# Patient Record
Sex: FEMALE | Race: WHITE | Employment: OTHER | URBAN - METROPOLITAN AREA
[De-identification: names, ages, dates, MRNs, and addresses within clinical notes are randomized per-mention and may not be internally consistent; named-entity substitution may affect disease eponyms.]

---

## 2021-10-03 ENCOUNTER — APPOINTMENT (OUTPATIENT)
Dept: CT IMAGING | Age: 81
DRG: 064 | End: 2021-10-03
Attending: FAMILY MEDICINE
Payer: MEDICARE

## 2021-10-03 ENCOUNTER — APPOINTMENT (OUTPATIENT)
Dept: MRI IMAGING | Age: 81
DRG: 064 | End: 2021-10-03
Attending: NEUROLOGICAL SURGERY
Payer: MEDICARE

## 2021-10-03 ENCOUNTER — HOSPITAL ENCOUNTER (INPATIENT)
Age: 81
LOS: 19 days | Discharge: NURSING, REHAB OR LONG-TERM CARE | DRG: 064 | End: 2021-10-22
Attending: EMERGENCY MEDICINE | Admitting: HOSPITALIST
Payer: MEDICARE

## 2021-10-03 ENCOUNTER — APPOINTMENT (OUTPATIENT)
Dept: CT IMAGING | Age: 81
DRG: 064 | End: 2021-10-03
Attending: EMERGENCY MEDICINE
Payer: MEDICARE

## 2021-10-03 DIAGNOSIS — R41.0 CONFUSION: ICD-10-CM

## 2021-10-03 DIAGNOSIS — S06.310A INTRAPARENCHYMAL HEMATOMA OF BRAIN, RIGHT, WITHOUT LOSS OF CONSCIOUSNESS, INITIAL ENCOUNTER (HCC): Primary | ICD-10-CM

## 2021-10-03 DIAGNOSIS — I62.9 INTRACRANIAL BLEED (HCC): ICD-10-CM

## 2021-10-03 DIAGNOSIS — R51.9 ACUTE NONINTRACTABLE HEADACHE, UNSPECIFIED HEADACHE TYPE: ICD-10-CM

## 2021-10-03 PROBLEM — I10 POORLY-CONTROLLED HYPERTENSION: Status: ACTIVE | Noted: 2021-10-03

## 2021-10-03 PROBLEM — G93.40 ENCEPHALOPATHY, UNSPECIFIED: Status: ACTIVE | Noted: 2021-10-03

## 2021-10-03 PROBLEM — F03.90 DEMENTIA (HCC): Status: ACTIVE | Noted: 2021-10-03

## 2021-10-03 LAB
ALBUMIN SERPL-MCNC: 3.6 G/DL (ref 3.2–4.6)
ALBUMIN/GLOB SERPL: 1 {RATIO} (ref 1.2–3.5)
ALP SERPL-CCNC: 79 U/L (ref 50–136)
ALT SERPL-CCNC: 16 U/L (ref 12–65)
ANION GAP SERPL CALC-SCNC: 4 MMOL/L (ref 7–16)
APTT PPP: 28.9 SEC (ref 24.1–35.1)
AST SERPL-CCNC: 11 U/L (ref 15–37)
BASOPHILS # BLD: 0 K/UL (ref 0–0.2)
BASOPHILS NFR BLD: 1 % (ref 0–2)
BILIRUB SERPL-MCNC: 0.4 MG/DL (ref 0.2–1.1)
BUN SERPL-MCNC: 27 MG/DL (ref 8–23)
CALCIUM SERPL-MCNC: 9 MG/DL (ref 8.3–10.4)
CHLORIDE SERPL-SCNC: 109 MMOL/L (ref 98–107)
CO2 SERPL-SCNC: 28 MMOL/L (ref 21–32)
CREAT SERPL-MCNC: 1.81 MG/DL (ref 0.6–1)
CRP SERPL-MCNC: <0.3 MG/DL (ref 0–0.9)
DIFFERENTIAL METHOD BLD: ABNORMAL
EOSINOPHIL # BLD: 0.1 K/UL (ref 0–0.8)
EOSINOPHIL NFR BLD: 2 % (ref 0.5–7.8)
ERYTHROCYTE [DISTWIDTH] IN BLOOD BY AUTOMATED COUNT: 12 % (ref 11.9–14.6)
ERYTHROCYTE [SEDIMENTATION RATE] IN BLOOD: 15 MM/HR (ref 0–30)
GLOBULIN SER CALC-MCNC: 3.6 G/DL (ref 2.3–3.5)
GLUCOSE BLD STRIP.AUTO-MCNC: 110 MG/DL (ref 65–100)
GLUCOSE SERPL-MCNC: 101 MG/DL (ref 65–100)
HCT VFR BLD AUTO: 36.8 % (ref 35.8–46.3)
HGB BLD-MCNC: 12.2 G/DL (ref 11.7–15.4)
IMM GRANULOCYTES # BLD AUTO: 0 K/UL (ref 0–0.5)
IMM GRANULOCYTES NFR BLD AUTO: 0 % (ref 0–5)
INR PPP: 1
LYMPHOCYTES # BLD: 1.1 K/UL (ref 0.5–4.6)
LYMPHOCYTES NFR BLD: 19 % (ref 13–44)
MCH RBC QN AUTO: 31.9 PG (ref 26.1–32.9)
MCHC RBC AUTO-ENTMCNC: 33.2 G/DL (ref 31.4–35)
MCV RBC AUTO: 96.1 FL (ref 79.6–97.8)
MONOCYTES # BLD: 0.7 K/UL (ref 0.1–1.3)
MONOCYTES NFR BLD: 12 % (ref 4–12)
NEUTS SEG # BLD: 4 K/UL (ref 1.7–8.2)
NEUTS SEG NFR BLD: 67 % (ref 43–78)
NRBC # BLD: 0 K/UL (ref 0–0.2)
PLATELET # BLD AUTO: 231 K/UL (ref 150–450)
PMV BLD AUTO: 11.1 FL (ref 9.4–12.3)
POTASSIUM SERPL-SCNC: 4.1 MMOL/L (ref 3.5–5.1)
PROT SERPL-MCNC: 7.2 G/DL (ref 6.3–8.2)
PROTHROMBIN TIME: 13.3 SEC (ref 12.6–14.5)
RBC # BLD AUTO: 3.83 M/UL (ref 4.05–5.2)
SERVICE CMNT-IMP: ABNORMAL
SODIUM SERPL-SCNC: 141 MMOL/L (ref 136–145)
WBC # BLD AUTO: 6.1 K/UL (ref 4.3–11.1)

## 2021-10-03 PROCEDURE — 70498 CT ANGIOGRAPHY NECK: CPT

## 2021-10-03 PROCEDURE — 96372 THER/PROPH/DIAG INJ SC/IM: CPT

## 2021-10-03 PROCEDURE — 51702 INSERT TEMP BLADDER CATH: CPT

## 2021-10-03 PROCEDURE — 4A03X5D MEASUREMENT OF ARTERIAL FLOW, INTRACRANIAL, EXTERNAL APPROACH: ICD-10-PCS | Performed by: RADIOLOGY

## 2021-10-03 PROCEDURE — 96375 TX/PRO/DX INJ NEW DRUG ADDON: CPT

## 2021-10-03 PROCEDURE — 85610 PROTHROMBIN TIME: CPT

## 2021-10-03 PROCEDURE — 65610000006 HC RM INTENSIVE CARE

## 2021-10-03 PROCEDURE — 85025 COMPLETE CBC W/AUTO DIFF WBC: CPT

## 2021-10-03 PROCEDURE — 86140 C-REACTIVE PROTEIN: CPT

## 2021-10-03 PROCEDURE — 74011000636 HC RX REV CODE- 636: Performed by: EMERGENCY MEDICINE

## 2021-10-03 PROCEDURE — 74011000250 HC RX REV CODE- 250: Performed by: FAMILY MEDICINE

## 2021-10-03 PROCEDURE — 80053 COMPREHEN METABOLIC PANEL: CPT

## 2021-10-03 PROCEDURE — 74011250636 HC RX REV CODE- 250/636: Performed by: FAMILY MEDICINE

## 2021-10-03 PROCEDURE — 74011250636 HC RX REV CODE- 250/636: Performed by: HOSPITALIST

## 2021-10-03 PROCEDURE — 82962 GLUCOSE BLOOD TEST: CPT

## 2021-10-03 PROCEDURE — A9576 INJ PROHANCE MULTIPACK: HCPCS | Performed by: HOSPITALIST

## 2021-10-03 PROCEDURE — 99284 EMERGENCY DEPT VISIT MOD MDM: CPT

## 2021-10-03 PROCEDURE — 70450 CT HEAD/BRAIN W/O DYE: CPT

## 2021-10-03 PROCEDURE — 74011250636 HC RX REV CODE- 250/636: Performed by: INTERNAL MEDICINE

## 2021-10-03 PROCEDURE — 74011000258 HC RX REV CODE- 258: Performed by: EMERGENCY MEDICINE

## 2021-10-03 PROCEDURE — 85652 RBC SED RATE AUTOMATED: CPT

## 2021-10-03 PROCEDURE — 0042T CT PERF W CBF: CPT

## 2021-10-03 PROCEDURE — 74011000250 HC RX REV CODE- 250: Performed by: EMERGENCY MEDICINE

## 2021-10-03 PROCEDURE — 74011000258 HC RX REV CODE- 258: Performed by: HOSPITALIST

## 2021-10-03 PROCEDURE — 70553 MRI BRAIN STEM W/O & W/DYE: CPT

## 2021-10-03 PROCEDURE — 96374 THER/PROPH/DIAG INJ IV PUSH: CPT

## 2021-10-03 PROCEDURE — 85730 THROMBOPLASTIN TIME PARTIAL: CPT

## 2021-10-03 PROCEDURE — 74011250636 HC RX REV CODE- 250/636: Performed by: EMERGENCY MEDICINE

## 2021-10-03 RX ORDER — MANNITOL 20 G/100ML
25 INJECTION, SOLUTION INTRAVENOUS ONCE
Status: COMPLETED | OUTPATIENT
Start: 2021-10-03 | End: 2021-10-03

## 2021-10-03 RX ORDER — PROMETHAZINE HYDROCHLORIDE 25 MG/ML
25 INJECTION, SOLUTION INTRAMUSCULAR; INTRAVENOUS
Status: COMPLETED | OUTPATIENT
Start: 2021-10-03 | End: 2021-10-03

## 2021-10-03 RX ORDER — METOCLOPRAMIDE HYDROCHLORIDE 5 MG/ML
5 INJECTION INTRAMUSCULAR; INTRAVENOUS
Status: DISCONTINUED | OUTPATIENT
Start: 2021-10-03 | End: 2021-10-07

## 2021-10-03 RX ORDER — LORAZEPAM 2 MG/ML
INJECTION INTRAMUSCULAR
Status: ACTIVE
Start: 2021-10-03 | End: 2021-10-03

## 2021-10-03 RX ORDER — PROCHLORPERAZINE EDISYLATE 5 MG/ML
10 INJECTION INTRAMUSCULAR; INTRAVENOUS
Status: DISCONTINUED | OUTPATIENT
Start: 2021-10-03 | End: 2021-10-03

## 2021-10-03 RX ORDER — HYDRALAZINE HYDROCHLORIDE 20 MG/ML
15 INJECTION INTRAMUSCULAR; INTRAVENOUS
Status: DISCONTINUED | OUTPATIENT
Start: 2021-10-03 | End: 2021-10-09

## 2021-10-03 RX ORDER — LORAZEPAM 2 MG/ML
1 INJECTION INTRAMUSCULAR
Status: COMPLETED | OUTPATIENT
Start: 2021-10-03 | End: 2021-10-03

## 2021-10-03 RX ORDER — LABETALOL HYDROCHLORIDE 5 MG/ML
10 INJECTION, SOLUTION INTRAVENOUS
Status: COMPLETED | OUTPATIENT
Start: 2021-10-03 | End: 2021-10-03

## 2021-10-03 RX ORDER — SODIUM CHLORIDE 0.9 % (FLUSH) 0.9 %
5-40 SYRINGE (ML) INJECTION AS NEEDED
Status: DISCONTINUED | OUTPATIENT
Start: 2021-10-03 | End: 2021-10-22 | Stop reason: HOSPADM

## 2021-10-03 RX ORDER — MANNITOL 250 MG/ML
25 INJECTION, SOLUTION INTRAVENOUS ONCE
Status: DISCONTINUED | OUTPATIENT
Start: 2021-10-03 | End: 2021-10-03 | Stop reason: SDUPTHER

## 2021-10-03 RX ORDER — SODIUM CHLORIDE 0.9 % (FLUSH) 0.9 %
10 SYRINGE (ML) INJECTION
Status: COMPLETED | OUTPATIENT
Start: 2021-10-03 | End: 2021-10-03

## 2021-10-03 RX ORDER — ONDANSETRON 2 MG/ML
8 INJECTION INTRAMUSCULAR; INTRAVENOUS
Status: DISCONTINUED | OUTPATIENT
Start: 2021-10-03 | End: 2021-10-17

## 2021-10-03 RX ORDER — ONDANSETRON 2 MG/ML
4 INJECTION INTRAMUSCULAR; INTRAVENOUS
Status: COMPLETED | OUTPATIENT
Start: 2021-10-03 | End: 2021-10-03

## 2021-10-03 RX ORDER — SODIUM CHLORIDE 0.9 % (FLUSH) 0.9 %
5-40 SYRINGE (ML) INJECTION EVERY 8 HOURS
Status: DISCONTINUED | OUTPATIENT
Start: 2021-10-03 | End: 2021-10-10

## 2021-10-03 RX ORDER — LEVETIRACETAM 500 MG/1
500 TABLET ORAL
Status: DISCONTINUED | OUTPATIENT
Start: 2021-10-03 | End: 2021-10-03

## 2021-10-03 RX ORDER — MORPHINE SULFATE 2 MG/ML
2 INJECTION, SOLUTION INTRAMUSCULAR; INTRAVENOUS
Status: COMPLETED | OUTPATIENT
Start: 2021-10-03 | End: 2021-10-03

## 2021-10-03 RX ORDER — LEVETIRACETAM 500 MG/1
500 TABLET ORAL 2 TIMES DAILY
Status: DISCONTINUED | OUTPATIENT
Start: 2021-10-03 | End: 2021-10-03 | Stop reason: SDUPTHER

## 2021-10-03 RX ORDER — AMLODIPINE BESYLATE 5 MG/1
5 TABLET ORAL DAILY
Status: DISCONTINUED | OUTPATIENT
Start: 2021-10-03 | End: 2021-10-07

## 2021-10-03 RX ORDER — SODIUM CHLORIDE 9 MG/ML
50 INJECTION, SOLUTION INTRAVENOUS CONTINUOUS
Status: DISCONTINUED | OUTPATIENT
Start: 2021-10-03 | End: 2021-10-05

## 2021-10-03 RX ORDER — KETOROLAC TROMETHAMINE 15 MG/ML
15 INJECTION, SOLUTION INTRAMUSCULAR; INTRAVENOUS
Status: DISCONTINUED | OUTPATIENT
Start: 2021-10-03 | End: 2021-10-03

## 2021-10-03 RX ORDER — ONDANSETRON 2 MG/ML
INJECTION INTRAMUSCULAR; INTRAVENOUS
Status: COMPLETED
Start: 2021-10-03 | End: 2021-10-04

## 2021-10-03 RX ORDER — LABETALOL HYDROCHLORIDE 5 MG/ML
10 INJECTION, SOLUTION INTRAVENOUS
Status: DISCONTINUED | OUTPATIENT
Start: 2021-10-03 | End: 2021-10-09

## 2021-10-03 RX ADMIN — IOPAMIDOL 100 ML: 755 INJECTION, SOLUTION INTRAVENOUS at 04:00

## 2021-10-03 RX ADMIN — Medication 10 ML: at 21:05

## 2021-10-03 RX ADMIN — MANNITOL 25 G: 20 INJECTION, SOLUTION INTRAVENOUS at 22:00

## 2021-10-03 RX ADMIN — GADOTERIDOL 14 ML: 279.3 INJECTION, SOLUTION INTRAVENOUS at 10:02

## 2021-10-03 RX ADMIN — SODIUM CHLORIDE 5 MG/HR: 900 INJECTION, SOLUTION INTRAVENOUS at 22:43

## 2021-10-03 RX ADMIN — Medication 5 ML: at 06:00

## 2021-10-03 RX ADMIN — LEVETIRACETAM 500 MG: 100 INJECTION, SOLUTION INTRAVENOUS at 21:05

## 2021-10-03 RX ADMIN — LABETALOL HYDROCHLORIDE 10 MG: 5 INJECTION INTRAVENOUS at 03:40

## 2021-10-03 RX ADMIN — LORAZEPAM 1 MG: 2 INJECTION INTRAMUSCULAR; INTRAVENOUS at 05:34

## 2021-10-03 RX ADMIN — ONDANSETRON 4 MG: 2 INJECTION INTRAMUSCULAR; INTRAVENOUS at 03:09

## 2021-10-03 RX ADMIN — SODIUM CHLORIDE 500 ML: 900 INJECTION, SOLUTION INTRAVENOUS at 03:19

## 2021-10-03 RX ADMIN — HYDRALAZINE HYDROCHLORIDE 15 MG: 20 INJECTION INTRAMUSCULAR; INTRAVENOUS at 21:06

## 2021-10-03 RX ADMIN — MORPHINE SULFATE 2 MG: 2 INJECTION, SOLUTION INTRAMUSCULAR; INTRAVENOUS at 03:10

## 2021-10-03 RX ADMIN — METOCLOPRAMIDE HYDROCHLORIDE 5 MG: 5 INJECTION INTRAMUSCULAR; INTRAVENOUS at 21:45

## 2021-10-03 RX ADMIN — LEVETIRACETAM 500 MG: 100 INJECTION, SOLUTION INTRAVENOUS at 05:59

## 2021-10-03 RX ADMIN — ONDANSETRON 8 MG: 2 INJECTION INTRAMUSCULAR; INTRAVENOUS at 22:41

## 2021-10-03 RX ADMIN — Medication 10 ML: at 04:00

## 2021-10-03 RX ADMIN — Medication 10 ML: at 10:02

## 2021-10-03 RX ADMIN — SODIUM CHLORIDE 50 ML/HR: 900 INJECTION, SOLUTION INTRAVENOUS at 20:14

## 2021-10-03 RX ADMIN — LORAZEPAM 1 MG: 2 INJECTION INTRAMUSCULAR; INTRAVENOUS at 05:21

## 2021-10-03 RX ADMIN — SODIUM CHLORIDE 100 ML: 900 INJECTION, SOLUTION INTRAVENOUS at 04:00

## 2021-10-03 RX ADMIN — PROMETHAZINE HYDROCHLORIDE 25 MG: 25 INJECTION INTRAMUSCULAR; INTRAVENOUS at 03:42

## 2021-10-03 NOTE — PROGRESS NOTES
NSR Contact Note    Called by [facility] ED regarding pt with worsening HA and possible ongoing confusion per family. Pt reportedly with nonfocal and intact exam per ED at this time. Hx of hypertension, and per chart review, recent \"eye surgery\" 3wks ago. No history of trauma. Imaging reviewed - CTH w/o contrast shows age appropriate cortical atrophy with R middle fossa IPH with hematoma extending to middle fossa floor with potential some SDH. Notable hypodensity within low temporal lobe associated with 'target sign' seen on coronal imaging. Atypical appearance for hypertensive ICH, though location may be more consistent with venous infarct and resultant hemorrhage given lack of traumatic history vs potentially metastatic disease. Will recommend CTA with CTV to check for sinus thrombosis. Will also obtain MRI c/s contrast to rule out other underlying enhancing lesion or CVA. Hold any antiplt/anticoagulant medications, and NSR to follow-up for further imaging. Recommend medicine admission for further workup and will likely need repeat [facility] tomorrow to document stability of hemorrhage. Given location o fICH, recommend starting AED with keppra BID.

## 2021-10-03 NOTE — PROGRESS NOTES
Patient admitted after midnight  Presented with severe headache and found to have right sided temporal parenchymal hemorrhage with vasogenic edema and SDH  Neurosurgery consulted - not recommending surgery at this time  Has recommended MRI - no evidence of neoplasm or aneurysm    Patient somnolent after ativan for agitation. Opens eyes to voice and follows commands, minimally conversant  cvs rrr s1 s2 no mrg  Lungs cta b/l  abd soft/ nt/nd  Neuro - - motor 5/5 b/l upper and lower ext and sensation intact and equal  Ext No edema    Admit to icu, keep HOB elevated 30degree angle, continue serial neurochecks, NPO for now until cleared by speech, CT head in AM. Avoid anticoagulation. Cont AED as per neuro. PRN labetalol for SBP <140. Repeat CT non contrast in the AM.  Pt's daughter at bedside - confirmed full code. Questions answered.

## 2021-10-03 NOTE — CONSULTS
NEUROSURGERY CONSULT NOTE    Consult Date: 10/3/2021    IP CONSULT TO NEUROSURGERY  Consult performed by: Alfred Savage MD  Consult ordered by: Martha Bonilla MD  Reason for consult: New right-sided temporal intraparenchymal hemorrhage  Assessment/Recommendations:     Imaging:  Noncontrast CT of the head was performed and available for evaluation and showed new right-sided temporal hematoma within the parenchyma as well as with what appears to be an extension within the subdural space within the middle fossa. Patient also has surrounding vasogenic edema with central hyper density, consistent with target sign. Patient has no significant mass-effect, midline shift, intraventricular hemorrhage or subarachnoid hemorrhage noted. 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 Patient has no vascular pathology associated with the hemorrhage and this overall appears stable. Though, full venous phase was not performed, there does not appear to be gross abnormality associated with venous sinus thrombosis, though this is limited due to imaging modality and timing of contrast.    Assessment and plan:  Patient is an 78-year-old female who presents with new onset right sided temporal parenchymal hemorrhage with associated vasogenic edema and subdural hematoma. 1.  Overall, the patient does not have need for emergent neurosurgical intervention. There is no associated vascular abnormality or apparent venous sinus thrombosis associated with this upon current imaging. We will continue to recommend an MRI of the brain with and without contrast to rule out areas of ischemic stroke consistent with a venous stroke versus underlying enhancing lesion.   I have discussed this with the patient as well as her family at bedside today and will recommend continuation of conservative management at this time. Would recommend a repeat noncontrast CT of the head tomorrow morning to document stability of the hemorrhage as well as further work-up of potential etiology with MRI, and further imaging of the CT chest abdomen and pelvis if contrast enhancement is identified underlying hematoma. 2.  Recommend every hour neuro checks with head of bed greater than 30 degrees, normotension and avoidance of antiplatelet and anticoagulant medications. Neurosurgery will continue to follow along and will await further follow-up imaging. No emergent neurosurgical intervention is warranted at this time, recommend continuation of supportive care. 3.  Will recommend continuation of antiepileptics due to location of hemorrhage. 4.  The patient's right-sided proximal ICA aneurysm is remote from the patient's hemorrhage and with absence of subarachnoid hemorrhage, I do not feel this is currently related. Would recommend appropriate follow-up as indicated, but would recommend further management and work-up of her current hemorrhage at this time. Subjective   Patient reportedly had severe onset of headache yesterday morning which did not improve throughout the day with multiple trials of Tylenol. Patient had no episodes of visual disturbance, numbness, weakness, tingling paresthesias, other focal neurologic deficits. Patient's family does state that she had some confusion as well as agitation towards the end of the evening as well as upon presentation to the emergency department. Noncontrast CT of the head was performed and neurosurgery was consulted due to right temporal intraparenchymal hemorrhage. Past Medical History:   Diagnosis Date    Endocrine disease     hypothyroidism    Hypertension     Other ill-defined conditions     chronic back pain. No past surgical history on file. No family history on file.    Social History     Tobacco Use    Smoking status: Never Smoker   Substance Use Topics    Alcohol use: No       Current Facility-Administered Medications   Medication Dose Route Frequency Provider Last Rate Last Admin    sodium chloride (NS) flush 5-40 mL  5-40 mL IntraVENous Q8H Morris Yuan MD   5 mL at 10/03/21 0600    sodium chloride (NS) flush 5-40 mL  5-40 mL IntraVENous PRN Morris Yuan MD        levETIRAcetam (KEPPRA) 500 mg in 0.9% sodium chloride (MBP/ADV) 100 mL MBP  500 mg IntraVENous Q12H Morris Yuan MD        labetaloL (NORMODYNE;TRANDATE) injection 10 mg  10 mg IntraVENous Q4H PRN Morris Yuan MD        hydrALAZINE (APRESOLINE) 20 mg/mL injection 15 mg  15 mg IntraVENous Q6H PRN Morris Yuan MD         Current Outpatient Medications   Medication Sig Dispense Refill    calcium-vitamin D (OYSTER SHELL) 500 mg(1,250mg) -200 unit per tablet Take 1 Tab by mouth three (3) times daily (with meals). 90 Tab 0    traMADol (ULTRAM) 50 mg tablet Take 1 Tab by mouth every six (6) hours as needed for Pain. 60 Tab 0    amLODIPine (NORVASC) 5 mg tablet Take 5 mg by mouth daily.  levothyroxine (SYNTHROID) 112 mcg tablet Take 112 mcg by mouth Daily (before breakfast). Review of Systems   10 point review of systems was unable to be formed due to patient's drowsy and recently sedated status. Per patient's family member, patient is positive for headaches, denies fevers, chills, malaise, shortness of breath, chest pain, upper or lower extremity numbness, weakness, pain, paralysis. Objective     Vital signs for last 24 hours:  Visit Vitals  BP (!) 159/86   Pulse 73   Temp 98.1 °F (36.7 °C)   Resp 24   Ht 5' 5\" (1.651 m)   Wt 74.4 kg (164 lb)   SpO2 92%   BMI 27.29 kg/m²       Intake/Output this shift:  Current Shift: No intake/output data recorded.   Last 3 Shifts: 10/01 1901 - 10/03 0700  In: 500 [I.V.:500]  Out: -     Data Review:   Recent Results (from the past 24 hour(s))   CBC WITH AUTOMATED DIFF    Collection Time: 10/03/21  3:05 AM   Result Value Ref Range WBC 6.1 4.3 - 11.1 K/uL    RBC 3.83 (L) 4.05 - 5.2 M/uL    HGB 12.2 11.7 - 15.4 g/dL    HCT 36.8 35.8 - 46.3 %    MCV 96.1 79.6 - 97.8 FL    MCH 31.9 26.1 - 32.9 PG    MCHC 33.2 31.4 - 35.0 g/dL    RDW 12.0 11.9 - 14.6 %    PLATELET 036 269 - 697 K/uL    MPV 11.1 9.4 - 12.3 FL    ABSOLUTE NRBC 0.00 0.0 - 0.2 K/uL    DF AUTOMATED      NEUTROPHILS 67 43 - 78 %    LYMPHOCYTES 19 13 - 44 %    MONOCYTES 12 4.0 - 12.0 %    EOSINOPHILS 2 0.5 - 7.8 %    BASOPHILS 1 0.0 - 2.0 %    IMMATURE GRANULOCYTES 0 0.0 - 5.0 %    ABS. NEUTROPHILS 4.0 1.7 - 8.2 K/UL    ABS. LYMPHOCYTES 1.1 0.5 - 4.6 K/UL    ABS. MONOCYTES 0.7 0.1 - 1.3 K/UL    ABS. EOSINOPHILS 0.1 0.0 - 0.8 K/UL    ABS. BASOPHILS 0.0 0.0 - 0.2 K/UL    ABS. IMM. GRANS. 0.0 0.0 - 0.5 K/UL   METABOLIC PANEL, COMPREHENSIVE    Collection Time: 10/03/21  3:05 AM   Result Value Ref Range    Sodium 141 136 - 145 mmol/L    Potassium 4.1 3.5 - 5.1 mmol/L    Chloride 109 (H) 98 - 107 mmol/L    CO2 28 21 - 32 mmol/L    Anion gap 4 (L) 7 - 16 mmol/L    Glucose 101 (H) 65 - 100 mg/dL    BUN 27 (H) 8 - 23 MG/DL    Creatinine 1.81 (H) 0.6 - 1.0 MG/DL    GFR est AA 34 (L) >60 ml/min/1.73m2    GFR est non-AA 28 (L) >60 ml/min/1.73m2    Calcium 9.0 8.3 - 10.4 MG/DL    Bilirubin, total 0.4 0.2 - 1.1 MG/DL    ALT (SGPT) 16 12 - 65 U/L    AST (SGOT) 11 (L) 15 - 37 U/L    Alk.  phosphatase 79 50 - 136 U/L    Protein, total 7.2 6.3 - 8.2 g/dL    Albumin 3.6 3.2 - 4.6 g/dL    Globulin 3.6 (H) 2.3 - 3.5 g/dL    A-G Ratio 1.0 (L) 1.2 - 3.5     C REACTIVE PROTEIN, QT    Collection Time: 10/03/21  3:05 AM   Result Value Ref Range    C-Reactive protein <0.3 0.0 - 0.9 mg/dL   SED RATE, AUTOMATED    Collection Time: 10/03/21  3:05 AM   Result Value Ref Range    Sed rate, automated 15 0 - 30 mm/hr   PROTHROMBIN TIME + INR    Collection Time: 10/03/21  3:28 AM   Result Value Ref Range    Prothrombin time 13.3 12.6 - 14.5 sec    INR 1.0     PTT    Collection Time: 10/03/21  3:28 AM   Result Value Ref Range    aPTT 28.9 24.1 - 35.1 SEC       Physical Exam  Patient is asleep but awakens with voice and gentle stimulation. Patient opens her eyes to voice but closes them spontaneously, status post Ativan sedation. Patient has extraocular movements intact, cranial nerves intact and symmetric. Patient's pupils are reactive and equal.  Patient is oriented to person, place, year. Patient moves all extremities appropriately and is 5 out of 5 bilateral upper and bilateral lower extremity strength with exception of bilateral dorsiflexion which patient has difficulty with following specific commands due to drowsiness. Sensation is intact to light touch/light stimulation. Patient is soft, nontender abdomen, easy work of breathing without respiratory distress or stridor noted on exam.  Gait was deferred. Patient is status post recent Ativan sedation per family members report.

## 2021-10-03 NOTE — H&P
Hospitalist History and Physical   Admit Date:  10/3/2021  2:16 AM   Name:  Stalin Lentz   Age:  78 y.o. Sex:  female  :  1940   MRN:  [id number]     Presenting Complaint: Headache    Reason(s) for Admission: Intracranial bleed ([address] [I62.9]  Dementia ([address] [F03.90]  Poorly-controlled hypertension [I10]  Hypothyroidism [E03.9]  Encephalopathy, unspecified [G93.40]     History of Present Illness:   Stalin Lentz is a 78 y.o. female with medical history of hypertension, dementia, hypothyroidism presented to emergency room with headache, worsening of confusion. Patient was complaining headache to family about right side which continued to get worse over the right temporal area. As per family patient is getting more agitated, confused but no history of slurring in speech, patient was brought to emergency room. Patient was evaluated, CT head shows evidence of right temporal parenchymal hemorrhage around 2 cm. 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 Review of Systems:  10 systems reviewed and negative except as noted in HPI. Assessment & Plan:   Principal Problem:    Intracranial bleed ([address] (10/3/2021)  Neurosurgery recommended for MRI, will obtain MRI ICU for close monitoring. Initiated on Keppra prophylactically to avoid any further seizures. Active Problems:    Poorly-controlled hypertension (10/3/2021)  We will provide as needed IV (to control the blood pressure. Encephalopathy, unspecified (10/3/2021)  Likely secondary to intracranial bleed. Dementia ([address] (10/3/2021)  Supportive therapy      Hypothyroidism (2012)  Continue on levothyroxine. Disposition/Discharge Planning: Home with family.     Diet: ADULT DIET Regular  VTE ppx: Contraindicated secondary to intracranial bleed. Code status: Full Code      Past Medical History:   Diagnosis Date    Endocrine disease     hypothyroidism    Hypertension     Other ill-defined conditions     chronic back pain. Surgical history: History of hip replacement. No past surgical history on file. No Known Allergies   Social History     Tobacco Use    Smoking status: Never Smoker   Substance Use Topics    Alcohol use: No       Family history: No intracranial hemorrhage or aneurysms in the family. History of hypertension runs in the family. Immunization History   Administered Date(s) Administered    COVID-19, PFIZER, MRNA, LNP-S, PF, 30MCG/0.3ML DOSE 02/24/2021, 03/17/2021     PTA Medications:  Current Outpatient Medications   Medication Instructions    amLODIPine (NORVASC) 5 mg, DAILY    calcium-vitamin D (OYSTER SHELL) 500 mg(1,250mg) -200 unit per tablet 1 Tablet, Oral, 3 TIMES DAILY WITH MEALS    levothyroxine (SYNTHROID) 112 mcg, DAILY BEFORE BREAKFAST    traMADoL (ULTRAM) 50 mg, Oral, EVERY 6 HOURS AS NEEDED       Objective:     Patient Vitals for the past 24 hrs:   Temp Pulse Resp BP SpO2   10/03/21 0524  73  (!) 159/86 92 %   10/03/21 0331  67 24 (!) 149/66 95 %   10/03/21 0154 98.1 °F (36.7 °C) 73 18 (!) 152/78 95 %     Oxygen Therapy  O2 Sat (%): 92 % (10/03/21 0524)  Pulse via Oximetry: 71 beats per minute (10/03/21 0524)    Estimated body mass index is 27.29 kg/m² as calculated from the following:    Height as of this encounter: 5' 5\" (1.651 m). Weight as of this encounter: 74.4 kg (164 lb). Intake/Output Summary (Last 24 hours) at 10/3/2021 0600  Last data filed at 10/3/2021 0541  Gross per 24 hour   Intake 500 ml   Output    Net 500 ml         Physical Exam:    General:   Looks very confused, agitated slightly. Head:  Normocephalic, atraumatic  Eyes:  Sclerae appear normal.  Pupils equally round. HENT:  Nares appear normal, no drainage.   Moist mucous membranes  Neck:  No restricted ROM. Trachea midline  CV:   RRR. No m/r/g. No JVD  Lungs:   CTAB. No wheezing, rhonchi, or rales. Appears even, unlabored  Abdomen: Bowel sounds present. Soft, nontender, nondistended. Extremities: Warm and dry. No cyanosis or clubbing. No edema. Skin:     No rashes. Normal turgor. Normal coloration  Neuro: Alert but not oriented. Data Ordered and Personally Reviewed:    Last 24hr Labs:  Recent Results (from the past 24 hour(s))   CBC WITH AUTOMATED DIFF    Collection Time: 10/03/21  3:05 AM   Result Value Ref Range    WBC 6.1 4.3 - 11.1 K/uL    RBC 3.83 (L) 4.05 - 5.2 M/uL    HGB 12.2 11.7 - 15.4 g/dL    HCT 36.8 35.8 - 46.3 %    MCV 96.1 79.6 - 97.8 FL    MCH 31.9 26.1 - 32.9 PG    MCHC 33.2 31.4 - 35.0 g/dL    RDW 12.0 11.9 - 14.6 %    PLATELET 854 090 - 218 K/uL    MPV 11.1 9.4 - 12.3 FL    ABSOLUTE NRBC 0.00 0.0 - 0.2 K/uL    DF AUTOMATED      NEUTROPHILS 67 43 - 78 %    LYMPHOCYTES 19 13 - 44 %    MONOCYTES 12 4.0 - 12.0 %    EOSINOPHILS 2 0.5 - 7.8 %    BASOPHILS 1 0.0 - 2.0 %    IMMATURE GRANULOCYTES 0 0.0 - 5.0 %    ABS. NEUTROPHILS 4.0 1.7 - 8.2 K/UL    ABS. LYMPHOCYTES 1.1 0.5 - 4.6 K/UL    ABS. MONOCYTES 0.7 0.1 - 1.3 K/UL    ABS. EOSINOPHILS 0.1 0.0 - 0.8 K/UL    ABS. BASOPHILS 0.0 0.0 - 0.2 K/UL    ABS. IMM. GRANS. 0.0 0.0 - 0.5 K/UL   METABOLIC PANEL, COMPREHENSIVE    Collection Time: 10/03/21  3:05 AM   Result Value Ref Range    Sodium 141 136 - 145 mmol/L    Potassium 4.1 3.5 - 5.1 mmol/L    Chloride 109 (H) 98 - 107 mmol/L    CO2 28 21 - 32 mmol/L    Anion gap 4 (L) 7 - 16 mmol/L    Glucose 101 (H) 65 - 100 mg/dL    BUN 27 (H) 8 - 23 MG/DL    Creatinine 1.81 (H) 0.6 - 1.0 MG/DL    GFR est AA 34 (L) >60 ml/min/1.73m2    GFR est non-AA 28 (L) >60 ml/min/1.73m2    Calcium 9.0 8.3 - 10.4 MG/DL    Bilirubin, total 0.4 0.2 - 1.1 MG/DL    ALT (SGPT) 16 12 - 65 U/L    AST (SGOT) 11 (L) 15 - 37 U/L    Alk.  phosphatase 79 50 - 136 U/L    Protein, total 7.2 6.3 - 8.2 g/dL    Albumin 3.6 3.2 - 4.6 g/dL    Globulin 3.6 (H) 2.3 - 3.5 g/dL    A-G Ratio 1.0 (L) 1.2 - 3.5     C REACTIVE PROTEIN, QT    Collection Time: 10/03/21  3:05 AM   Result Value Ref Range    C-Reactive protein <0.3 0.0 - 0.9 mg/dL   SED RATE, AUTOMATED    Collection Time: 10/03/21  3:05 AM   Result Value Ref Range    Sed rate, automated 15 0 - 30 mm/hr   PROTHROMBIN TIME + INR    Collection Time: 10/03/21  3:28 AM   Result Value Ref Range    Prothrombin time 13.3 12.6 - 14.5 sec    INR 1.0     PTT    Collection Time: 10/03/21  3:28 AM   Result Value Ref Range    aPTT 28.9 24.1 - 35.1 SEC       All Micro Results     None          Other Studies:  CT HEAD WO CONT    Result Date: 10/3/2021  Noncontrast CT of the brain. COMPARISON: none INDICATION: Headache TECHNIQUE: Contiguous axial images were obtained from the skull base through the vertex without IV contrast. Radiation dose reduction techniques were used for this study:  Our CT scanners use one or all of the following: Automated exposure control, adjustment of the mA and/or kVp according to patient's size, iterative reconstruction. FINDINGS: There is acute intraparenchymal hematoma in the right temporal lobe, measuring up to 2.0 x 2.3 x 1.7 cm. Associated surrounding edema. There is no midline shift or hydrocephalus. Cerebellum and brainstem are grossly unremarkable. Periventricular hypodensities, nonspecific and likely due to chronic small vessel changes. Included globes appear intact. Paranasal sinuses and the mastoid air cells are aerated. There is no skull fracture. 1. An approximately 2.3 cm intraparenchymal hematoma in the right temporal lobe, of uncertain etiology but may be related to hypertension. Consider obtaining CT angiogram of the head to exclude possibility of underlying aneurysm. Other etiologies such as underlying mass and amyloid angiopathy could be considered. 2. No midline shift or hydrocephalus.  DC5 Findings discussed with ED physician, Dr. Mukesh Medrano, at 3:05 AM on exam date. CT PERF W CBF    Result Date: 10/3/2021  CT Perfusion Imaging INDICATION:  Right temporal lobe hematoma. Headaches. Right-sided pain. CT perfusion imaging of the brain was performed after the administration of intravenous contrast.  Perfusion maps and perfusion analysis output were generated using the vis ai perfusion processing software algorithm. Radiation dose reduction techniques were used for this study: All CT scans performed at this facility use one or all of the following: Automated exposure control, adjustment of the mA and/or kVp according to patient's size, iterative reconstruction. Findings: CBF < 30% volume:  5 ml   (core infarction volume greater than 50 cc associated with poor outcomes) Tmax > 6 seconds: 5 ml Tmax/CBF Mismatch Volume: 0 ml Tmax/CBF Mismatch Ratio: 1 Hypoperfusion Intensity Ratio: 0   (values greater than 0.5 associated with poor outcome) Tmax > 10 seconds Volume: 0 ml   (volume greater than 100 mL is associated with poor outcome)     Small area of matched defect in the right temporal lobe, corresponding to a focal hematoma noted on prior CT study. No mismatch defect. No evidence of significant core infarct or significant ischemic penumbra. Please note that the determination of patient treatment is not based solely upon imaging factors or calculation values. Management of ischemia is at the discretion of the primary physician and is based upon a combination of clinical and imaging data, along other factors.          Medications Administered     iopamidoL (ISOVUE-370) 76 % injection 100 mL     Admin Date  10/03/2021 Action  Given Dose  100 mL Route  IntraVENous Administered By  Colton Hurtado          labetaloL (NORMODYNE;TRANDATE) injection 10 mg     Admin Date  10/03/2021 Action  Given Dose  10 mg Route  IntraVENous Administered By  Khushbu Mensah RN          levETIRAcetam (KEPPRA) 500 mg in 0.9% sodium chloride (MBP/ADV) 100 mL MBP     Admin Date  10/03/2021 Action  New Bag Dose  500 mg Route  IntraVENous Administered By  Servando Diez RN          LORazepam (ATIVAN) injection 1 mg     Admin Date  10/03/2021 Action  Given Dose  1 mg Route  IntraVENous Administered By  Servando Diez RN           Admin Date  10/03/2021 Action  Given Dose  1 mg Route  IntraVENous Administered By  Servando Diez RN          morphine injection 2 mg     Admin Date  10/03/2021 Action  Given Dose  2 mg Route  IntraVENous Administered By  Servando Diez RN          [facility]ansetron [facility] injection 4 mg     Admin Date  10/03/2021 Action  Given Dose  4 mg Route  IntraVENous Administered By  Servando Diez RN          promethazine (PHENERGAN) injection 25 mg     Admin Date  10/03/2021 Action  Given Dose  25 mg Route  IntraMUSCular Administered By  Servando Diez RN          saline peripheral flush soln 10 mL     Admin Date  10/03/2021 Action  Given Dose  10 mL Route  InterCATHeter Administered By  Tenzin Bring          sodium chloride 0.9 % bolus infusion 100 mL     Admin Date  10/03/2021 Action  New Bag Dose  100 mL Route  IntraVENous Administered By  Tenzin Bring          sodium chloride 0.9 % bolus infusion 500 mL     Admin Date  10/03/2021 Action  New Bag Dose  500 mL Route  IntraVENous Administered By  Servando Diez RN                  Signed:  Katheryn Nobles MD    Part of this note may have been written by using a voice dictation software. The note has been proof read but may still contain some grammatical/other typographical errors.

## 2021-10-03 NOTE — ED PROVIDER NOTES
Patient is followed by ophthalmology has had a couple of eye procedures recently. For the past couple days to a week has had intermittent headache in the temples. Worse in the right temple. Has tried Tylenol without relief. Comes in tonight with bilateral pain. No eye pain or blurry vision. No chest pain or shortness of breath. No slurred speech or facial droop. Family does report some mild confusion which started this afternoon after lunch. The history is provided by the patient. No  was used. Headache   This is a new problem. The current episode started more than 2 days ago. The problem has been gradually worsening. The headache is aggravated by nothing. The pain is located in the bilateral and temporal region. The quality of the pain is described as sharp. The pain is moderate. Pertinent negatives include no fever, no malaise/fatigue, no chest pressure, no orthopnea, no palpitations, no shortness of breath, no weakness, no tingling, no dizziness, no visual change, no nausea and no vomiting. She has tried acetaminophen for the symptoms. The treatment provided mild relief. Past Medical History:   Diagnosis Date    Endocrine disease     hypothyroidism    Hypertension     Other ill-defined conditions     chronic back pain. No past surgical history on file. No family history on file.     Social History     Socioeconomic History    Marital status:      Spouse name: Not on file    Number of children: Not on file    Years of education: Not on file    Highest education level: Not on file   Occupational History    Not on file   Tobacco Use    Smoking status: Never Smoker   Substance and Sexual Activity    Alcohol use: No    Drug use: No    Sexual activity: Not on file   Other Topics Concern    Not on file   Social History Narrative    Not on file     Social Determinants of Health     Financial Resource Strain:     Difficulty of Paying Living Expenses: Food Insecurity:     Worried About Running Out of Food in the Last Year:     [address] in the Last Year:    Transportation Needs:     Lack of Transportation (Medical):  Lack of Transportation (Non-Medical):    Physical Activity:     Days of Exercise per Week:     Minutes of Exercise per Session:    Stress:     Feeling of Stress :    Social Connections:     Frequency of Communication with Friends and Family:     Frequency of Social Gatherings with Friends and Family:     Attends Oriental orthodox Services:     Active Member of Clubs or Organizations:     Attends Club or Organization Meetings:     Marital Status:    Intimate Partner Violence:     Fear of Current or Ex-Partner:     Emotionally Abused:     Physically Abused:     Sexually Abused: ALLERGIES: Patient has no known allergies. Review of Systems   Constitutional: Negative for chills, fever and malaise/fatigue. HENT: Negative for rhinorrhea and sore throat. Eyes: Negative for photophobia, pain, discharge, redness, itching and visual disturbance. Respiratory: Negative for chest tightness, shortness of breath and wheezing. Cardiovascular: Negative for chest pain, palpitations, orthopnea and leg swelling. Gastrointestinal: Negative for abdominal pain, diarrhea, nausea and vomiting. Genitourinary: Negative for dysuria and hematuria. Musculoskeletal: Negative for back pain, gait problem, neck pain and neck stiffness. Skin: Negative for color change and rash. Neurological: Positive for headaches. Negative for dizziness, tingling, weakness and numbness. Psychiatric/Behavioral: Positive for confusion. Vitals:    10/03/21 0154   BP: (!) 152/78   Pulse: 73   Resp: 18   Temp: 98.1 °F (36.7 °C)   SpO2: 95%   Weight: 74.4 kg (164 lb)   Height: 5' 5\" (1.651 m)            Physical Exam  Constitutional:       Appearance: Normal appearance. She is well-developed. HENT:      Head: Normocephalic and atraumatic. Eyes:      Extraocular Movements: Extraocular movements intact. Conjunctiva/sclera: Conjunctivae normal.      Pupils: Pupils are equal, round, and reactive to light. Cardiovascular:      Rate and Rhythm: Normal rate and regular rhythm. Pulmonary:      Effort: Pulmonary effort is normal.      Breath sounds: Normal breath sounds. Abdominal:      General: Bowel sounds are normal.      Palpations: Abdomen is soft. Tenderness: There is no abdominal tenderness. Musculoskeletal:         General: No swelling. Normal range of motion. Cervical back: Normal range of motion and neck supple. Skin:     General: Skin is warm and dry. Neurological:      General: No focal deficit present. Mental Status: She is alert and oriented to person, place, and time. GCS: GCS eye subscore is 4. GCS verbal subscore is 5. GCS motor subscore is 6. Cranial Nerves: No cranial nerve deficit. Motor: No weakness. MDM  Number of Diagnoses or Management Options  Acute nonintractable headache, unspecified headache type  Confusion  Intraparenchymal hematoma of brain, right, without loss of consciousness, initial encounter [facility])  Diagnosis management comments: Patient with intraparenchymal hemorrhage on the right. Questionable underlying mass. Versus hypertensive hemorrhagic stroke. Will consult neurosurgery and admit for further work-up. Neurosurgery consulted. Spoke with Dr. Tessa Kwan. No acute intervention at this time. Does recommend CTA CT brain perfusion for further evaluation. Will admit to hospitalist.    CTA shows aneurysm not contiguous with the intraparenchymal hemorrhage. No LVO. Will admit for further evaluation.     ICH (Intracerebral Hemorrhage Score)   Component ICH Score Points  GCS score 0  13-15 0  5-12 1  3-4 2  ICH volume, cm3             Use ABC/2 score   1  <30 0  =30 1  Intraventricular hemorrhage 0  No 0  Yes 1  Infratentorial origin of ICH 0  No 0  Yes 1  Age, years 1  <80 0  =80 1  Total ICH Score 0-6 2            ICH Score Mortality Prediction   6    100% mortality in studies  5    100% mortality in studies   4    97% mortality in studies   3    72% mortality in studies   2    26% mortality in studies   1    13% mortality in studies     ICH volume, volume on initial CT calculated using ABC/2 method. Amount and/or Complexity of Data Reviewed  Clinical lab tests: ordered and reviewed  Tests in the radiology section of CPT®: ordered and reviewed  Tests in the medicine section of CPT®: ordered and reviewed    Patient Progress  Patient progress: stable         Procedures          CTA HEAD NECK W WO CONT (Preliminary result)  Result time 10/03/21 04:58:07  ED Interpretation by Lamar Fry MD (10/03/21 04:58:07, [facility], Radiology)    Prelim:     1. 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 2. No evidence of vascular malformation is seen. No large vessel occlusion.                    CT PERF W CBF (Final result)  Result time 10/03/21 04:51:04  Final result by Lamar Fry MD (10/03/21 04:51:04)                Impression:      Small area of matched defect in the right temporal lobe, corresponding to a   focal hematoma noted on prior CT study. No mismatch defect. No evidence of   significant core infarct or significant ischemic penumbra. Please note that the determination of patient treatment is not based solely upon   imaging factors or calculation values. Management of ischemia is at the   discretion of the primary physician and is based upon a combination of clinical   and imaging data, along other factors. Narrative:    CT Perfusion Imaging     INDICATION:  Right temporal lobe hematoma. Headaches. Right-sided pain.      CT perfusion imaging of the brain was performed after the administration of   intravenous contrast.  Perfusion maps and perfusion analysis output were   generated using the [employer] perfusion processing software algorithm.   Radiation   dose reduction techniques were used for this study:  All CT scans performed at   this facility use one or all of the following: Automated exposure control,   adjustment of the mA and/or kVp according to patient's size, iterative   reconstruction. Findings:     CBF < 30% volume:  5 ml   (core infarction volume greater than 50 cc associated   with poor outcomes)   Tmax > 6 seconds: 5 ml   Tmax/CBF Mismatch Volume: 0 ml   Tmax/CBF Mismatch Ratio: 1   Hypoperfusion Intensity Ratio: 0   (values greater than 0.5 associated with poor   outcome)   Tmax > 10 seconds Volume: 0 ml   (volume greater than 100 mL is associated with   poor outcome)                       CT HEAD WO CONT (Final result)  Result time 10/03/21 03:10:37  Final result by Gamaliel Rivera MD (10/03/21 03:10:37)                Impression:      1. An approximately 2.3 cm intraparenchymal hematoma in the right temporal lobe,   of uncertain etiology but may be related to hypertension. Consider obtaining CT   angiogram of the head to exclude possibility of underlying aneurysm. Other   etiologies such as underlying mass and amyloid angiopathy could be considered. 2. No midline shift or hydrocephalus. [facility]   Findings discussed with ED physician, Dr. Alejandra Puente, at 3:05 AM on exam date. Narrative:    Noncontrast CT of the brain. COMPARISON: none     INDICATION: Headache     TECHNIQUE: Contiguous axial images were obtained from the skull base through the   vertex without IV contrast. Radiation dose reduction techniques were used for   this study:  Our CT scanners use one or all of the following: Automated exposure   control, adjustment of the mA and/or kVp according to patient's size, iterative   reconstruction.      FINDINGS:     There is acute intraparenchymal hematoma in the right temporal lobe, measuring   up to 2.0 x 2.3 x 1.7 cm. Associated surrounding edema. There is no midline shift or hydrocephalus. Cerebellum and brainstem are grossly   unremarkable. Periventricular hypodensities, nonspecific and likely due to chronic small   vessel changes. Included globes appear intact. Paranasal sinuses and the mastoid air cells are   aerated. There is no skull fracture.                     Results Include:    Recent Results (from the past 24 hour(s))   CBC WITH AUTOMATED DIFF    Collection Time: 10/03/21  3:05 AM   Result Value Ref Range    WBC 6.1 4.3 - 11.1 K/uL    RBC 3.83 (L) 4.05 - 5.2 M/uL    HGB 12.2 11.7 - 15.4 g/dL    HCT 36.8 35.8 - 46.3 %    MCV 96.1 79.6 - 97.8 FL    MCH 31.9 26.1 - 32.9 PG    MCHC 33.2 31.4 - 35.0 g/dL    RDW 12.0 11.9 - 14.6 %    PLATELET 203 299 - 026 K/uL    MPV 11.1 9.4 - 12.3 FL    ABSOLUTE NRBC 0.00 0.0 - 0.2 K/uL    DF AUTOMATED      NEUTROPHILS 67 43 - 78 %    LYMPHOCYTES 19 13 - 44 %    MONOCYTES 12 4.0 - 12.0 %    EOSINOPHILS 2 0.5 - 7.8 %    BASOPHILS 1 0.0 - 2.0 %    IMMATURE GRANULOCYTES 0 0.0 - 5.0 %    ABS. NEUTROPHILS 4.0 1.7 - 8.2 K/UL    ABS. LYMPHOCYTES 1.1 0.5 - 4.6 K/UL    ABS. MONOCYTES 0.7 0.1 - 1.3 K/UL    ABS. EOSINOPHILS 0.1 0.0 - 0.8 K/UL    ABS. BASOPHILS 0.0 0.0 - 0.2 K/UL    ABS. IMM. GRANS. 0.0 0.0 - 0.5 K/UL   METABOLIC PANEL, COMPREHENSIVE    Collection Time: 10/03/21  3:05 AM   Result Value Ref Range    Sodium 141 136 - 145 mmol/L    Potassium 4.1 3.5 - 5.1 mmol/L    Chloride 109 (H) 98 - 107 mmol/L    CO2 28 21 - 32 mmol/L    Anion gap 4 (L) 7 - 16 mmol/L    Glucose 101 (H) 65 - 100 mg/dL    BUN 27 (H) 8 - 23 MG/DL    Creatinine 1.81 (H) 0.6 - 1.0 MG/DL    GFR est AA 34 (L) >60 ml/min/1.73m2    GFR est non-AA 28 (L) >60 ml/min/1.73m2    Calcium 9.0 8.3 - 10.4 MG/DL    Bilirubin, total 0.4 0.2 - 1.1 MG/DL    ALT (SGPT) 16 12 - 65 U/L    AST (SGOT) 11 (L) 15 - 37 U/L    Alk.  phosphatase 79 50 - 136 U/L    Protein, total 7.2 6.3 - 8.2 g/dL Albumin 3.6 3.2 - 4.6 g/dL    Globulin 3.6 (H) 2.3 - 3.5 g/dL    A-G Ratio 1.0 (L) 1.2 - 3.5     C REACTIVE PROTEIN, QT    Collection Time: 10/03/21  3:05 AM   Result Value Ref Range    C-Reactive protein <0.3 0.0 - 0.9 mg/dL   SED RATE, AUTOMATED    Collection Time: 10/03/21  3:05 AM   Result Value Ref Range    Sed rate, automated 15 0 - 30 mm/hr   PROTHROMBIN TIME + INR    Collection Time: 10/03/21  3:28 AM   Result Value Ref Range    Prothrombin time 13.3 12.6 - 14.5 sec    INR 1.0     PTT    Collection Time: 10/03/21  3:28 AM   Result Value Ref Range    aPTT 28.9 24.1 - 35.1 SEC

## 2021-10-03 NOTE — ED NOTES
TRANSFER - OUT REPORT:    Verbal report given to 5th floor-BMT RN on Shara Ahumada  being transferred to 5th floor for routine progression of care       Report consisted of patients Situation, Background, Assessment and   Recommendations(SBAR). Information from the following report(s) ED Summary was reviewed with the receiving nurse. Lines:   Peripheral IV 10/03/21 Anterior;Proximal;Right Forearm (Active)        Opportunity for questions and clarification was provided.       Patient transported with:   Monitor  O2 @ 2 liters  Registered Nurse

## 2021-10-03 NOTE — PROGRESS NOTES
INITIAL SPIRITUAL ASSESSMENT     NOTED:      PATIENT IS BEING ADMITTED TO FLOOR FROM ER      [facility]      WILL ASSESS HOW WE CAN BEST SERVE THIS FAMILY

## 2021-10-03 NOTE — ED TRIAGE NOTES
Pt comes in c/o r sided eye pain/headache Stated she had eye surgery 3 weeks ago and the pain started since. Pt is unable to explain what was the surgery for. Stated the pain got severe even after she was taking Tylenol. Pt called her eye doctor and was told to come to the ED. Pt denied blurry vision, hx of CVA, TIA , migraines or any other complaints. Denied unilateral weakness or blurry vision at this time.

## 2021-10-04 ENCOUNTER — APPOINTMENT (OUTPATIENT)
Dept: NON INVASIVE DIAGNOSTICS | Age: 81
DRG: 064 | End: 2021-10-04
Attending: HOSPITALIST
Payer: MEDICARE

## 2021-10-04 ENCOUNTER — APPOINTMENT (OUTPATIENT)
Dept: CT IMAGING | Age: 81
DRG: 064 | End: 2021-10-04
Attending: NEUROLOGICAL SURGERY
Payer: MEDICARE

## 2021-10-04 LAB
ALBUMIN SERPL-MCNC: 3.1 G/DL (ref 3.2–4.6)
ALBUMIN/GLOB SERPL: 0.9 {RATIO} (ref 1.2–3.5)
ALP SERPL-CCNC: 68 U/L (ref 50–136)
ALT SERPL-CCNC: 11 U/L (ref 12–65)
ANION GAP SERPL CALC-SCNC: 7 MMOL/L (ref 7–16)
ANION GAP SERPL CALC-SCNC: 8 MMOL/L (ref 7–16)
APPEARANCE UR: ABNORMAL
AST SERPL-CCNC: 11 U/L (ref 15–37)
BACTERIA URNS QL MICRO: ABNORMAL /HPF
BASOPHILS # BLD: 0 K/UL (ref 0–0.2)
BASOPHILS NFR BLD: 0 % (ref 0–2)
BILIRUB SERPL-MCNC: 0.9 MG/DL (ref 0.2–1.1)
BILIRUB UR QL: NEGATIVE
BUN SERPL-MCNC: 22 MG/DL (ref 8–23)
BUN SERPL-MCNC: 23 MG/DL (ref 8–23)
CALCIUM SERPL-MCNC: 8.5 MG/DL (ref 8.3–10.4)
CALCIUM SERPL-MCNC: 8.7 MG/DL (ref 8.3–10.4)
CASTS URNS QL MICRO: ABNORMAL /LPF
CHLORIDE SERPL-SCNC: 106 MMOL/L (ref 98–107)
CHLORIDE SERPL-SCNC: 107 MMOL/L (ref 98–107)
CO2 SERPL-SCNC: 25 MMOL/L (ref 21–32)
CO2 SERPL-SCNC: 26 MMOL/L (ref 21–32)
COLOR UR: YELLOW
CREAT SERPL-MCNC: 1.53 MG/DL (ref 0.6–1)
CREAT SERPL-MCNC: 1.56 MG/DL (ref 0.6–1)
DIFFERENTIAL METHOD BLD: ABNORMAL
EOSINOPHIL # BLD: 0.1 K/UL (ref 0–0.8)
EOSINOPHIL NFR BLD: 1 % (ref 0.5–7.8)
EPI CELLS #/AREA URNS HPF: ABNORMAL /HPF
ERYTHROCYTE [DISTWIDTH] IN BLOOD BY AUTOMATED COUNT: 12.3 % (ref 11.9–14.6)
GLOBULIN SER CALC-MCNC: 3.5 G/DL (ref 2.3–3.5)
GLUCOSE SERPL-MCNC: 116 MG/DL (ref 65–100)
GLUCOSE SERPL-MCNC: 97 MG/DL (ref 65–100)
GLUCOSE UR STRIP.AUTO-MCNC: NEGATIVE MG/DL
HCT VFR BLD AUTO: 35 % (ref 35.8–46.3)
HGB BLD-MCNC: 11.3 G/DL (ref 11.7–15.4)
HGB UR QL STRIP: ABNORMAL
IMM GRANULOCYTES # BLD AUTO: 0 K/UL (ref 0–0.5)
IMM GRANULOCYTES NFR BLD AUTO: 0 % (ref 0–5)
KETONES UR QL STRIP.AUTO: 15 MG/DL
LEUKOCYTE ESTERASE UR QL STRIP.AUTO: NEGATIVE
LYMPHOCYTES # BLD: 1 K/UL (ref 0.5–4.6)
LYMPHOCYTES NFR BLD: 17 % (ref 13–44)
MAGNESIUM SERPL-MCNC: 2.1 MG/DL (ref 1.8–2.4)
MCH RBC QN AUTO: 32 PG (ref 26.1–32.9)
MCHC RBC AUTO-ENTMCNC: 32.3 G/DL (ref 31.4–35)
MCV RBC AUTO: 99.2 FL (ref 79.6–97.8)
MONOCYTES # BLD: 0.7 K/UL (ref 0.1–1.3)
MONOCYTES NFR BLD: 12 % (ref 4–12)
NEUTS SEG # BLD: 3.9 K/UL (ref 1.7–8.2)
NEUTS SEG NFR BLD: 68 % (ref 43–78)
NITRITE UR QL STRIP.AUTO: NEGATIVE
NRBC # BLD: 0 K/UL (ref 0–0.2)
OSMOLALITY SERPL: 299 MOSM/KG H2O (ref 280–301)
PH UR STRIP: 5.5 [PH] (ref 5–9)
PLATELET # BLD AUTO: 190 K/UL (ref 150–450)
PMV BLD AUTO: 10.9 FL (ref 9.4–12.3)
POTASSIUM SERPL-SCNC: 4.2 MMOL/L (ref 3.5–5.1)
POTASSIUM SERPL-SCNC: 4.7 MMOL/L (ref 3.5–5.1)
PROT SERPL-MCNC: 6.6 G/DL (ref 6.3–8.2)
PROT UR STRIP-MCNC: 100 MG/DL
RBC # BLD AUTO: 3.53 M/UL (ref 4.05–5.2)
RBC #/AREA URNS HPF: ABNORMAL /HPF
SODIUM SERPL-SCNC: 139 MMOL/L (ref 136–145)
SODIUM SERPL-SCNC: 140 MMOL/L (ref 136–145)
SODIUM SERPL-SCNC: 146 MMOL/L (ref 136–145)
SODIUM SERPL-SCNC: 146 MMOL/L (ref 136–145)
SP GR UR REFRACTOMETRY: 1.02 (ref 1–1.02)
UROBILINOGEN UR QL STRIP.AUTO: 0.2 EU/DL (ref 0.2–1)
WBC # BLD AUTO: 5.7 K/UL (ref 4.3–11.1)
WBC URNS QL MICRO: ABNORMAL /HPF

## 2021-10-04 PROCEDURE — 87086 URINE CULTURE/COLONY COUNT: CPT

## 2021-10-04 PROCEDURE — 87088 URINE BACTERIA CULTURE: CPT

## 2021-10-04 PROCEDURE — 81001 URINALYSIS AUTO W/SCOPE: CPT

## 2021-10-04 PROCEDURE — 70450 CT HEAD/BRAIN W/O DYE: CPT

## 2021-10-04 PROCEDURE — 84295 ASSAY OF SERUM SODIUM: CPT

## 2021-10-04 PROCEDURE — 85025 COMPLETE CBC W/AUTO DIFF WBC: CPT

## 2021-10-04 PROCEDURE — 74011250636 HC RX REV CODE- 250/636: Performed by: FAMILY MEDICINE

## 2021-10-04 PROCEDURE — 36415 COLL VENOUS BLD VENIPUNCTURE: CPT

## 2021-10-04 PROCEDURE — 80053 COMPREHEN METABOLIC PANEL: CPT

## 2021-10-04 PROCEDURE — 74011250636 HC RX REV CODE- 250/636

## 2021-10-04 PROCEDURE — 74011250636 HC RX REV CODE- 250/636: Performed by: EMERGENCY MEDICINE

## 2021-10-04 PROCEDURE — 74011250636 HC RX REV CODE- 250/636: Performed by: STUDENT IN AN ORGANIZED HEALTH CARE EDUCATION/TRAINING PROGRAM

## 2021-10-04 PROCEDURE — 74011250637 HC RX REV CODE- 250/637: Performed by: STUDENT IN AN ORGANIZED HEALTH CARE EDUCATION/TRAINING PROGRAM

## 2021-10-04 PROCEDURE — 74011250636 HC RX REV CODE- 250/636: Performed by: HOSPITALIST

## 2021-10-04 PROCEDURE — 65610000006 HC RM INTENSIVE CARE

## 2021-10-04 PROCEDURE — 92610 EVALUATE SWALLOWING FUNCTION: CPT

## 2021-10-04 PROCEDURE — 83735 ASSAY OF MAGNESIUM: CPT

## 2021-10-04 PROCEDURE — 87186 SC STD MICRODIL/AGAR DIL: CPT

## 2021-10-04 PROCEDURE — 99231 SBSQ HOSP IP/OBS SF/LOW 25: CPT | Performed by: NEUROLOGICAL SURGERY

## 2021-10-04 PROCEDURE — 83930 ASSAY OF BLOOD OSMOLALITY: CPT

## 2021-10-04 PROCEDURE — 74011000258 HC RX REV CODE- 258: Performed by: FAMILY MEDICINE

## 2021-10-04 RX ORDER — QUETIAPINE FUMARATE 25 MG/1
25 TABLET, FILM COATED ORAL 2 TIMES DAILY
Status: DISCONTINUED | OUTPATIENT
Start: 2021-10-04 | End: 2021-10-05

## 2021-10-04 RX ORDER — DIVALPROEX SODIUM 125 MG/1
125 CAPSULE, COATED PELLETS ORAL EVERY 12 HOURS
Status: DISCONTINUED | OUTPATIENT
Start: 2021-10-04 | End: 2021-10-22 | Stop reason: HOSPADM

## 2021-10-04 RX ORDER — OLANZAPINE 5 MG/1
10 TABLET, ORALLY DISINTEGRATING ORAL ONCE
Status: COMPLETED | OUTPATIENT
Start: 2021-10-04 | End: 2021-10-04

## 2021-10-04 RX ORDER — LORAZEPAM 2 MG/ML
0.5 INJECTION INTRAMUSCULAR ONCE
Status: COMPLETED | OUTPATIENT
Start: 2021-10-04 | End: 2021-10-04

## 2021-10-04 RX ORDER — LORAZEPAM 2 MG/ML
1 INJECTION INTRAMUSCULAR
Status: DISCONTINUED | OUTPATIENT
Start: 2021-10-04 | End: 2021-10-22 | Stop reason: HOSPADM

## 2021-10-04 RX ORDER — LORAZEPAM 2 MG/ML
INJECTION INTRAMUSCULAR
Status: ACTIVE
Start: 2021-10-04 | End: 2021-10-04

## 2021-10-04 RX ORDER — 3% SODIUM CHLORIDE 3 G/100ML
50 INJECTION, SOLUTION INTRAVENOUS CONTINUOUS
Status: DISPENSED | OUTPATIENT
Start: 2021-10-04 | End: 2021-10-04

## 2021-10-04 RX ORDER — LEVOTHYROXINE SODIUM 112 UG/1
112 TABLET ORAL
Status: DISCONTINUED | OUTPATIENT
Start: 2021-10-05 | End: 2021-10-09

## 2021-10-04 RX ADMIN — QUETIAPINE FUMARATE 25 MG: 25 TABLET ORAL at 09:34

## 2021-10-04 RX ADMIN — ONDANSETRON 8 MG: 2 INJECTION INTRAMUSCULAR; INTRAVENOUS at 05:10

## 2021-10-04 RX ADMIN — QUETIAPINE FUMARATE 25 MG: 25 TABLET ORAL at 17:30

## 2021-10-04 RX ADMIN — LORAZEPAM 1 MG: 2 INJECTION INTRAMUSCULAR; INTRAVENOUS at 21:54

## 2021-10-04 RX ADMIN — LORAZEPAM 0.5 MG: 2 INJECTION INTRAMUSCULAR; INTRAVENOUS at 11:38

## 2021-10-04 RX ADMIN — SODIUM CHLORIDE 50 ML/HR: 3 INJECTION, SOLUTION INTRAVENOUS at 04:11

## 2021-10-04 RX ADMIN — OLANZAPINE 10 MG: 5 TABLET, ORALLY DISINTEGRATING ORAL at 12:31

## 2021-10-04 RX ADMIN — ONDANSETRON 8 MG: 2 INJECTION INTRAMUSCULAR; INTRAVENOUS at 17:56

## 2021-10-04 RX ADMIN — LEVETIRACETAM 750 MG: 100 INJECTION, SOLUTION INTRAVENOUS at 05:10

## 2021-10-04 RX ADMIN — Medication 10 ML: at 22:00

## 2021-10-04 RX ADMIN — Medication 10 ML: at 05:10

## 2021-10-04 RX ADMIN — DIVALPROEX SODIUM 125 MG: 125 CAPSULE ORAL at 21:10

## 2021-10-04 RX ADMIN — HYDRALAZINE HYDROCHLORIDE 15 MG: 20 INJECTION INTRAMUSCULAR; INTRAVENOUS at 16:27

## 2021-10-04 RX ADMIN — LEVETIRACETAM 750 MG: 100 INJECTION, SOLUTION INTRAVENOUS at 17:56

## 2021-10-04 RX ADMIN — DIVALPROEX SODIUM 125 MG: 125 CAPSULE ORAL at 10:38

## 2021-10-04 NOTE — ROUTINE PROCESS
2151 [facility] paged due to concern for patient's agitation/confusion. Staff member needing to remain at bedside for patient safety concerns as pt continuously attempts to get out of bed and pull out IVs. Request for bedside sitter    0900 Pt with increasing agitation and aggressive behavior. Pt hitting staff, yelling and climbing over rails. Hospitalist repaged for safety orders for patient. Staff (x2) remain at patient's bedside. 1435 Medications and sitter order per MD.     [phone] MD notified of patient continued aggression and confusion. Pt attempting to pull out IVs, climb out of bed and hit staff members. Sitter remains at bedside. One-time dose of Ativan administered. 1230 STAT head CT order received. MD notified that patient remains in restraints and is combative and confused. Zyprexa ordered. Plan for STAT head CT scan and repeat Na level. 1300 Pt transporting for STAT head CT scan. Serum Na level sent. Pt appears to be resting comfortably in bed at this time. 1345 CT head complete, pt combative throughout transport. Serum Na 146.  Sitter remains at bedside 132.2

## 2021-10-04 NOTE — PROGRESS NOTES
Hospitalist Progress Note   Admit Date:  10/3/2021  2:16 AM   Name:  Shilpa Reyes   Age:  78 y.o. Sex:  female  :  1940   MRN:  [id number]   Room:  [facility]/    Presenting Complaint: Headache    Reason(s) for Admission: Intracranial bleed ([facility] [address] [I62.9]  Dementia ([facility] [address] [F03.90]  Poorly-controlled hypertension [I10]  Hypothyroidism [E03.9]  Encephalopathy, unspecified [G93.40]     Hospital Course & Interval History:   Shilpa Reyes is a 78 y.o. female with medical history of hypertension, dementia, hypothyroidism presented to emergency room with headache, worsening of confusion. Patient was complaining headache to family about right side which continued to get worse over the right temporal area. As per family patient is getting more agitated, confused but no history of slurring in speech, patient was brought to emergency room. Patient was evaluated, CT head shows evidence of right temporal parenchymal hemorrhage around 2 cm. 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 Patient continues to suffer from agitation and altered mental status in the setting of underlying dementia and acute right-sided temporal parenchymal hemorrhage with vasogenic edema and subdural hemorrhage. Patient had Code S called overnight 10/3 for severe headache associated with nausea and dry heaving. Patient was started on mannitol and Cardene to reduce intracranial pressure. Stat CT head showed no new changes. Patient remains agitated/combative requiring restraints and medication. Subjective (10/04/21):  Patient seen and examined at bedside this morning. Overnight events as detailed above with worsening mental status and reportedly worsening neurologic deficits.   No significant change on CT head compared to earlier MRI. Patient continues to remain combative/agitated and required bilateral wrist restraints this morning. She continues to call out in distress stating to \"cut these things off\", referring to her restraints. I informed her that we can take them off when she is no longer a threat to herself as she continues to try to pull out IVs and get up out of bed in the setting of intracranial bleed.   Assessment & Plan:     Principal Problem:   #Intracranial bleed     - CT head overnight unchanged from MRI showing R temporal lobe IPH with no midline shift or hydrocephalus  - CTA showing aneurysm but not in territory of IPH  - cont Keppra BID  - NSG following  - s/p Mannitol and 3% saline overnight - cont 3% saline and monitor Na q6h with goal Na 145-150 to reduce ICP - Na check pending   - repeat CT head today pending  - keep BP<140/90 with cardene gtt  - PT/OT/ST    Active Problems:    #Hypothyroidism   - cont home synthroid     #Encephalopathy, unspecified   - multifactorial in setting of underlying dementia and ICH  - start on seroquel and depakote BID  - tried low dose ativan prn with minimal effect  - try dose of zyprexa now  - cont to reorient frequently  - ordered sitter at bedside  - restraints prn for unsafe mobile attempts      #Dementia   - complicating current condition  - reorient frequently      Poorly-controlled hypertension   - cardene gtt for goal BP <140/90      Dispo/Discharge Planning:      PT/OT ordered    Diet:  ADULT DIET Dysphagia - Minced & Moist  DVT PPx: SCDs only in setting of bleed  Code status: Full Code    Hospital Problems as of 10/4/2021 Never Reviewed        Codes Class Noted - Resolved POA    Encephalopathy, unspecified ICD-10-CM: G93.40  ICD-9-CM: 348.30  10/3/2021 - Present Unknown        Dementia ([facility] ICD-10-CM: F03.90  ICD-9-CM: 294.20  10/3/2021 - Present Unknown        * (Principal) [facility] ICD-10-CM: I62.9  ICD-9-CM: 432.9 10/3/2021 - Present Unknown        Poorly-controlled hypertension ICD-10-CM: I10  ICD-9-CM: 401.9  10/3/2021 - Present Unknown        Hypothyroidism ICD-10-CM: E03.9  ICD-9-CM: 244.9  6/29/2012 - Present Unknown              Objective:     Patient Vitals for the past 24 hrs:   Temp Pulse Resp BP SpO2   10/04/21 1302  70 21 (!) 143/63 98 %   10/04/21 1144 98.6 °F (37 °C)       10/04/21 1102  84 27 (!) 140/68 94 %   10/04/21 1005  (!) 103 (!) 73 (!) 149/77 95 %   10/04/21 0905  84 (!) 35 (!) 152/59 95 %   10/04/21 0805  67 27 139/60 98 %   10/04/21 0703  70 28 138/67 97 %   10/04/21 0602 99.6 °F (37.6 °C) 72 22 135/62 96 %   10/04/21 0503  80 (!) 53 (!) 106/47 97 %   10/04/21 0439 (!) 100.6 °F (38.1 °C)       10/04/21 0402  66 13 (!) 127/51 99 %   10/04/21 0302 99.4 °F (37.4 °C) 70 (!) 34 (!) 112/54 97 %   10/04/21 0252  70 17 (!) 128/53 100 %   10/04/21 0241  68 12 (!) 143/46 99 %   10/04/21 0231  70 20 (!) 122/49 98 %   10/04/21 0221  69 17 (!) 131/50 99 %   10/04/21 0212  69 14 (!) 120/51 98 %   10/04/21 0202  69 13 (!) 105/44 98 %   10/04/21 0151  70 14 (!) 110/40 98 %   10/04/21 0142  72 13 (!) 96/42 99 %   10/04/21 0051  72 15 (!) 130/56 99 %   10/04/21 0041  73 17 (!) 127/47 98 %   10/04/21 0031  77 15 (!) 111/43 91 %   10/04/21 0021  74 16 (!) 122/47 93 %   10/04/21 0012  76 15 (!) 126/46 96 %   10/04/21 0002  75 15 (!) 125/48 97 %   10/03/21 2352  75 14 (!) 127/47 98 %   10/03/21 2341  80  (!) 119/53 98 %   10/03/21 2340   14     10/03/21 2331  81 13 119/64 97 %   10/03/21 2321  80  (!) 131/56 97 %   10/03/21 2312  79 13 (!) 120/58 96 %   10/03/21 2300 98.6 °F (37 °C) 75 (!) 33 (!) 138/57 97 %   10/03/21 2246  85 29 (!) 143/69 95 %   10/03/21 2231  80 16 (!) 143/70 96 %   10/03/21 2226  81 24 (!) 140/56 92 %   10/03/21 2156  82 28 (!) 142/61 97 %   10/03/21 2145  84 26 (!) 157/56 97 %   10/03/21 2131  78 (!) 47 (!) 140/59 97 %   10/03/21 2116  68 18 (!) 133/54 99 %   10/03/21 2101  61 15 (!) 146/71 99 %   10/03/21 2045  60 17 (!) 147/58 97 %   10/03/21 2030  (!) 58 14 (!) 139/56 97 %   10/03/21 2016  66 29 131/67 97 %   10/03/21 2001 99.1 °F (37.3 °C) 64 17 (!) 143/68 96 %   10/03/21 1945     97 %   10/03/21 1722  64  (!) 116/57 97 %     Oxygen Therapy  O2 Sat (%): 98 % (10/04/21 1302)  Pulse via Oximetry: 70 beats per minute (10/04/21 1302)  O2 Device: Nasal cannula (10/04/21 0805)  Skin Assessment: Clean, dry, & intact (10/03/21 2001)  Skin Protection for O2 Device: N/A (10/03/21 2001)  O2 Flow Rate (L/min): 2 l/min (10/04/21 0805)    Estimated body mass index is 26.52 kg/m² as calculated from the following:    Height as of this encounter: 5' 5\" (1.651 m). Weight as of this encounter: 72.3 kg (159 lb 6.3 oz). Intake/Output Summary (Last 24 hours) at 10/4/2021 1311  Last data filed at 10/4/2021 1144  Gross per 24 hour   Intake 172.5 ml   Output 1400 ml   Net -1227.5 ml         Physical Exam:   General:    Well nourished. Agitated, combative  Head:  Normocephalic, atraumatic  Eyes:  Sclerae appear normal.  Pupils equally round. ENT:  Nares appear normal, no drainage. Moist oral mucosa  Neck:  No restricted ROM. Trachea midline   CV:   RRR. No m/r/g. No jugular venous distension. Lungs:   CTAB. No wheezing, rhonchi, or rales. Respirations even, unlabored  Abdomen: Bowel sounds present. Soft, nontender, nondistended. Extremities: No cyanosis or clubbing. No edema  Skin:     No rashes and normal coloration. Warm and dry. Neuro:  Cranial nerves II-XII grossly intact. Strength and Sensation intact. Refusing to answer orientation questions.  EOMi  Psych:  Anxious, agitated     I have reviewed ordered lab tests and independently visualized imaging below:    Last 24hr Labs:  Recent Results (from the past 24 hour(s))   GLUCOSE, POC    Collection Time: 10/03/21  9:44 PM   Result Value Ref Range    Glucose (POC) 110 (H) 65 - 100 mg/dL    Performed by [facility]    METABOLIC PANEL, BASIC    Collection Time: 10/03/21 11:07 PM   Result Value Ref Range    Sodium 139 136 - 145 mmol/L    Potassium 4.7 3.5 - 5.1 mmol/L    Chloride 106 98 - 107 mmol/L    CO2 26 21 - 32 mmol/L    Anion gap 7 7 - 16 mmol/L    Glucose 116 (H) 65 - 100 mg/dL    BUN 23 8 - 23 MG/DL    Creatinine 1.53 (H) 0.6 - 1.0 MG/DL    GFR est AA 42 (L) >60 ml/min/1.73m2    GFR est non-AA 34 (L) >60 ml/min/1.73m2    Calcium 8.5 8.3 - 67.4 MG/DL   METABOLIC PANEL, COMPREHENSIVE    Collection Time: 10/04/21  5:31 AM   Result Value Ref Range    Sodium 140 136 - 145 mmol/L    Potassium 4.2 3.5 - 5.1 mmol/L    Chloride 107 98 - 107 mmol/L    CO2 25 21 - 32 mmol/L    Anion gap 8 7 - 16 mmol/L    Glucose 97 65 - 100 mg/dL    BUN 22 8 - 23 MG/DL    Creatinine 1.56 (H) 0.6 - 1.0 MG/DL    GFR est AA 41 (L) >60 ml/min/1.73m2    GFR est non-AA 34 (L) >60 ml/min/1.73m2    Calcium 8.7 8.3 - 10.4 MG/DL    Bilirubin, total 0.9 0.2 - 1.1 MG/DL    ALT (SGPT) 11 (L) 12 - 65 U/L    AST (SGOT) 11 (L) 15 - 37 U/L    Alk. phosphatase 68 50 - 136 U/L    Protein, total 6.6 6.3 - 8.2 g/dL    Albumin 3.1 (L) 3.2 - 4.6 g/dL    Globulin 3.5 2.3 - 3.5 g/dL    A-G Ratio 0.9 (L) 1.2 - 3.5     MAGNESIUM    Collection Time: 10/04/21  5:31 AM   Result Value Ref Range    Magnesium 2.1 1.8 - 2.4 mg/dL   CBC WITH AUTOMATED DIFF    Collection Time: 10/04/21  5:31 AM   Result Value Ref Range    WBC 5.7 4.3 - 11.1 K/uL    RBC 3.53 (L) 4.05 - 5.2 M/uL    HGB 11.3 (L) 11.7 - 15.4 g/dL    HCT 35.0 (L) 35.8 - 46.3 %    MCV 99.2 (H) 79.6 - 97.8 FL    MCH 32.0 26.1 - 32.9 PG    MCHC 32.3 31.4 - 35.0 g/dL    RDW 12.3 11.9 - 14.6 %    PLATELET 906 309 - 190 K/uL    MPV 10.9 9.4 - 12.3 FL    ABSOLUTE NRBC 0.00 0.0 - 0.2 K/uL    DF AUTOMATED      NEUTROPHILS 68 43 - 78 %    LYMPHOCYTES 17 13 - 44 %    MONOCYTES 12 4.0 - 12.0 %    EOSINOPHILS 1 0.5 - 7.8 %    BASOPHILS 0 0.0 - 2.0 %    IMMATURE GRANULOCYTES 0 0.0 - 5.0 %    ABS.  NEUTROPHILS 3.9 1.7 - 8.2 K/UL    ABS. LYMPHOCYTES 1.0 0.5 - 4.6 K/UL    ABS. MONOCYTES 0.7 0.1 - 1.3 K/UL    ABS. EOSINOPHILS 0.1 0.0 - 0.8 K/UL    ABS. BASOPHILS 0.0 0.0 - 0.2 K/UL    ABS. IMM. GRANS. 0.0 0.0 - 0.5 K/UL   OSMOLALITY, SERUM/PLASMA    Collection Time: 10/04/21  5:31 AM   Result Value Ref Range    Osmolality, serum/plasma 299 280 - 301 MOSM/kg H2O       All Micro Results     None          Other Studies:  CT CODE NEURO HEAD WO CONTRAST    Result Date: 10/3/2021  Clinical history: Confusion. Change in mental status. TECHNIQUE: Axial coronal CT of the brain without IV contrast.. CT dose reduction was achieved through use of a standardized protocol tailored for this examination and automatic exposure control for dose modulation. COMPARISON: Earlier today. FINDINGS: There is acute intraparenchymal hematoma in the right temporal lobe, measuring up to 3 cm. There is associated edema. There is no midline shift or hydrocephalus. The cerebellum and brainstem are grossly unremarkable. Periventricular hypodensities, nonspecific and likely due to chronic small vessel changes. Included globes appear intact. The paranasal sinuses and the mastoid air cells are aerated. Surrounding bones are intact. 1. Intraparenchymal hematoma in the right temporal lobe, without substantial change when compared to MRI from earlier today. No midline shift or hydrocephalus.       Current Meds:  Current Facility-Administered Medications   Medication Dose Route Frequency    QUEtiapine (SEROquel) tablet 25 mg  25 mg Oral BID    divalproex (DEPAKOTE SPRINKLE) capsule 125 mg  125 mg Oral Q12H    LORazepam (ATIVAN) 2 mg/mL injection        sodium chloride (NS) flush 5-40 mL  5-40 mL IntraVENous Q8H    sodium chloride (NS) flush 5-40 mL  5-40 mL IntraVENous PRN    labetaloL (NORMODYNE;TRANDATE) injection 10 mg  10 mg IntraVENous Q4H PRN    hydrALAZINE (APRESOLINE) 20 mg/mL injection 15 mg  15 mg IntraVENous Q6H PRN    0.9% sodium chloride infusion 50 mL/hr IntraVENous CONTINUOUS    metoclopramide HCl (REGLAN) injection 5 mg  5 mg IntraVENous Q6H PRN    [Held by provider] amLODIPine (NORVASC) tablet 5 mg  5 mg Oral DAILY    levETIRAcetam (KEPPRA) 750 mg in 0.9% sodium chloride 100 mL IVPB  750 mg IntraVENous Q12H    niCARdipine (CARDENE) 25 mg in 0.9% sodium chloride (MBP/ADV) 250 mL infusion  0-15 mg/hr IntraVENous TITRATE    ondansetron (ZOFRAN) injection 8 mg  8 mg IntraVENous Q8H PRN       Signed:  Jero Mascorro MD    Part of this note may have been written by using a voice dictation software. The note has been proof read but may still contain some grammatical/other typographical errors.

## 2021-10-04 NOTE — PROGRESS NOTES
IV Access     Discussed with primary care RN, Corrina Santana., patient has appropriate IV access at this time. Will discontinue PICC order. [address] [facility]  [place]arosa

## 2021-10-04 NOTE — PROGRESS NOTES
LTG: Patient will tolerate least restrictive diet without overt signs or symptoms of airway compromise. STG: Patient will tolerate minced and moist diet with thin liquids without overt signs or symptoms of airway compromise. STG: Patient will participate in trials of chewable textures for potential diet advancement. SPEECH LANGUAGE PATHOLOGY: DYSPHAGIA- Initial Assessment    NAME/AGE/GENDER: Portia Shankar is a 78 y.o. female  DATE: 10/4/2021  PRIMARY DIAGNOSIS: Intracranial bleed ([facility] [address] [I62.9]  Dementia ([facility] [address] [F03.90]  Poorly-controlled hypertension [I10]  Hypothyroidism [E03.9]  Encephalopathy, unspecified [G93.40]      ICD-10: Treatment Diagnosis: R13.11 Dysphagia, Oral Phase    RECOMMENDATIONS   DIET:    Minced and Moist   Thin Liquids    MEDICATIONS: as tolerated     PRECAUTIONS, MODIFICATIONS, AND STRATEGIES  · Slow rate of intake  · Small bites/sips  · Upright at 90 degrees during meal  · 1:1 assistance with meals  ·      RECOMMENDATIONS FOR CONTINUED SPEECH THERAPY:   YES: Anticipate need for ongoing speech therapy during this hospitalization. ASSESSMENT   Patient presents with oral dysphagia characterized by impaired mastication related to missing dentition and confusion. Soft chewable texture were swallowed whole without mastication. Limited ability to respond to verbal prompts given confusion and agitation. No s/sx of pharyngeal dysphaiga with thin liquids or puree. .     Recommend minced and moist diet with thin liquids. Medications as tolerated. Plans to follow for ongoing po trials during this admission in attempt to advance diet. Anticipate cognitive-linguistic evaluation in next 1-2 visits. EDUCATION:  · Recommendations discussed with Patient and RN    REHABILITATION POTENTIAL FOR STATED GOALS: Good    PLAN    FREQUENCY/DURATION:   Continue to follow patient 2 times a week for duration of hospital stay to address above goals.  Recommendations for next treatment session: Next treatment session will address diet tolerance, po trials and assessment of cognitive-linguistic abilities     CONTINUATION OF SKILLED SERVICES/MEDICAL NECESSITY:   Patient is expected to demonstrate progress in  swallow strength, swallow timeliness, swallow function, diet tolerance and swallow safety in order to  improve swallow safety, work toward diet advancement and decrease aspiration risk.  Patient continues to require skilled intervention due to  oral dysphagia, confusion, and agitation. SUBJECTIVE   Patient initially resting quietly in bed with sitter at bedside. After waking, patient became agitated including yelling, hitting, and kicking. Sitting, CNA, and RN present to assist/    Oxygen Device: nasal cannula  Pain: Pain Scale 1: Numeric (0 - 10)  Pain Intensity 1: 0    History of Present Injury/Illness: Ms. Debbie Caba  has a past medical history of Endocrine disease, Hypertension, and Other ill-defined conditions. . She also  has no past surgical history on file. PRECAUTIONS/ALLERGIES: Patient has no known allergies. Problem List:  (Impairments causing functional limitations):  1. Oral dysphagia due to missing dentition    Previous Dysphagia: NONE REPORTED  Diet Prior to Evaluation: NPO pending SLP evaluation    Orientation:  Person  Place  Disoriented to situation    Cognitive-Linguistic Screening:   Alertness  o Alert   Speech Production:   o Fully intelligible   Expressive Language:  o Fluent speech and Able to effectively communicate wants and needs   Receptive Language:  o Answers yes/no questions appropriately and Follows commands   Cognition:   o Agitated during evaluation. Unable to re-direct with verbal or tactile cues. Poor reasoning. Prior Level of Function: Arrived to ED from home. Specific details on PLF unknown and unable to be obtained from patient.    Recommendations: Given results of screening, further evaluation is indicated to assess cognitive-linguistic abilities. OBJECTIVE   Oral Motor:   · Labial: No impairment  · Dentition: Edentulous  · Oral Hygiene: Dry  · Lingual: No impairment  · COMMENTS: Reports she typically wears dentures for po intake    Dysphagia evaluation:   Patient consumed trials of thin liquids by tsp/cup/straw, puree, and soft chewables. Edentulous during evaluation, but reports she typically wears dentures at home. Appropriate acceptance and oral clearance with thin liquids and puree trials. She rolled chewable textures around in her mouth and ultimately swallowed fruit whole without breakdown. Attempted to cue for mastication during additional trial; however, she became increasingly agitated and unable to participate in cues. Additional chewable trials deferred/     Tool Used: Dysphagia Outcome and Severity Scale (KEVIN)    Score Comments   Normal Diet  [] 7 With no strategies or extra time needed   Functional Swallow  [] 6 May have mild oral or pharyngeal delay   Mild Dysphagia  [] 5 Which may require one diet consistency restricted    Mild-Moderate Dysphagia  [] 4 With 1-2 diet consistencies restricted   Moderate Dysphagia  [] 3 With 2 or more diet consistencies restricted   Moderate-Severe Dysphagia  [] 2 With partial PO strategies (trials with ST only)   Severe Dysphagia  [] 1 With inability to tolerate any PO safely      Score:  Initial: 4 Most Recent: x (Date 10/04/21 )   Interpretation of Tool: The Dysphagia Outcome and Severity Scale (KEVIN) is a simple, easy-to-use, 7-point scale developed to systematically rate the functional severity of dysphagia based on objective assessment and make recommendations for diet level, independence level, and type of nutrition. Current Medications:   No current facility-administered medications on file prior to encounter.      Current Outpatient Medications on File Prior to Encounter   Medication Sig Dispense Refill    calcium-vitamin D (OYSTER SHELL) 500 mg(1,250mg) -200 unit per tablet Take 1 Tab by mouth three (3) times daily (with meals). 90 Tab 0    traMADol (ULTRAM) 50 mg tablet Take 1 Tab by mouth every six (6) hours as needed for Pain. 60 Tab 0    amLODIPine (NORVASC) 5 mg tablet Take 5 mg by mouth daily.  levothyroxine (SYNTHROID) 112 mcg tablet Take 112 mcg by mouth Daily (before breakfast).             INTERDISCIPLINARY COLLABORATION: RN and MD    After treatment position/precautions:  · Upright in bed  · Restraints in place  · Mitts in place  · RN, CNA, and sitter at bedside    Total Treatment Duration:   Time In: 7301  Time Out: 9651    August Siegel, [facility] FANG REYES, KATHY-SLP  Speech Language Pathologist  Acute Rehabilitation Services  Contact: Leeann

## 2021-10-04 NOTE — PROGRESS NOTES
There is a high probability of acute organ impairment or life-threatening deterioration in the patient's condition from: hemorrhagic CVA and cardiopulmonary arrest     78year old white female admitted with severe HA found to have right sided temporal parenchymal hemorrhage with vasogenic edema and SDH. Code stroke called due to severe HA associated with nausea and dry heaving plus new neurological deficits including vision loss. Critical care interventions: stat repeat neuroimaging, start mannitol, start cardene to achieve BP <140. Repeat BMP, raise sodium 145-150 to reduce ICP. Discussed with tele-neurology. Increase keppra 750 mg q12. If final CT report shows new changes, will call Dr. Angy Adams     Total critical care time spent: 33 minutes. Time is indicative of direct patient attendance at bedside and on the patient's floor nearby. Includes time spent at bedside performing history and exam, performing chart review, discussing findings and treatment plan with patient and/or family, discussing patient with consultants and colleagues, ordering and reviewing pertinent laboratory and radiographic evaluations, and discussing patient with nursing staff. Time excludes procedures.

## 2021-10-04 NOTE — PROGRESS NOTES
Physical Therapy Note:    Physical therapy evaluation orders received and chart reviewed. Per chart review, patient not appropriate for evaluation today secondary to agitation/confusion with STAT head CT ordered. Will follow and re-attempt at a later time/date when medically appropriate.  Thank you,    Dipak Argueta, PT, DPT

## 2021-10-04 NOTE — PROGRESS NOTES
11: 00AM- Echo attempted but patient was agitated and violent. Nurse to call when patient might be able to tolerate. 2:15PM- Spoke to nurse and decided we would give echo another try since patient had some medications. When I got into the room patient was moving all around and agitated still. Spoke to nurse and we agreed to wait on echo until patient would be able to tolerate. Echo is placed on hold and will continue to follow up with nurses to try again at a later time.

## 2021-10-04 NOTE — PROGRESS NOTES
10/03/21 1945   Vitals   O2 Sat (%) 97 %   Cardiac Rhythm Sinus Rhythm   Electrodes Replaced Yes   Assessment  Type   Assessment Type Admission assessment  (PATIENT HANDOFF)   Neuro   Neurologic State Eyes open spontaneously; Alert   Orientation Level Oriented to person;Oriented to place;Oriented to situation;Disoriented to time   Cognition Appropriate for age attention/concentration; Other (comment)  (PT HAS HX OF DEMENTIA)   Speech Clear   Facial Droop Normal   Assessment L Pupil Brisk;Round   Size L Pupil (mm) 3   Assessment R Pupil Brisk;Round   Size R Pupil (mm) 3   LUE Motor Response Purposeful   Sensation Decreased   LLE Motor Response Purposeful;Weak   Sensation Decreased   RUE Motor Response Purposeful   Sensation Decreased   RLE Motor Response Purposeful;Weak   Sensation Decreased   Seizure Activity   Seizure Precautions?  1   Nayana Coma Scale   Eye Opening 4   Best Verbal Response 4  (REQUIRES REORIENTATION TO TIME)   Best Motor Response 6   Nayana Coma Scale Score 14   Cranial Nerves   Eye Assessment Focuses with eyes;Tracks with eyes   Eye Opening Spontaneously   Cranial Nerve Assessments Tongue midline   NIH Stroke Scale   Interval Handoff/Transfer   Level of Conciousness (1a) 0   LOC Questions (1b) 0   LOC Commands (1c) 0   Best Gaze (2) 0   Visual (3) 0   Facial Palsy (4) 0   Motor Arm, Left (5a) 0   Motor Arm, Right (5b) 0   Motor Leg, Left (6a) 0   Motor Leg, Right (6b) 0   Limb Ataxia (7) 0   Sensory (8) (!) 1   Best Language (9) 0   Dysarthria (10) 0   Extinction and Inattention (11) 0   Total 1   Peripheral Vascular   Peripheral Vascular (WDL) WDL

## 2021-10-04 NOTE — PROGRESS NOTES
TRANSFER - IN REPORT:    Verbal report received from MAHENDRA Thomas(name) on Tete Rand  being received from ED(unit) for routine progression of care      Report consisted of patients Situation, Background, Assessment and   Recommendations(SBAR). Information from the following report(s) SBAR, Kardex, ED Summary, MAR, Recent Results, Cardiac Rhythm SR, Quality Measures and Dual Neuro Assessment was reviewed with the receiving nurse. Opportunity for questions and clarification was provided. Assessment completed upon patients arrival to unit and care assumed. Pt arrived via stretcher, on 2L NC, transferred to bed, and placed on  Monitors. VSS, dual NIH with ED nurse completed at bedside, see emar. Dual skin assessment completed with Yg Freire RN.  No skin issues noted, Allevyn placed over sacrum

## 2021-10-04 NOTE — PROGRESS NOTES
's follow-up visit. Ms. Jayce Vallejo was confused and the nurse was at bedside. Chaplains remain available for support.       Myah Lewis 66  Board Certified

## 2021-10-04 NOTE — PROGRESS NOTES
NEUROSURGERY PROGRESS NOTE:   Admit Date: 10/3/2021  Subjective:   Patient more agitated this am requiring ativan and Zyprexa    Objective:  Visit Vitals  BP (!) 141/74   Pulse 77   Temp 98.6 °F (37 °C)   Resp (!) 35   Ht 5' 5\" (1.651 m)   Wt 159 lb 6.3 oz (72.3 kg)   SpO2 97%   BMI 26.52 kg/m²     General: No acute distress  Agitated,   Patient tells nurses and myself to leave her alone and calling us names  Eyes Closed   Closed them tightly and grimaces in response in trying to examine her eyes   Patient moves all extremities purposefully in response to noxious stimuli   Does not follow commands or cooperate with formal motor exam currently in mittens and soft extremity restraints. Assessment and Plan:   Adele Held 78 y.o. female who presented with headache yesteday morning which did not improve throughout the day with Tylenol. She underwent a CT Head WO contrast that demonstrates a right temporal intraparenchymal hemorrhage and subdural hematoma that has remained stable on interval repeat imaging. She underwent MRI and CTA Head and Neck that demonstrated a right sided proximal ICA aneurysm which is remote to the hemorrhage and unrelated. - Recommend Head of Bed to 30 degrees  - Recommend holding anti-platelet and anti-coagulant medications   - Recommend Keppra 500 mg BID for seizure prophylaxis   - No acute neurosurgical intervention necessary at this time.   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

## 2021-10-04 NOTE — DISCHARGE INSTRUCTIONS
Stroke: After Your Visit     Your Care Instructions     Risk factors for stroke include being overweight, smoking, and sedentary lifestyle. This means that the blood flow to a part of your brain was blocked for some time, which damages the nerve cells in that part of the brain. The part of your body controlled by that part of your brain may not function properly now. The brain is an amazing organ that can heal itself to some degree. The stroke you had damaged part of your brain, but other parts of your brain may take over in some way for the damaged areas. You have already started this process. Going home may be hard for you and your family. The more you can try to do for yourself, the better. Remember to take each day one at a time. Follow-up care is a key part of your treatment and safety. Be sure to make and go to all appointments, and call your doctor if you are having problems. Its also a good idea to know your test results and keep a list of the medicines you take. How can you care for yourself at home? Enter a stroke rehabilitation (rehab) program, if your doctor recommends it. Physical, speech, and occupational therapies can help you manage bathing, dressing, eating, and other basics of daily living. Eat a heart-healthy diet that is low in cholesterol, saturated fat, and salt. Eat lots of fresh fruits and vegetables and foods high in fiber. Increase your activities slowly. Take short rest breaks when you get tired. Gradually increase the amount you walk. Start out by walking a little more than you did the day before. Do not drive until your doctor says it is okay. It is normal to feel sad or depressed after a stroke. If the blues last, talk to your doctor. If you are having problems with urine leakage, go to the bathroom at regular times, including when you first wake up and at bedtime. Also, limit fluids after dinner.   If you are constipated, drink plenty of fluids, enough so that your urine is light yellow or clear like water. If you have kidney, heart, or liver disease and have to limit fluids, talk with your doctor before you increase the amount of fluids you drink. Set up a regular time for using the toilet. If you continue to have constipation, your doctor may suggest using a bulking agent, such as Metamucil, or a stool softener, laxative, or enema. Medicines  Take your medicines exactly as prescribed. Call your doctor if you think you are having a problem with your medicine. You may be taking several medicines. ACE (angiotensin-converting enzyme) inhibitors, angiotensin II receptor blockers (ARBs), beta-blockers, diuretics (water pills), and calcium channel blockers control your blood pressure. Statins help lower cholesterol. Your doctor may also prescribe medicines for depression, pain, sleep problems, anxiety, or agitation. If your doctor has given you medicine that prevents blood clots, such as warfarin (Coumadin), aspirin combined with extended-release dipyridamole (Aggrenox), clopidogrel (Plavix), or aspirin to prevent another stroke, you should:  Tell your dentist, pharmacist, and other health professionals that you take these medicines. Watch for unusual bruising or bleeding, such as blood in your urine, red or black stools, or bleeding from your nose or gums. Get regular blood tests to check your clotting time if you are taking Coumadin. Wear medical alert jewelry that says you take blood thinners. You can buy this at most drugstores. Do not take any over-the-counter medicines or herbal products without talking to your doctor first.  If you take birth control pills or hormone replacement therapy, talk to your doctor about whether they are right for you. For family members and caregivers  Make the home safe. Set up a room so that your loved one does not have to climb stairs. Be sure the bathroom is on the same floor.  Move throw rugs and furniture that could cause falls, and make sure that the lighting is good. Put grab bars and seats in tubs and showers. Find out what your loved one can do and what he or she needs help with. Try not to do things for your loved one that your loved one can do on his or her own. Help him or her learn and practice new skills. Visit and talk with your loved one often. Try doing activities together that you both enjoy, such as playing cards or board games. Keep in touch with your loved one's friends as much as you can, and encourage them to visit. Take care of yourself. Do not try to do everything yourself. Ask other family members to help. Eat well, get enough rest, and take time to do things that you enjoy. Keep up with your own doctor visits, and make sure to take your medicines regularly. Get out of the house as much as you can. Join a local support group. Find out if you qualify for home health care visits to help with rehab or for adult day care. When should you call for help? Call 911 anytime you think you may need emergency care. For example, call if:  You have signs of another stroke. These may include:  Sudden numbness, paralysis, or weakness in your face, arm, or leg, especially on only one side of your body. New problems with walking or balance. Sudden vision changes. Drooling or slurred speech. New problems speaking or understanding simple statements, or you feel confused. A sudden, severe headache that is different from past headaches. Call 911 even if these symptoms go away in a few minutes. You cough up blood. You vomit blood or what looks like coffee grounds. You pass maroon or very bloody stools. Call your doctor now or seek immediate medical care if:  You have new bruises or blood spots under your skin. You have a nosebleed. Your gums bleed when you brush your teeth. You have blood in your urine. Your stools are black and tarlike or have streaks of blood.   You have vaginal bleeding when you are not having your period, or heavy period bleeding. You have new symptoms that may be related to your stroke, such as falls or trouble swallowing. Watch closely for changes in your health, and be sure to contact your doctor if you have any problems. Where can you learn more? Go to [employer]    Enter C294  in the search box to learn more about \"Stroke: After Your Visit\". © 1351-5393 [employer], [employer]orporated. Care instructions adapted under license by [facility] (which disclaims liability or warranty for this information). This care instruction is for use with your licensed healthcare professional. If you have questions about a medical condition or this instruction, always ask your healthcare professional. Eddie Haines any warranty or liability for your use of this information.

## 2021-10-05 ENCOUNTER — APPOINTMENT (OUTPATIENT)
Dept: CT IMAGING | Age: 81
DRG: 064 | End: 2021-10-05
Attending: STUDENT IN AN ORGANIZED HEALTH CARE EDUCATION/TRAINING PROGRAM
Payer: MEDICARE

## 2021-10-05 LAB
ANION GAP SERPL CALC-SCNC: 11 MMOL/L (ref 7–16)
BASOPHILS # BLD: 0 K/UL (ref 0–0.2)
BASOPHILS NFR BLD: 0 % (ref 0–2)
BUN SERPL-MCNC: 38 MG/DL (ref 8–23)
CALCIUM SERPL-MCNC: 9.3 MG/DL (ref 8.3–10.4)
CHLORIDE SERPL-SCNC: 114 MMOL/L (ref 98–107)
CO2 SERPL-SCNC: 22 MMOL/L (ref 21–32)
CREAT SERPL-MCNC: 1.52 MG/DL (ref 0.6–1)
DIFFERENTIAL METHOD BLD: ABNORMAL
EOSINOPHIL # BLD: 0 K/UL (ref 0–0.8)
EOSINOPHIL NFR BLD: 0 % (ref 0.5–7.8)
ERYTHROCYTE [DISTWIDTH] IN BLOOD BY AUTOMATED COUNT: 12.1 % (ref 11.9–14.6)
GLUCOSE SERPL-MCNC: 126 MG/DL (ref 65–100)
HCT VFR BLD AUTO: 35.6 % (ref 35.8–46.3)
HGB BLD-MCNC: 11.5 G/DL (ref 11.7–15.4)
IMM GRANULOCYTES # BLD AUTO: 0.1 K/UL (ref 0–0.5)
IMM GRANULOCYTES NFR BLD AUTO: 1 % (ref 0–5)
LYMPHOCYTES # BLD: 0.6 K/UL (ref 0.5–4.6)
LYMPHOCYTES NFR BLD: 11 % (ref 13–44)
MCH RBC QN AUTO: 31.9 PG (ref 26.1–32.9)
MCHC RBC AUTO-ENTMCNC: 32.3 G/DL (ref 31.4–35)
MCV RBC AUTO: 98.9 FL (ref 79.6–97.8)
MONOCYTES # BLD: 0.5 K/UL (ref 0.1–1.3)
MONOCYTES NFR BLD: 9 % (ref 4–12)
NEUTS SEG # BLD: 4.5 K/UL (ref 1.7–8.2)
NEUTS SEG NFR BLD: 79 % (ref 43–78)
NRBC # BLD: 0 K/UL (ref 0–0.2)
PLATELET # BLD AUTO: 171 K/UL (ref 150–450)
PLATELET COMMENTS,PCOM: SLIGHT
PMV BLD AUTO: 10.8 FL (ref 9.4–12.3)
POTASSIUM SERPL-SCNC: 4.1 MMOL/L (ref 3.5–5.1)
RBC # BLD AUTO: 3.6 M/UL (ref 4.05–5.2)
RBC MORPH BLD: ABNORMAL
SODIUM SERPL-SCNC: 146 MMOL/L (ref 136–145)
SODIUM SERPL-SCNC: 147 MMOL/L (ref 136–145)
SODIUM SERPL-SCNC: 147 MMOL/L (ref 136–145)
WBC # BLD AUTO: 5.7 K/UL (ref 4.3–11.1)
WBC MORPH BLD: ABNORMAL

## 2021-10-05 PROCEDURE — 74011250636 HC RX REV CODE- 250/636: Performed by: EMERGENCY MEDICINE

## 2021-10-05 PROCEDURE — 77030041974 HC CATH SYS PERIPH TELE -B

## 2021-10-05 PROCEDURE — 36415 COLL VENOUS BLD VENIPUNCTURE: CPT

## 2021-10-05 PROCEDURE — 74011250636 HC RX REV CODE- 250/636: Performed by: FAMILY MEDICINE

## 2021-10-05 PROCEDURE — 65610000006 HC RM INTENSIVE CARE

## 2021-10-05 PROCEDURE — 97112 NEUROMUSCULAR REEDUCATION: CPT

## 2021-10-05 PROCEDURE — 74011250636 HC RX REV CODE- 250/636: Performed by: STUDENT IN AN ORGANIZED HEALTH CARE EDUCATION/TRAINING PROGRAM

## 2021-10-05 PROCEDURE — 74011000258 HC RX REV CODE- 258: Performed by: FAMILY MEDICINE

## 2021-10-05 PROCEDURE — 74011000250 HC RX REV CODE- 250: Performed by: HOSPITALIST

## 2021-10-05 PROCEDURE — 84295 ASSAY OF SERUM SODIUM: CPT

## 2021-10-05 PROCEDURE — 99231 SBSQ HOSP IP/OBS SF/LOW 25: CPT | Performed by: NURSE PRACTITIONER

## 2021-10-05 PROCEDURE — 76937 US GUIDE VASCULAR ACCESS: CPT

## 2021-10-05 PROCEDURE — 74011000258 HC RX REV CODE- 258: Performed by: STUDENT IN AN ORGANIZED HEALTH CARE EDUCATION/TRAINING PROGRAM

## 2021-10-05 PROCEDURE — 97165 OT EVAL LOW COMPLEX 30 MIN: CPT

## 2021-10-05 PROCEDURE — 97162 PT EVAL MOD COMPLEX 30 MIN: CPT

## 2021-10-05 PROCEDURE — 74011250637 HC RX REV CODE- 250/637: Performed by: STUDENT IN AN ORGANIZED HEALTH CARE EDUCATION/TRAINING PROGRAM

## 2021-10-05 PROCEDURE — 74011250636 HC RX REV CODE- 250/636: Performed by: HOSPITALIST

## 2021-10-05 PROCEDURE — 74011000250 HC RX REV CODE- 250: Performed by: STUDENT IN AN ORGANIZED HEALTH CARE EDUCATION/TRAINING PROGRAM

## 2021-10-05 PROCEDURE — 70450 CT HEAD/BRAIN W/O DYE: CPT

## 2021-10-05 PROCEDURE — 77030020847 HC STATLOK BARD -A

## 2021-10-05 PROCEDURE — 85025 COMPLETE CBC W/AUTO DIFF WBC: CPT

## 2021-10-05 PROCEDURE — 80048 BASIC METABOLIC PNL TOTAL CA: CPT

## 2021-10-05 RX ORDER — QUETIAPINE FUMARATE 25 MG/1
25 TABLET, FILM COATED ORAL
Status: DISCONTINUED | OUTPATIENT
Start: 2021-10-05 | End: 2021-10-07

## 2021-10-05 RX ORDER — DEXTROSE MONOHYDRATE AND SODIUM CHLORIDE 5; .45 G/100ML; G/100ML
50 INJECTION, SOLUTION INTRAVENOUS CONTINUOUS
Status: DISCONTINUED | OUTPATIENT
Start: 2021-10-05 | End: 2021-10-08

## 2021-10-05 RX ADMIN — LORAZEPAM 1 MG: 2 INJECTION INTRAMUSCULAR; INTRAVENOUS at 02:08

## 2021-10-05 RX ADMIN — HYDRALAZINE HYDROCHLORIDE 15 MG: 20 INJECTION INTRAMUSCULAR; INTRAVENOUS at 06:18

## 2021-10-05 RX ADMIN — DIVALPROEX SODIUM 125 MG: 125 CAPSULE ORAL at 11:45

## 2021-10-05 RX ADMIN — LABETALOL HYDROCHLORIDE 10 MG: 5 INJECTION INTRAVENOUS at 14:55

## 2021-10-05 RX ADMIN — Medication 10 ML: at 13:14

## 2021-10-05 RX ADMIN — LABETALOL HYDROCHLORIDE 10 MG: 5 INJECTION INTRAVENOUS at 23:25

## 2021-10-05 RX ADMIN — LEVETIRACETAM 500 MG: 100 INJECTION, SOLUTION INTRAVENOUS at 17:28

## 2021-10-05 RX ADMIN — LEVETIRACETAM 750 MG: 100 INJECTION, SOLUTION INTRAVENOUS at 06:04

## 2021-10-05 RX ADMIN — QUETIAPINE FUMARATE 25 MG: 25 TABLET ORAL at 22:15

## 2021-10-05 RX ADMIN — HYDRALAZINE HYDROCHLORIDE 15 MG: 20 INJECTION INTRAMUSCULAR; INTRAVENOUS at 12:19

## 2021-10-05 RX ADMIN — Medication 10 ML: at 23:26

## 2021-10-05 RX ADMIN — Medication 10 ML: at 22:00

## 2021-10-05 RX ADMIN — DEXTROSE MONOHYDRATE AND SODIUM CHLORIDE 50 ML/HR: 5; .45 INJECTION, SOLUTION INTRAVENOUS at 14:55

## 2021-10-05 RX ADMIN — CEFTRIAXONE 1 G: 1 INJECTION, POWDER, FOR SOLUTION INTRAMUSCULAR; INTRAVENOUS at 17:17

## 2021-10-05 RX ADMIN — LORAZEPAM 1 MG: 2 INJECTION INTRAMUSCULAR; INTRAVENOUS at 13:13

## 2021-10-05 RX ADMIN — Medication 10 ML: at 06:00

## 2021-10-05 RX ADMIN — LEVOTHYROXINE SODIUM 112 MCG: 0.11 TABLET ORAL at 08:07

## 2021-10-05 NOTE — PROGRESS NOTES
ACUTE PHYSICAL THERAPY GOALS:  (Developed with and agreed upon by patient and/or caregiver.)  ST. Patient will perform bed mobility with MODERATE ASSISTANCE within 3 days. 2. Patient will transfer sit to stand with MODERATE ASSISTANCE  3. Patient will transfer from bed to chair with MAXIMAL ASSISTANCE within 3 days. 4. Patient will demonstrate GOOD STATIC STANDING balance within 3 day(s). 5. Patient will tolerate 15+ minutes of therapeutic activity/exercise and/or neuromuscular re-education while maintaining stable vitals to improve functional strength and activity tolerance within 3 days. LT. Patient will perform bed mobility with MINIMAL ASSISTANCE within 7 days. 2. Patient will transfer bed to chair with MINIMAL ASSISTANCE within 7 days. 3. Patient will demonstrate GOOD DYNAMIC SITTING balance within 7 day(s). 4. Patient will ambulate 50ft+ using least restrictive assistive device and MINIMAL ASSISTANCE within 7 days. 5. Patient will tolerate 25+ minutes of therapeutic activity/exercise and/or neuromuscular re-education while maintaining stable vitals to improve functional strength and activity tolerance within 7 days.       PHYSICAL THERAPY ASSESSMENT: Initial Assessment and PM PT Treatment Day Cherrie Osborne is a 78 y.o. female   PRIMARY DIAGNOSIS: Intracranial bleed (HCC)  Intracranial bleed ([facility] [I62.9]  Dementia ([facility] [F03.90]  Poorly-controlled hypertension [I10]  Hypothyroidism [E03.9]  Encephalopathy, unspecified [G93.40]       Reason for Referral:    ICD-10: Treatment Diagnosis: Difficulty in walking, Not elsewhere classified (R26.2)  Unspecified Lack of Coordination (R27.9)  INPATIENT: Payor: Tonja Garcia / Plan: Damion Guaman / Product Type: [employer] /     ASSESSMENT:     REHAB RECOMMENDATIONS:   Recommendation to date pending progress:  Setting:   TBD pending patient progress  Equipment:    To Be Determined     PRIOR LEVEL OF FUNCTION:  (Prior to Hospitalization) INITIAL/CURRENT LEVEL OF FUNCTION:  (Most Recently Demonstrated)   Bed Mobility:   Unknown  Sit to Stand:   Unknown  Transfers:   Unknown  Gait/Mobility:   Unknown Bed Mobility:   Moderate Assistance x 2  Sit to Stand:   Not tested  Transfers:   Not tested  Gait/Mobility:   Not tested     ASSESSMENT:  Ms. Debbie Caba  is an 78year old female admitted with AMS and found to have right intraparenchymal hemorrhage and SDH. Patient seen this PM in the ICU for initial evaluation. Baseline mobility unknown. Today, oriented to person and place in a field of two. Minimal command following despite multimodal cues (will open eyes and  bilaterally on command); however, functional command following limited. Moves all four extremities spontaneously. Left UE presents with increased flexion compared to right. Mildly impaired attention appreciated to left visual/spatial field. Grossly moderate assistance for bed mobility. (See detailed assessment below.) Formal functional assessment limited by patient agitation. Will follow for trail basis of PT with stated plan of care. SUBJECTIVE:   Ms. Debbie Caba states, \"Michi. \"    SOCIAL HISTORY/LIVING ENVIRONMENT: Unknown baseline mobility. Support Systems: Spouse/Significant Other Mac Burks [phone] (spouse))  OBJECTIVE:     PAIN: VITAL SIGNS: LINES/DRAINS:   Pre Treatment: Pain Screen  Pain Scale 1: Numeric (0 - 10)  Pain Intensity 1: 0  Post Treatment: 0/10   Reynaga Catheter, IV and ICU Monitors  O2 Device: None (Room air)     GROSS EVALUATION:   Within Functional Limits Abnormal/ Functional Abnormal/ Non-Functional (see comments) Not Tested Comments:   AROM [] [x] [] [] Spontaneously of B LEs WFL; L UE presents with flexion   PROM [] [x] [] [] L UE with mild flexion tone appreciated   Strength [] [x] [] [] Unable to formally assess; however patient able to move B LE against gravity within full ROM.    Balance [] [x] [] [] Impaired dynamically in long sitting   Posture [x] [] [] [] Mild cervical extension preference appreciated   Sensation [] [x] [] [] Withdrawals to noxious stimuli and pain B LE; greater withdrawal appreciated on R LE; unable to assess light touch   Coordination [] [x] [] [] B UE/LE functionally   Tone [] [x] [] [] L UE flexion tone 1+ on Modifed Tricia Scale   Edema [] [] [] [x]    Activity Tolerance [] [x] [] [] Subjectively patient reports feeling \"awful\"    [] [] [] []      COGNITION/  PERCEPTION: Intact Impaired   (see comments) Comments:   Orientation [] [x] Oriented to person and place (in a field of two); agitated and resistive to answer additional orientation questions when asked   Vision [] [x] Eye closed bilateral preference; when open upward and mild right deviation with left visual field inattention appreciated   Hearing [x] [] Appears [facility]   Command Following [] [x] Limited to eyes open and gripping on command with multimodal cues   Safety Awareness [] [x] Requiring restraints for safety today    [] []      MOBILITY: I Mod I S SBA CGA Min Mod Max Total  NT x2 Comments:   Bed Mobility    Rolling [] [] [] [] [] [] [] [] [] [x] []    Supine to Sit [] [] [] [] [] [] [x] [] [] [] [x] Via bed in partial chair position and long sitting   Scooting [] [] [] [] [] [] [] [] [x] [] [x] Up in bed   Sit to Supine [] [] [] [] [] [x] [] [] [] [] [x] Able to eccentrically control trunk   Transfers    Sit to Stand [] [] [] [] [] [] [] [] [] [x] [] EOB and transfer activity deferred secondary to patient's level of agitation    Bed to Chair [] [] [] [] [] [] [] [] [] [x] []    Stand to Sit [] [] [] [] [] [] [] [] [] [x] []    I=Independent, Mod I=Modified Independent, S=Supervision, SBA=Standby Assistance, CGA=Contact Guard Assistance,   Min=Minimal Assistance, Mod=Moderate Assistance, Max=Maximal Assistance, Total=Total Assistance, NT=Not Tested  GAIT: I Mod I S SBA CGA Min Mod Max Total  NT x2 Comments:   Level of Assistance [] [] [] [] [] [] [] [] [] [x] []    Distance N/A    DME N/A    Gait Quality N/A    Weightbearing Status N/A     I=Independent, Mod I=Modified Independent, S=Supervision, SBA=Standby Assistance, CGA=Contact Guard Assistance,   Min=Minimal Assistance, Mod=Moderate Assistance, Max=Maximal Assistance, Total=Total Assistance, NT=Not Tested    [address] [place] [id number] AM-PAC [facility] Form       How much difficulty does the patient currently have. .. Unable A Lot A Little None   1. Turning over in bed (including adjusting bedclothes, sheets and blankets)? [] 1   [x] 2   [] 3   [] 4   2. Sitting down on and standing up from a chair with arms ( e.g., wheelchair, bedside commode, etc.)   [x] 1   [] 2   [] 3   [] 4   3. Moving from lying on back to sitting on the side of the bed? [] 1   [x] 2   [] 3   [] 4   How much help from another person does the patient currently need. .. Total A Lot A Little None   4. Moving to and from a bed to a chair (including a wheelchair)? [x] 1   [] 2   [] 3   [] 4   5. Need to walk in hospital room? [x] 1   [] 2   [] 3   [] 4   6. Climbing 3-5 steps with a railing? [x] 1   [] 2   [] 3   [] 4   © 2007, Trustees of [address] [place] [id number], under license to [employer]. All rights reserved     Score:  Initial: 9 Most Recent: X (Date: -- )    Interpretation of Tool:  Represents activities that are increasingly more difficult (i.e. Bed mobility, Transfers, Gait). PLAN:   FREQUENCY/DURATION: PT Plan of Care: 2 times/week (Trial) for duration of hospital stay or until stated goals are met, whichever comes first.    PROBLEM LIST:   (Skilled intervention is medically necessary to address:)  1. Decreased ADL/Functional Activities  2. Decreased Activity Tolerance  3. Decreased AROM/PROM  4. Decreased Balance  5. Decreased Cognition  6. Decreased Coordination  7. Decreased Gait Ability  8.  Decreased Transfer Abilities   INTERVENTIONS PLANNED:   (Benefits and precautions of physical therapy have been discussed with the patient.)  1. Therapeutic Activity  2. Therapeutic Exercise/HEP  3. Neuromuscular Re-education  4. Gait Training  5. Manual Therapy  6.  Education     TREATMENT:     EVALUATION: Moderate Complexity : (Untimed Charge)    TREATMENT:   (     )  Evaluation only; no treatment this date    TREATMENT GRID:  N/A    AFTER TREATMENT POSITION/PRECAUTIONS:  Alarm Activated, Bed, Needs within reach, Restraints , RN notified and bilateral wrist restraints as well as bilaterally mitts reapplied; RN attending at bedside    INTERDISCIPLINARY COLLABORATION:  RN/PCT, PT/PTA and OT/ALMEIDA    TOTAL TREATMENT DURATION:  PT Patient Time In/Time Out  Time In: 1302  Time Out: [address], T

## 2021-10-05 NOTE — PROGRESS NOTES
[facility]    Assessment: right temporal IPH and SDH    Plan  :- Recommend Head of Bed to 30 degrees  - Recommend holding anti-platelet and anti-coagulant medications   - Recommend Keppra 500 mg BID for seizure prophylaxis   - No acute neurosurgical intervention necessary at this time.   - Will continue to follow along   - Hemorrhage likely secondary amyloid angiopathy    Subjective:    Objective:  Afebrile  VSS  Confused and oriented to self only  Moves all extremities but does not follow commands      Patient Vitals for the past 12 hrs:   Temp Pulse Resp BP SpO2   10/05/21 0802 98.9 °F (37.2 °C) 97 25 (!) 158/66 95 %   10/05/21 0730     90 %   10/05/21 0702  (!) 103 22 (!) 143/72 96 %   10/05/21 0630  94 23  95 %   10/05/21 0615  73 23  96 %   10/05/21 0600  78 25  96 %   10/05/21 0545  76 23  97 %   10/05/21 0530  70 26  98 %   10/05/21 0515  67 22  99 %   10/05/21 0500  67 17  100 %   10/05/21 0445  (!) 58 17  99 %   10/05/21 0430  (!) 58 15  100 %   10/05/21 0415  (!) 58 25  100 %   10/05/21 0400  (!) 57 18  98 %   10/05/21 0345  61 18  100 %   10/05/21 0330  60 17  97 %   10/05/21 0315  61 18  96 %   10/05/21 0300 98.5 °F (36.9 °C) 61 18 (!) 114/48 95 %   10/05/21 0245  64 19  96 %   10/05/21 0240  65 16 (!) 129/59 97 %   10/05/21 0230  62 15  97 %   10/05/21 0215  69 (!) 39  98 %   10/05/21 0145  79 (!) 31  98 %   10/05/21 0130  78 (!) 32  98 %   10/05/21 0115  75 (!) 37  98 %   10/05/21 0102  70 23 (!) 133/57 99 %   10/05/21 0100  60 18  98 %   10/05/21 0045  64 19  99 %   10/05/21 0030  63 16  99 %   10/05/21 0015  62 16  99 %   10/05/21 0000  61 17  99 %   10/04/21 2345  77 (!) 43  99 %   10/04/21 2341  75 (!) 38 138/61 98 %   10/04/21 2330  61 18  99 %   10/04/21 2315  61 28  98 %   10/04/21 2300 98.6 °F (37 °C) 67 17 138/61 100 %   10/04/21 2245  63 17  98 %   10/04/21 2230  63 19  98 %        Recent Results (from the past 24 hour(s))   SODIUM    Collection Time: 10/04/21 12:59 PM   Result Value Ref Range    Sodium 146 (H) 136 - 145 mmol/L   URINALYSIS W/ RFLX MICROSCOPIC    Collection Time: 10/04/21  3:11 PM   Result Value Ref Range    Color YELLOW      Appearance TURBID      Specific gravity 1.025 (H) 1.001 - 1.023      pH (UA) 5.5 5.0 - 9.0      Protein 100 (A) NEG mg/dL    Glucose Negative mg/dL    Ketone 15 (A) NEG mg/dL    Bilirubin Negative NEG      Blood MODERATE (A) NEG      Urobilinogen 0.2 0.2 - 1.0 EU/dL    Nitrites Negative NEG      Leukocyte Esterase Negative NEG      WBC 10-20 0 /hpf    RBC 10-20 0 /hpf    Epithelial cells 0-3 0 /hpf    Bacteria 4+ (H) 0 /hpf    Casts 0-3 0 /lpf   SODIUM    Collection Time: 10/04/21  6:50 PM   Result Value Ref Range    Sodium 146 (H) 136 - 145 mmol/L   CULTURE, URINE    Collection Time: 10/04/21  9:07 PM    Specimen: Cath Urine    RODRIGUEZ SPECIMEN   Result Value Ref Range    Special Requests: NO SPECIAL REQUESTS      Culture result:        NO GROWTH AFTER SHORT PERIOD OF INCUBATION. FURTHER RESULTS TO FOLLOW AFTER OVERNIGHT INCUBATION.    SODIUM    Collection Time: 10/05/21 12:40 AM   Result Value Ref Range    Sodium 147 (H) 136 - 145 mmol/L        Liberty Purvis NP

## 2021-10-05 NOTE — PROGRESS NOTES
Hospitalist Progress Note   Admit Date:  10/3/2021  2:16 AM   Name:  Adele Bedoya   Age:  78 y.o. Sex:  female  :  1940   MRN:  [id number]   Room:  [facility]/    Presenting Complaint: Headache    Reason(s) for Admission: Intracranial bleed ([facility] [I62.9]  Dementia ([facility] [F03.90]  Poorly-controlled hypertension [I10]  Hypothyroidism [E03.9]  Encephalopathy, unspecified [G93.40]     Hospital Course & Interval History:   Adele Bedoya is a 78 y.o. female with medical history of hypertension, dementia, hypothyroidism presented to emergency room with headache, worsening of confusion. Patient was complaining headache to family about right side which continued to get worse over the right temporal area. As per family patient is getting more agitated, confused but no history of slurring in speech, patient was brought to emergency room. Patient was evaluated, CT head shows evidence of right temporal parenchymal hemorrhage around 2 cm. 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 Patient continues to suffer from agitation and altered mental status in the setting of underlying dementia and acute right-sided temporal parenchymal hemorrhage with vasogenic edema and subdural hemorrhage. Patient had Code S called overnight 10/3 for severe headache associated with nausea and dry heaving. Patient was started on mannitol and Cardene to reduce intracranial pressure. Stat CT head showed no new changes. Patient remains agitated/combative requiring restraints and medication. Subjective (10/05/21):  Patient seen and examined at bedside this morning. No acute events overnight. Patient significantly more sedated and drowsy this morning on my examination.   Seen by speech therapy and patient unable to sustain alertness to participate in feeding trials. Patient was started on Seroquel and Depakote yesterday due to severe agitation and combativeness. Discussed with nursing who will hold this morning's Seroquel. CT head yesterday stable. Will order repeat CT head today. Patient able to tell me her name but unable to respond any other questions. She is moving all extremities. Assessment & Plan:     Principal Problem:   #Intracranial bleed     - CT head overnight 10/3 unchanged from MRI showing R temporal lobe IPH with no midline shift or hydrocephalus  - CTA showing aneurysm but not in territory of IPH  - cont Keppra 500mg BID  - NSG following  - s/p Mannitol and started on 3% saline overnight 10/3- goal Na 147 and 3% saline stopped.     - repeat CT head today pending  - keep BP<140/90 with cardene gtt  - PT/OT/ST    Active Problems:    #Hypothyroidism   - cont home synthroid     #Metabolic Encephalopathy  - worsened today, multifactorial in setting of underlying dementia, ICU delirium, ICH and overmedication   - change to seroquel qhs and hold depakote BID  - s/p low dose ativan and zyprexa yesterday for major agitation   - cont to reorient frequently  - ordered sitter at bedside  - restraints prn for unsafe mobile attempts  - UA showing possible UTI with more likely chronic colonization - will start Iv rocephin in case UTI contributing to mental status  - SLP saw patient today and recommending NPO due to worsening AMS, will start D5W-1/2NS while NPO       #Dementia   - complicating current condition  - reorient frequently      Poorly-controlled hypertension   - cardene gtt for goal BP <140/90      Dispo/Discharge Planning:      PT/OT ordered    Diet:  DIET NPO  DVT PPx: SCDs only in setting of bleed  Code status: Full Code    Hospital Problems as of 10/5/2021 Never Reviewed        Codes Class Noted - Resolved POA    Encephalopathy, unspecified ICD-10-CM: G93.40  ICD-9-CM: 348.30  10/3/2021 - Present Unknown        Dementia ([facility] ICD-10-CM: F03.90  ICD-9-CM: 294.20  10/3/2021 - Present Unknown        * (Principal) Intracranial bleed ([facility] ICD-10-CM: I62.9  ICD-9-CM: 432.9  10/3/2021 - Present Unknown        Poorly-controlled hypertension ICD-10-CM: I10  ICD-9-CM: 401.9  10/3/2021 - Present Unknown        Hypothyroidism ICD-10-CM: E03.9  ICD-9-CM: 244.9  6/29/2012 - Present Unknown              Objective:     Patient Vitals for the past 24 hrs:   Temp Pulse Resp BP SpO2   10/05/21 1202 99 °F (37.2 °C) 92 22 (!) 155/74 95 %   10/05/21 1102  67 28 (!) 151/70 99 %   10/05/21 0802 98.9 °F (37.2 °C) 97 25 (!) 158/66 95 %   10/05/21 0730     90 %   10/05/21 0702  (!) 103 22 (!) 143/72 96 %   10/05/21 0630  94 23  95 %   10/05/21 0615  73 23  96 %   10/05/21 0600  78 25  96 %   10/05/21 0545  76 23  97 %   10/05/21 0530  70 26  98 %   10/05/21 0515  67 22  99 %   10/05/21 0500  67 17  100 %   10/05/21 0445  (!) 58 17  99 %   10/05/21 0430  (!) 58 15  100 %   10/05/21 0415  (!) 58 25  100 %   10/05/21 0400  (!) 57 18  98 %   10/05/21 0345  61 18  100 %   10/05/21 0330  60 17  97 %   10/05/21 0315  61 18  96 %   10/05/21 0300 98.5 °F (36.9 °C) 61 18 (!) 114/48 95 %   10/05/21 0245  64 19  96 %   10/05/21 0240  65 16 (!) 129/59 97 %   10/05/21 0230  62 15  97 %   10/05/21 0215  69 (!) 39  98 %   10/05/21 0145  79 (!) 31  98 %   10/05/21 0130  78 (!) 32  98 %   10/05/21 0115  75 (!) 37  98 %   10/05/21 0102  70 23 (!) 133/57 99 %   10/05/21 0100  60 18  98 %   10/05/21 0045  64 19  99 %   10/05/21 0030  63 16  99 %   10/05/21 0015  62 16  99 %   10/05/21 0000  61 17  99 %   10/04/21 2345  77 (!) 43  99 %   10/04/21 2341  75 (!) 38 138/61 98 %   10/04/21 2330  61 18  99 %   10/04/21 2315  61 28  98 %   10/04/21 2300 98.6 °F (37 °C) 67 17 138/61 100 %   10/04/21 2245  63 17  98 %   10/04/21 2230  63 19  98 %   10/04/21 2215  65 17  98 %   10/04/21 2200  77 (!) 42  97 %   10/04/21 2145  81 27  96 %   10/04/21 2130  75 27  97 %   10/04/21 2115  75 28  98 %   10/04/21 2100  74 (!) 31  98 %   10/04/21 2045  71 18  97 %   10/04/21 2030  64 17  98 %   10/04/21 2015  68 17  98 %   10/04/21 2001  68 16 (!) 135/59 98 %   10/04/21 2000  65 15  98 %   10/04/21 1945  68 17  98 %   10/04/21 1930  75 28  98 %   10/04/21 1915  80 (!) 32  97 %   10/04/21 1900 98 °F (36.7 °C) 73 16 (!) 138/58 96 %   10/04/21 1800    136/66    10/04/21 1702  91 (!) 37 (!) 134/57 97 %   10/04/21 1619  62 29 (!) 149/56 100 %   10/04/21 1618 98.2 °F (36.8 °C)       10/04/21 1429  77 (!) 35 (!) 141/74 97 %   10/04/21 1302  70 21 (!) 143/63 98 %     Oxygen Therapy  O2 Sat (%): 95 % (10/05/21 1202)  Pulse via Oximetry: 92 beats per minute (10/05/21 1202)  O2 Device: None (Room air) (10/05/21 0730)  Skin Assessment: Clean, dry, & intact (10/03/21 2001)  Skin Protection for O2 Device: N/A (10/03/21 2001)  O2 Flow Rate (L/min): 2 l/min (10/04/21 1900)    Estimated body mass index is 26.52 kg/m² as calculated from the following:    Height as of this encounter: 5' 5\" (1.651 m). Weight as of this encounter: 72.3 kg (159 lb 6.3 oz). Intake/Output Summary (Last 24 hours) at 10/5/2021 1241  Last data filed at 10/5/2021 1159  Gross per 24 hour   Intake 118.33 ml   Output 675 ml   Net -556.67 ml         Physical Exam:   General:    Well nourished. Agitated, combative, drowsy and sedated  Head:  Normocephalic, atraumatic  Eyes:  Sclerae appear normal.  PERRL.  ENT:  Nares appear normal, no drainage. Moist oral mucosa  Neck:  No restricted ROM. Trachea midline   CV:   RRR. No m/r/g. No jugular venous distension. Lungs:   CTAB. No wheezing, rhonchi, or rales. Respirations even, unlabored  Abdomen: Bowel sounds present. Soft, nontender, nondistended. Extremities: No cyanosis or clubbing. No edema  Skin:     No rashes and normal coloration. Warm and dry. Neuro:  Cranial nerves II-XII grossly intact. Moving all extremities. Refusing to answer orientation questions. EOMi  Psych:  Anxious, agitated, drowsy     I have reviewed ordered lab tests and independently visualized imaging below:    Last 24hr Labs:  Recent Results (from the past 24 hour(s))   SODIUM    Collection Time: 10/04/21 12:59 PM   Result Value Ref Range    Sodium 146 (H) 136 - 145 mmol/L   URINALYSIS W/ RFLX MICROSCOPIC    Collection Time: 10/04/21  3:11 PM   Result Value Ref Range    Color YELLOW      Appearance TURBID      Specific gravity 1.025 (H) 1.001 - 1.023      pH (UA) 5.5 5.0 - 9.0      Protein 100 (A) NEG mg/dL    Glucose Negative mg/dL    Ketone 15 (A) NEG mg/dL    Bilirubin Negative NEG      Blood MODERATE (A) NEG      Urobilinogen 0.2 0.2 - 1.0 EU/dL    Nitrites Negative NEG      Leukocyte Esterase Negative NEG      WBC 10-20 0 /hpf    RBC 10-20 0 /hpf    Epithelial cells 0-3 0 /hpf    Bacteria 4+ (H) 0 /hpf    Casts 0-3 0 /lpf   SODIUM    Collection Time: 10/04/21  6:50 PM   Result Value Ref Range    Sodium 146 (H) 136 - 145 mmol/L   CULTURE, URINE    Collection Time: 10/04/21  9:07 PM    Specimen: Cath Urine    RODRIGUEZ SPECIMEN   Result Value Ref Range    Special Requests: NO SPECIAL REQUESTS      Culture result:        NO GROWTH AFTER SHORT PERIOD OF INCUBATION. FURTHER RESULTS TO FOLLOW AFTER OVERNIGHT INCUBATION. SODIUM    Collection Time: 10/05/21 12:40 AM   Result Value Ref Range    Sodium 147 (H) 136 - 145 mmol/L       All Micro Results     Procedure Component Value Units Date/Time    CULTURE, URINE [[id number]] Collected: 10/04/21 2107    Order Status: Completed Specimen: Cath Urine Updated: 10/05/21 0644     Special Requests: NO SPECIAL REQUESTS        Culture result:       NO GROWTH AFTER SHORT PERIOD OF INCUBATION. FURTHER RESULTS TO FOLLOW AFTER OVERNIGHT INCUBATION.                 Other Studies:  CT HEAD WO CONT    Result Date: 10/4/2021  EXAMINATION: HEAD CT WITHOUT CONTRAST 10/4/2021 1:27 PM ACCESSION NUMBER: [id number] INDICATION: IPH COMPARISON: CT head, 10/3/2021 TECHNIQUE: Multiple-row detector helical CT examination of the head without intravenous contrast. Radiation dose reduction techniques were used for this study:  Our CT scanners use one or all of the following: Automated exposure control, adjustment of the mA and/or kVp according to patient's size, iterative reconstruction. FINDINGS: Redemonstration of a 2.9 cm hyperdense image parenchymal hematoma in the right temporal lobe which is unchanged in size but with slight increase in size of surrounding vasogenic edema. The degree of localized mass effect does not appear to have significantly progressed. No new intracranial hemorrhage. No midline shift. Stable size and configuration of the ventricular system with mild ex vacuo ventriculomegaly from underlying generalized volume loss. There are bilateral periventricular and deep white matter hypodensities are unchanged most compatible with chronic microvascular ischemic changes. No definite large territory ischemic infarct. Orbits and globes are within normal limits. No extracranial soft tissue abnormality. Paranasal sinuses, mastoid air cells, and middle ears are well aerated. No acute or aggressive osseous abnormality. Grossly stable size of the right temporal lobe intraparenchymal hemorrhage with slight progression in surrounding vasogenic edema. The degree of localized mass effect is grossly unchanged. No midline shift or hydrocephalus.        Current Meds:  Current Facility-Administered Medications   Medication Dose Route Frequency    levETIRAcetam (KEPPRA) 500 mg in 0.9% sodium chloride (MBP/ADV) 100 mL MBP  500 mg IntraVENous Q12H    QUEtiapine (SEROquel) tablet 25 mg  25 mg Oral QHS    [Held by provider] divalproex (DEPAKOTE SPRINKLE) capsule 125 mg  125 mg Oral Q12H    levothyroxine (SYNTHROID) tablet 112 mcg  112 mcg Oral ACB    LORazepam (ATIVAN) injection 1 mg  1 mg IntraVENous Q4H PRN    sodium chloride (NS) flush 5-40 mL  5-40 mL IntraVENous Q8H    sodium chloride (NS) flush 5-40 mL  5-40 mL IntraVENous PRN    labetaloL (NORMODYNE;TRANDATE) injection 10 mg  10 mg IntraVENous Q4H PRN    hydrALAZINE (APRESOLINE) 20 mg/mL injection 15 mg  15 mg IntraVENous Q6H PRN    0.9% sodium chloride infusion  50 mL/hr IntraVENous CONTINUOUS    metoclopramide HCl (REGLAN) injection 5 mg  5 mg IntraVENous Q6H PRN    [Held by provider] amLODIPine (NORVASC) tablet 5 mg  5 mg Oral DAILY    niCARdipine (CARDENE) 25 mg in 0.9% sodium chloride (MBP/ADV) 250 mL infusion  0-15 mg/hr IntraVENous TITRATE    ondansetron (ZOFRAN) injection 8 mg  8 mg IntraVENous Q8H PRN       Signed:  Audrey Alva MD    Part of this note may have been written by using a voice dictation software. The note has been proof read but may still contain some grammatical/other typographical errors.

## 2021-10-05 NOTE — PROGRESS NOTES
SPEECH PATHOLOGY NOTE:    Attempted to see patient for diet tolerance this AM; however, patient unable to sustain alertness to participate. RN reports patient does not appear safe with po intake even when alert due to AMS and agitation. Recommend NPO except critical meds crushed in applesauce (when alert/upright) until patient can participate in re-evaluation with speech therapy. Consider non-oral meds for now due to altered mentation and fluctuating alertness. Notified hospitalist of recommendations via perfect serve. Questionable left sided facial droop noted. RN notified. Mary Lanza MS, CCC-SLP           ADDENDUM:    Per chart review, patient off floor for repeat CT of head. Will follow up at later time/date.         Mary Lanza MS, CCC-SLP

## 2021-10-05 NOTE — PROGRESS NOTES
Pt returned from radiology repositioned in bed for comfort. Restraints remain due pt being agitated and confusion and inability to follow commands.

## 2021-10-05 NOTE — PROGRESS NOTES
Pt skin has potential to tear. Pt places legs in between side rails. Optifoam applied to low shin area to prevent skin tears. Skin on elbows are fragile Elbows are not broken down Optifoam applied to prevent skin breakdown. Skin tear noted to top of left hand neosporin applied skin tear and optifoam applied to protect hand.

## 2021-10-05 NOTE — ACP (ADVANCE CARE PLANNING)
Advance Care Planning     General Advance Care Planning (ACP) Conversation      Date of Conversation: 10/3/2021  Conducted with: Patient confused    Healthcare Decision Maker:     Primary Decision Maker: Park Ramirez - Spouse - [phone]    Secondary Decision Maker: Breezy Selby - Daughter - [phone]  Click here to complete [address] including selection of the Healthcare Decision Maker Relationship (ie \"Primary\")          Content/Action Overview:   Patient is confused  Reviewed DNR/DNI and patient elects Full Code (Attempt Resuscitation)         Length of Voluntary ACP Conversation in minutes:  <16 minutes (Non-Billable)    Igor Curtis RN

## 2021-10-05 NOTE — PROGRESS NOTES
IV Access    Called for PIV request. Placed 18g 8cm Endurance EDC in left upper arm with ultrasound assistance x1 attempt [id number]. Blood return noted and PIV flushed without difficulty. Pt nabila well. Instructed primary nurse that she may draw blood from the extended dwell catheter by using a 10ml syringe, draw a 3 ml waste. Then draw blood for labs and flush line with 10 ml of NS. [address] [facility]  Calin Charlton RN, PCCN, VAT

## 2021-10-05 NOTE — ROUTINE PROCESS
Bedside report given to Rosamaria Lee RN oncoming nurse. Opportunity for questions, concerns. Patient involved.

## 2021-10-05 NOTE — PROGRESS NOTES
Emergency Contact Janet Wiggins [phone] (spouse)    Chart screened by  for discharge planning. Patient has been agitated, confused and inability to cooperate and not appropriate to work with PT and OT at this time. Will continue to follow for discharge planning needs  Please consult  if any new issues arise    Discharge plan is undetermined at this time    Care Management Interventions  PCP Verified by CM: Yes (Sandy Barroso *)  Mode of Transport at Discharge:  Other (see comment)  Transition of Care Consult (CM Consult): Discharge Planning  Discharge Durable Medical Equipment: No  Physical Therapy Consult: Yes  Occupational Therapy Consult: Yes  Speech Therapy Consult: Yes  Support Systems: Spouse/Significant Other Janet Wiggins [phone] (spouse))  Confirm Follow Up Transport: Family  The Plan for Transition of Care is Related to the Following Treatment Goals : ongoing therapy needs  The Patient and/or Patient Representative was Provided with a Choice of Provider and Agrees with the Discharge Plan?: Yes  Freedom of Choice List was Provided with Basic Dialogue that Supports the Patient's Individualized Plan of Care/Goals, Treatment Preferences and Shares the Quality Data Associated with the Providers?: Yes   Resource Information Provided?: No  Discharge Location  Discharge Placement: Unable to determine at this time

## 2021-10-05 NOTE — PROGRESS NOTES
ACUTE OT GOALS:  (Developed with and agreed upon by patient and/or caregiver.)  1. Patient will follow 75% of one step verbal or visual commands to increase participation during ADL performance. 2. Patient will tolerate 15 minutes static sitting balance unsupported with CGA while completing functional activity. 3. Patient will tolerate 25  minutes of OT treatment with 2-3 rest breaks to increase activity tolerance for ADLs. 4. Patient will complete functional transfers with CGA and adaptive equipment as needed. 5. Patient will complete upper body bathing and dressing with SBA and adaptive equipment as needed. 6. Patient will complete self-grooming with SBA and adaptive equipment as needed. 7. Patient will tolerate 30 minutes BUE therapeutic exercises to increase functional use during ADL performance.     Timeframe: 7 visits       OCCUPATIONAL THERAPY ASSESSMENT: Initial Assessment and Daily Note OT Treatment Day # 1    Karissa Brochure is a 78 y.o. female   PRIMARY DIAGNOSIS: Intracranial bleed (HCC)  Intracranial bleed ([facility] [I62.9]  Dementia ([facility] [F03.90]  Poorly-controlled hypertension [I10]  Hypothyroidism [E03.9]  Encephalopathy, unspecified [G93.40]       Reason for Referral:   ICD-10: Treatment Diagnosis: Generalized Muscle Weakness (M62.81)  INPATIENT: Payor: Andry Recio / Plan: León Yoo / Product Type: Managed Care Medicare /   ASSESSMENT:     REHAB RECOMMENDATIONS:   Recommendation to date pending progress:  [address] [place] [address]  Equipment:    To Be Determined     PRIOR LEVEL OF FUNCTION:  (Prior to Hospitalization)  INITIAL/CURRENT LEVEL OF FUNCTION:  (Based on today's evaluation)   Bathing:   Unknown  Dressing:   Unknown  Feeding/Grooming:   Unknown  Toileting:   Unknown  Functional Mobility:   Unknown Bathing:   Maximal Assistance  Dressing:   Maximal Assistance  Feeding/Grooming:   Maximal Assistance  Toileting:   Total Assistance  Functional Mobility:   Not tested     ASSESSMENT:  Ms. Talon Hobson presents with deficits in overall strength, activity tolerance, ADL performance and functional mobility. Admitted for intracranial bleed; currently no plans for surgical intervention per neurosurgery. HOB > 30 degrees per MD. Currently in ICU; restless and agitated in 2pt restraints with soft mitts in place. Pt interactive with therapists however confused with poor command following. Moving around in bed but currently unsafe to transfer out of bed. Moving BUEs/BLEs well but hard to assess with formal screen. Will  patient on trial basis pending improvement with pt's cognition/mentation. At this time, Stalin Lentz is functioning below baseline for ADLs and functional mobility. Pt would benefit from skilled OT services to address OT goals and and plan of care. .     SUBJECTIVE:   Ms. Talon Hobson states, \"Awful . \" (In reference to when I ask her how she feels).     SOCIAL HISTORY/LIVING ENVIRONMENT: Unknown  Support Systems: Spouse/Significant Other Allie Mercado [phone] (spouse))    OBJECTIVE:     PAIN: VITAL SIGNS: LINES/DRAINS:   Pre Treatment: Pain Screen  Pain Scale 1: Numeric (0 - 10)  Pain Intensity 1: 0  Post Treatment: 0      O2 Device: None (Room air)     GROSS EVALUATION:  BUEs Within Functional Limits Abnormal/ Functional Abnormal/ Non-Functional (see comments) Not Tested Comments:   AROM [] [x] [] [] Unable to formally assess   PROM [] [x] [] [] Unable to formally assess   Strength [] [x] [] [] Unable to formally assess   Balance [] [] [] [x]    Posture [] [] [] [x]    Sensation [] [] [] [x]    Coordination [] [] [] [x]    Tone [] [x] [] []    Edema [] [] [] []    Activity Tolerance [] [x] [] []     [] [] [] []      COGNITION/  PERCEPTION: Intact Impaired   (see comments) Comments:   Orientation [] [x]    Vision [] [] Unable to assess   Hearing [] [x]    Judgment/ Insight [] [x]    Attention [] [x]    Memory [] [x]    Command Following [] [x] Emotional Regulation [] [x]     [] []      ACTIVITIES OF DAILY LIVING: I Mod I S SBA CGA Min Mod Max Total NT Comments   BASIC ADLs:              Bathing/ Showering [] [] [] [] [] [] [] [] [] [x]    Toileting [] [] [] [] [] [] [] [] [] [x]    Dressing [] [] [] [] [] [] [] [] [] [x]    Feeding [] [] [] [] [] [] [] [] [] [x]    Grooming [] [] [] [] [] [] [] [] [] [x]    Personal Device Care [] [] [] [] [] [] [] [] [] [x]    Functional Mobility [] [] [] [] [] [] [] [] [] [x]    I=Independent, Mod I=Modified Independent, S=Supervision, SBA=Standby Assistance, CGA=Contact Guard Assistance,   Min=Minimal Assistance, Mod=Moderate Assistance, Max=Maximal Assistance, Total=Total Assistance, NT=Not Tested    MOBILITY: I Mod I S SBA CGA Min Mod Max Total  NT x2 Comments:   Supine to sit [] [] [] [] [] [x] [] [] [] [] [] Sit up in bed   Sit to supine [] [] [] [] [] [] [] [] [] [x] []    Sit to stand [] [] [] [] [] [] [] [] [] [x] []    Bed to chair [] [] [] [] [] [] [] [] [] [x] []    I=Independent, Mod I=Modified Independent, S=Supervision, SBA=Standby Assistance, CGA=Contact Guard Assistance,   Min=Minimal Assistance, Mod=Moderate Assistance, Max=Maximal Assistance, Total=Total Assistance, NT=Not Tested    [facility] [address] [address] AM-PAC 6 Clicks   Daily Activity Inpatient Short Form        How much help from another person does the patient currently need. .. Total A Lot A Little None   1. Putting on and taking off regular lower body clothing? [] 1   [x] 2   [] 3   [] 4   2. Bathing (including washing, rinsing, drying)? [] 1   [x] 2   [] 3   [] 4   3. Toileting, which includes using toilet, bedpan or urinal?   [x] 1   [] 2   [] 3   [] 4   4. Putting on and taking off regular upper body clothing? [] 1   [x] 2   [] 3   [] 4   5. Taking care of personal grooming such as brushing teeth? [] 1   [x] 2   [] 3   [] 4   6. Eating meals?    [] 1   [x] 2   [] 3   [] 4   © 2007, Trustees of [address] [place] [address] [address] [address], under license to [employer]. All rights reserved     Score:  Initial: 11 Most Recent: X (Date: -- )   Interpretation of Tool:  Represents activities that are increasingly more difficult (i.e. Bed mobility, Transfers, Gait). PLAN:   FREQUENCY/DURATION: OT Plan of Care: 3 times/week for duration of hospital stay or until stated goals are met, whichever comes first.    PROBLEM LIST:   (Skilled intervention is medically necessary to address:)  1. Decreased ADL/Functional Activities  2. Decreased Activity Tolerance  3. Decreased AROM/PROM  4. Decreased Balance  5. Decreased Cognition  6. Decreased Coordination  7. Decreased Gait Ability  8. Decreased Strength  9. Decreased Transfer Abilities  10. Increased Pain   INTERVENTIONS PLANNED:   (Benefits and precautions of occupational therapy have been discussed with the patient.)  1. Self Care Training  2. Therapeutic Activity  3. Therapeutic Exercise/HEP  4. Neuromuscular Re-education  5. Education     TREATMENT:     EVALUATION: Low Complexity : (Untimed Charge)    TREATMENT:   ( $$ Neuromuscular Re-Education: 8-22 mins   )  Neuromuscular Re-education (10 Minutes): Neuromuscular Re-education included Balance Training, Coordination training, Postural training and Sitting balance training to improve Balance, Coordination and Postural Control.     TREATMENT GRID:  N/A    AFTER TREATMENT POSITION/PRECAUTIONS:  Bed, Needs within reach, Restraints  and RN notified    INTERDISCIPLINARY COLLABORATION:  RN/PCT, PT/PTA and OT/ALMEIDA    TOTAL TREATMENT DURATION:  OT Patient Time In/Time Out  Time In: [address]  Time Out: [address], OT

## 2021-10-05 NOTE — PROGRESS NOTES
Bedside and verbal shift change report received from  Kay [address] (offgoing nurse). Report included the following information SBAR, Kardex, ED Summary, Intake/Output, MAR, Recent Results, Cardiac Rhythm NSR and Alarm Parameters .      Dual skin assessment completed at bedside: n/a (list pertinent skin assessment findings)    Dual verification of gtts completed (name of gtts verified): n/a

## 2021-10-06 ENCOUNTER — APPOINTMENT (OUTPATIENT)
Dept: NON INVASIVE DIAGNOSTICS | Age: 81
DRG: 064 | End: 2021-10-06
Attending: HOSPITALIST
Payer: MEDICARE

## 2021-10-06 LAB
ANION GAP SERPL CALC-SCNC: 6 MMOL/L (ref 7–16)
BASOPHILS # BLD: 0 K/UL (ref 0–0.2)
BASOPHILS NFR BLD: 1 % (ref 0–2)
BUN SERPL-MCNC: 37 MG/DL (ref 8–23)
CALCIUM SERPL-MCNC: 9.2 MG/DL (ref 8.3–10.4)
CHLORIDE SERPL-SCNC: 114 MMOL/L (ref 98–107)
CO2 SERPL-SCNC: 25 MMOL/L (ref 21–32)
CREAT SERPL-MCNC: 1.55 MG/DL (ref 0.6–1)
DIFFERENTIAL METHOD BLD: ABNORMAL
EOSINOPHIL # BLD: 0.1 K/UL (ref 0–0.8)
EOSINOPHIL NFR BLD: 1 % (ref 0.5–7.8)
ERYTHROCYTE [DISTWIDTH] IN BLOOD BY AUTOMATED COUNT: 12.1 % (ref 11.9–14.6)
GLUCOSE SERPL-MCNC: 115 MG/DL (ref 65–100)
HCT VFR BLD AUTO: 32.3 % (ref 35.8–46.3)
HGB BLD-MCNC: 10.4 G/DL (ref 11.7–15.4)
IMM GRANULOCYTES # BLD AUTO: 0 K/UL (ref 0–0.5)
IMM GRANULOCYTES NFR BLD AUTO: 0 % (ref 0–5)
LYMPHOCYTES # BLD: 0.9 K/UL (ref 0.5–4.6)
LYMPHOCYTES NFR BLD: 15 % (ref 13–44)
MCH RBC QN AUTO: 31.9 PG (ref 26.1–32.9)
MCHC RBC AUTO-ENTMCNC: 32.2 G/DL (ref 31.4–35)
MCV RBC AUTO: 99.1 FL (ref 79.6–97.8)
MONOCYTES # BLD: 0.9 K/UL (ref 0.1–1.3)
MONOCYTES NFR BLD: 13 % (ref 4–12)
NEUTS SEG # BLD: 4.4 K/UL (ref 1.7–8.2)
NEUTS SEG NFR BLD: 70 % (ref 43–78)
NRBC # BLD: 0 K/UL (ref 0–0.2)
PLATELET # BLD AUTO: 170 K/UL (ref 150–450)
PMV BLD AUTO: 11.3 FL (ref 9.4–12.3)
POTASSIUM SERPL-SCNC: 4.1 MMOL/L (ref 3.5–5.1)
RBC # BLD AUTO: 3.26 M/UL (ref 4.05–5.2)
SODIUM SERPL-SCNC: 145 MMOL/L (ref 136–145)
WBC # BLD AUTO: 6.3 K/UL (ref 4.3–11.1)

## 2021-10-06 PROCEDURE — 74011250636 HC RX REV CODE- 250/636: Performed by: STUDENT IN AN ORGANIZED HEALTH CARE EDUCATION/TRAINING PROGRAM

## 2021-10-06 PROCEDURE — 80048 BASIC METABOLIC PNL TOTAL CA: CPT

## 2021-10-06 PROCEDURE — 74011250636 HC RX REV CODE- 250/636: Performed by: HOSPITALIST

## 2021-10-06 PROCEDURE — 74011000250 HC RX REV CODE- 250: Performed by: STUDENT IN AN ORGANIZED HEALTH CARE EDUCATION/TRAINING PROGRAM

## 2021-10-06 PROCEDURE — 85025 COMPLETE CBC W/AUTO DIFF WBC: CPT

## 2021-10-06 PROCEDURE — 74011250637 HC RX REV CODE- 250/637: Performed by: STUDENT IN AN ORGANIZED HEALTH CARE EDUCATION/TRAINING PROGRAM

## 2021-10-06 PROCEDURE — 51798 US URINE CAPACITY MEASURE: CPT

## 2021-10-06 PROCEDURE — 74011000258 HC RX REV CODE- 258: Performed by: STUDENT IN AN ORGANIZED HEALTH CARE EDUCATION/TRAINING PROGRAM

## 2021-10-06 PROCEDURE — 74011250636 HC RX REV CODE- 250/636: Performed by: EMERGENCY MEDICINE

## 2021-10-06 PROCEDURE — 65610000006 HC RM INTENSIVE CARE

## 2021-10-06 RX ADMIN — HYDRALAZINE HYDROCHLORIDE 15 MG: 20 INJECTION INTRAMUSCULAR; INTRAVENOUS at 20:48

## 2021-10-06 RX ADMIN — LEVETIRACETAM 500 MG: 100 INJECTION, SOLUTION INTRAVENOUS at 17:05

## 2021-10-06 RX ADMIN — LABETALOL HYDROCHLORIDE 10 MG: 5 INJECTION INTRAVENOUS at 06:19

## 2021-10-06 RX ADMIN — LORAZEPAM 1 MG: 2 INJECTION INTRAMUSCULAR; INTRAVENOUS at 22:21

## 2021-10-06 RX ADMIN — Medication 10 ML: at 06:10

## 2021-10-06 RX ADMIN — LORAZEPAM 1 MG: 2 INJECTION INTRAMUSCULAR; INTRAVENOUS at 12:07

## 2021-10-06 RX ADMIN — CEFTRIAXONE 1 G: 1 INJECTION, POWDER, FOR SOLUTION INTRAMUSCULAR; INTRAVENOUS at 15:46

## 2021-10-06 RX ADMIN — Medication 10 ML: at 06:20

## 2021-10-06 RX ADMIN — LORAZEPAM 1 MG: 2 INJECTION INTRAMUSCULAR; INTRAVENOUS at 18:09

## 2021-10-06 RX ADMIN — LABETALOL HYDROCHLORIDE 10 MG: 5 INJECTION INTRAVENOUS at 12:07

## 2021-10-06 RX ADMIN — LABETALOL HYDROCHLORIDE 10 MG: 5 INJECTION INTRAVENOUS at 18:10

## 2021-10-06 RX ADMIN — QUETIAPINE FUMARATE 25 MG: 25 TABLET ORAL at 21:00

## 2021-10-06 RX ADMIN — DIVALPROEX SODIUM 125 MG: 125 CAPSULE ORAL at 20:48

## 2021-10-06 RX ADMIN — Medication 10 ML: at 21:00

## 2021-10-06 RX ADMIN — Medication 10 ML: at 12:12

## 2021-10-06 RX ADMIN — LEVETIRACETAM 500 MG: 100 INJECTION, SOLUTION INTRAVENOUS at 06:10

## 2021-10-06 RX ADMIN — DEXTROSE MONOHYDRATE AND SODIUM CHLORIDE 50 ML/HR: 5; .45 INJECTION, SOLUTION INTRAVENOUS at 13:49

## 2021-10-06 NOTE — PROGRESS NOTES
SPEECH PATHOLOGY NOTE:    Attempted to see patient for re-assessment this date; however, per RN, patient not appropriate for po trials at this time due to mentation/reduced alertness. Will follow up at later time/date as patient is able to participate and as schedule permits.       America Spears MS, CCC-SLP

## 2021-10-06 NOTE — PROGRESS NOTES
received a phone call from Ms. Ordonez's , Molly Fritz, requesting assistance with visitation. We spoke at length about his experience at the hospital. I spoke to a staff, Security, and the  volunteer to assist the 's visit. Chaplains remain available as needed.     Myah Horton 66  Board Certified

## 2021-10-06 NOTE — PROGRESS NOTES
[facility]    Assessment:  Temporal IPH and SDH    Plan:  -repeat head CT yesterday is stable  -no neurosurgical intervention needed at this time.  -hold all antiplatelets and anticoagulants    Neurosurgery will sign off. Pt will follow up outpatient in 2-4 weeks with a new CT head to be done before visit.     Subjective:    Objective:  Afebrile  VSS  Agitated  Confused  Moving all extremities but will not follow  commands    Patient Vitals for the past 12 hrs:   Temp Pulse Resp BP SpO2   10/06/21 1203  69 (!) 35 (!) 154/70 95 %   10/06/21 1104 98.2 °F (36.8 °C) 64 (!) 33 121/70 98 %   10/06/21 1003  60 24 (!) 147/67 97 %   10/06/21 0902  (!) 55 14 (!) 103/44 99 %   10/06/21 0802 98.4 °F (36.9 °C) (!) 52 16 (!) 143/56 100 %   10/06/21 0700  70 18 (!) 110/45 95 %   10/06/21 0619 99 °F (37.2 °C) 64 18 (!) 149/77 95 %   10/06/21 0400  78           Recent Results (from the past 24 hour(s))   SODIUM    Collection Time: 10/05/21 12:53 PM   Result Value Ref Range    Sodium 146 (H) 136 - 962 mmol/L   METABOLIC PANEL, BASIC    Collection Time: 10/05/21  6:40 PM   Result Value Ref Range    Sodium 147 (H) 136 - 145 mmol/L    Potassium 4.1 3.5 - 5.1 mmol/L    Chloride 114 (H) 98 - 107 mmol/L    CO2 22 21 - 32 mmol/L    Anion gap 11 7 - 16 mmol/L    Glucose 126 (H) 65 - 100 mg/dL    BUN 38 (H) 8 - 23 MG/DL    Creatinine 1.52 (H) 0.6 - 1.0 MG/DL    GFR est AA 42 (L) >60 ml/min/1.73m2    GFR est non-AA 35 (L) >60 ml/min/1.73m2    Calcium 9.3 8.3 - 10.4 MG/DL   CBC WITH AUTOMATED DIFF    Collection Time: 10/05/21  6:40 PM   Result Value Ref Range    WBC 5.7 4.3 - 11.1 K/uL    RBC 3.60 (L) 4.05 - 5.2 M/uL    HGB 11.5 (L) 11.7 - 15.4 g/dL    HCT 35.6 (L) 35.8 - 46.3 %    MCV 98.9 (H) 79.6 - 97.8 FL    MCH 31.9 26.1 - 32.9 PG    MCHC 32.3 31.4 - 35.0 g/dL    RDW 12.1 11.9 - 14.6 %    PLATELET 028 056 - 219 K/uL    MPV 10.8 9.4 - 12.3 FL    ABSOLUTE NRBC 0.00 0.0 - 0.2 K/uL    NEUTROPHILS 79 (H) 43 - 78 % LYMPHOCYTES 11 (L) 13 - 44 %    MONOCYTES 9 4.0 - 12.0 %    EOSINOPHILS 0 (L) 0.5 - 7.8 %    BASOPHILS 0 0.0 - 2.0 %    IMMATURE GRANULOCYTES 1 0.0 - 5.0 %    ABS. NEUTROPHILS 4.5 1.7 - 8.2 K/UL    ABS. LYMPHOCYTES 0.6 0.5 - 4.6 K/UL    ABS. MONOCYTES 0.5 0.1 - 1.3 K/UL    ABS. EOSINOPHILS 0.0 0.0 - 0.8 K/UL    ABS. BASOPHILS 0.0 0.0 - 0.2 K/UL    ABS. IMM. GRANS. 0.1 0.0 - 0.5 K/UL    RBC COMMENTS NORMOCYTIC/NORMOCHROMIC      WBC COMMENTS Result Confirmed By Smear      PLATELET COMMENTS SLIGHT      DF AUTOMATED     METABOLIC PANEL, BASIC    Collection Time: 10/06/21  4:13 AM   Result Value Ref Range    Sodium 145 136 - 145 mmol/L    Potassium 4.1 3.5 - 5.1 mmol/L    Chloride 114 (H) 98 - 107 mmol/L    CO2 25 21 - 32 mmol/L    Anion gap 6 (L) 7 - 16 mmol/L    Glucose 115 (H) 65 - 100 mg/dL    BUN 37 (H) 8 - 23 MG/DL    Creatinine 1.55 (H) 0.6 - 1.0 MG/DL    GFR est AA 41 (L) >60 ml/min/1.73m2    GFR est non-AA 34 (L) >60 ml/min/1.73m2    Calcium 9.2 8.3 - 10.4 MG/DL   CBC WITH AUTOMATED DIFF    Collection Time: 10/06/21  4:13 AM   Result Value Ref Range    WBC 6.3 4.3 - 11.1 K/uL    RBC 3.26 (L) 4.05 - 5.2 M/uL    HGB 10.4 (L) 11.7 - 15.4 g/dL    HCT 32.3 (L) 35.8 - 46.3 %    MCV 99.1 (H) 79.6 - 97.8 FL    MCH 31.9 26.1 - 32.9 PG    MCHC 32.2 31.4 - 35.0 g/dL    RDW 12.1 11.9 - 14.6 %    PLATELET 509 299 - 404 K/uL    MPV 11.3 9.4 - 12.3 FL    ABSOLUTE NRBC 0.00 0.0 - 0.2 K/uL    DF AUTOMATED      NEUTROPHILS 70 43 - 78 %    LYMPHOCYTES 15 13 - 44 %    MONOCYTES 13 (H) 4.0 - 12.0 %    EOSINOPHILS 1 0.5 - 7.8 %    BASOPHILS 1 0.0 - 2.0 %    IMMATURE GRANULOCYTES 0 0.0 - 5.0 %    ABS. NEUTROPHILS 4.4 1.7 - 8.2 K/UL    ABS. LYMPHOCYTES 0.9 0.5 - 4.6 K/UL    ABS. MONOCYTES 0.9 0.1 - 1.3 K/UL    ABS. EOSINOPHILS 0.1 0.0 - 0.8 K/UL    ABS. BASOPHILS 0.0 0.0 - 0.2 K/UL    ABS. IMM.  GRANS. 0.0 0.0 - 0.5 K/UL        Letty Ramirez NP

## 2021-10-06 NOTE — PROGRESS NOTES
Patient resting quietly in bed. Awake and restless, slept at short intervals throughout the shift, no distress noted during hourly bedside checks. No request or needs at present.

## 2021-10-06 NOTE — ROUTINE PROCESS
Bedside and Verbal shift change report given to self (oncoming nurse) by Isabela Clifton RN (offgoing nurse). Report included the following information SBAR, Kardex, ED Summary, Intake/Output, MAR and Recent Results. Pt resting calmly at shift change. Does not participate in neuro check due to drowsiness. Does withdraw from painful stimuli.    Pt in SR. +

## 2021-10-06 NOTE — PROGRESS NOTES
Hospitalist Progress Note   Admit Date:  10/3/2021  2:16 AM   Name:  Adele Bedoya   Age:  78 y.o. Sex:  female  :  1940   MRN:  [id number]   Room:  [facility]/    Presenting Complaint: Headache    Reason(s) for Admission: Intracranial bleed ([facility] [address] [I62.9]  Dementia ([facility] [address] [F03.90]  Poorly-controlled hypertension [I10]  Hypothyroidism [E03.9]  Encephalopathy, unspecified [G93.40]     Hospital Course & Interval History:   Mrs. Torie Pizano is an 77 y/o female with a h/o HTN, dementia, hypothyroidism presented to emergency room with headache, worsening of confusion.  Patient was complaining headache to family about right side which continued to get worse over the right temporal area.  As per family patient is getting more agitated, confused but no history of slurring in speech, patient was brought to emergency room.  Patient was evaluated, CT head shows evidence of right temporal parenchymal hemorrhage around 2 cm.  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     Patient continues to suffer from agitation and altered mental status in the setting of underlying dementia and acute right-sided temporal parenchymal hemorrhage with vasogenic edema and subdural hemorrhage. Patient had Code S called overnight 10/3 for severe headache associated with nausea and dry heaving. Patient was started on mannitol and Cardene to reduce intracranial pressure. Stat CT head showed no new changes. Patient remains agitated/combative requiring restraints and medication. CT head 10/5 unchanged, now off hypertonic saline and mannitol.      Subjective (10/06/21): In bed, disoriented, unable to get any ROS. No overnight events per RN.      Assessment & Plan:   # Acute R temporal lobe IPH   - Con't Keppra, appreciate Neurosurgery help, non-surgical management. CTA showed aneurysm but not in area of her hemorrhage. Repeat CT 10/5 was unchanged. # Acute metabolic encephalopathy   - Multifactorial from hospitalization/ICU, dementia, acute hemorrhage. Rocephin for cystitis. On Seroquel, otherwise con't non-pharm management. # Acute cystitis   - Cx with GNRs, on Rocephin. # HTN   - Off Cardene now. # Dementia    Dispo/Discharge Planning: Placement.   Diet:  DIET NPO  DVT PPx: SCDs only with ICH  Code status: Full Code    Hospital Problems as of 10/6/2021 Never Reviewed        Codes Class Noted - Resolved POA    Encephalopathy, unspecified ICD-10-CM: G93.40  ICD-9-CM: 348.30  10/3/2021 - Present Unknown        Dementia ([facility] [address] ICD-10-CM: F03.90  ICD-9-CM: 294.20  10/3/2021 - Present Unknown        * (Principal) Intracranial bleed ([facility] [address] ICD-10-CM: I62.9  ICD-9-CM: 432.9  10/3/2021 - Present Unknown        Poorly-controlled hypertension ICD-10-CM: I10  ICD-9-CM: 401.9  10/3/2021 - Present Unknown        Hypothyroidism ICD-10-CM: E03.9  ICD-9-CM: 244.9  6/29/2012 - Present Unknown              Objective:     Patient Vitals for the past 24 hrs:   Temp Pulse Resp BP SpO2   10/06/21 1104 98.2 °F (36.8 °C) 64 (!) 33 121/70 98 %   10/06/21 1003  60 24 (!) 147/67 97 %   10/06/21 0902  (!) 55 14 (!) 103/44 99 %   10/06/21 0802 98.4 °F (36.9 °C) (!) 52 16 (!) 143/56 100 %   10/06/21 0700  70 18 (!) 110/45 95 %   10/06/21 0619 99 °F (37.2 °C) 64 18 (!) 149/77 95 %   10/06/21 0400  78      10/06/21 0000  70      10/05/21 2325 98.7 °F (37.1 °C) 68 18 (!) 161/77 95 %   10/05/21 2000 98.8 °F (37.1 °C) 70 18 (!) 140/69 95 %   10/05/21 1905  77 25 (!) 145/69 95 %   10/05/21 1728  71 19 (!) 146/67 95 %   10/05/21 1530 98.9 °F (37.2 °C) 79 24 136/68 95 %   10/05/21 1455  98  (!) 165/67    10/05/21 1424  97 23 (!) 159/71 95 %   10/05/21 1202 99 °F (37.2 °C) 92 22 (!) 155/74 95 %     Oxygen Therapy  O2 Sat (%): 98 % (10/06/21 1104)  Pulse via Oximetry: 65 beats per minute (10/06/21 1104)  O2 Device: Nasal cannula (10/06/21 1100)  Skin Assessment: Clean, dry, & intact (10/06/21 1100)  Skin Protection for O2 Device: N/A (10/03/21 2001)  O2 Flow Rate (L/min): 3 l/min (10/06/21 1100)    Estimated body mass index is 26.52 kg/m² as calculated from the following:    Height as of this encounter: 5' 5\" (1.651 m). Weight as of this encounter: 72.3 kg (159 lb 6.3 oz). Intake/Output Summary (Last 24 hours) at 10/6/2021 1146  Last data filed at 10/6/2021 1100  Gross per 24 hour   Intake 167.5 ml   Output 600 ml   Net -432.5 ml         Physical Exam:   General:    Disoriented, mild agitation. Head:  Normocephalic, atraumatic  Eyes:  Sclerae appear normal.  Pupils equally round. ENT:  Nares appear normal, no drainage. Moist oral mucosa  Neck:  No restricted ROM. Trachea midline   CV:   RRR. No m/r/g. No jugular venous distension. Lungs:   CTAB. No wheezing, rhonchi, or rales. Respirations even, unlabored  Abdomen: Bowel sounds present. Soft, nontender, nondistended. Extremities: No cyanosis or clubbing. No edema  Skin:     No rashes and normal coloration. Warm and dry. Neuro:  Unable to fully assess due to disorientation. Won't follow commands but is speaking and moving all extremities spontaneously. Psych:  As above. Unable to assess. Disoriented.      I have reviewed ordered lab tests and independently visualized imaging below:    Last 24hr Labs:  Recent Results (from the past 24 hour(s))   SODIUM    Collection Time: 10/05/21 12:53 PM   Result Value Ref Range    Sodium 146 (H) 136 - 005 mmol/L   METABOLIC PANEL, BASIC    Collection Time: 10/05/21  6:40 PM   Result Value Ref Range    Sodium 147 (H) 136 - 145 mmol/L    Potassium 4.1 3.5 - 5.1 mmol/L    Chloride 114 (H) 98 - 107 mmol/L    CO2 22 21 - 32 mmol/L    Anion gap 11 7 - 16 mmol/L    Glucose 126 (H) 65 - 100 mg/dL    BUN 38 (H) 8 - 23 MG/DL    Creatinine 1.52 (H) 0.6 - 1.0 MG/DL    GFR est AA 42 (L) >60 ml/min/1.73m2    GFR est non-AA 35 (L) >60 ml/min/1.73m2    Calcium 9.3 8.3 - 10.4 MG/DL   CBC WITH AUTOMATED DIFF    Collection Time: 10/05/21  6:40 PM   Result Value Ref Range    WBC 5.7 4.3 - 11.1 K/uL    RBC 3.60 (L) 4.05 - 5.2 M/uL    HGB 11.5 (L) 11.7 - 15.4 g/dL    HCT 35.6 (L) 35.8 - 46.3 %    MCV 98.9 (H) 79.6 - 97.8 FL    MCH 31.9 26.1 - 32.9 PG    MCHC 32.3 31.4 - 35.0 g/dL    RDW 12.1 11.9 - 14.6 %    PLATELET 084 585 - 744 K/uL    MPV 10.8 9.4 - 12.3 FL    ABSOLUTE NRBC 0.00 0.0 - 0.2 K/uL    NEUTROPHILS 79 (H) 43 - 78 %    LYMPHOCYTES 11 (L) 13 - 44 %    MONOCYTES 9 4.0 - 12.0 %    EOSINOPHILS 0 (L) 0.5 - 7.8 %    BASOPHILS 0 0.0 - 2.0 %    IMMATURE GRANULOCYTES 1 0.0 - 5.0 %    ABS. NEUTROPHILS 4.5 1.7 - 8.2 K/UL    ABS. LYMPHOCYTES 0.6 0.5 - 4.6 K/UL    ABS. MONOCYTES 0.5 0.1 - 1.3 K/UL    ABS. EOSINOPHILS 0.0 0.0 - 0.8 K/UL    ABS. BASOPHILS 0.0 0.0 - 0.2 K/UL    ABS. IMM.  GRANS. 0.1 0.0 - 0.5 K/UL    RBC COMMENTS NORMOCYTIC/NORMOCHROMIC      WBC COMMENTS Result Confirmed By Smear      PLATELET COMMENTS SLIGHT      DF AUTOMATED     METABOLIC PANEL, BASIC    Collection Time: 10/06/21  4:13 AM   Result Value Ref Range    Sodium 145 136 - 145 mmol/L    Potassium 4.1 3.5 - 5.1 mmol/L    Chloride 114 (H) 98 - 107 mmol/L    CO2 25 21 - 32 mmol/L    Anion gap 6 (L) 7 - 16 mmol/L    Glucose 115 (H) 65 - 100 mg/dL    BUN 37 (H) 8 - 23 MG/DL    Creatinine 1.55 (H) 0.6 - 1.0 MG/DL    GFR est AA 41 (L) >60 ml/min/1.73m2    GFR est non-AA 34 (L) >60 ml/min/1.73m2    Calcium 9.2 8.3 - 10.4 MG/DL   CBC WITH AUTOMATED DIFF    Collection Time: 10/06/21  4:13 AM   Result Value Ref Range    WBC 6.3 4.3 - 11.1 K/uL    RBC 3.26 (L) 4.05 - 5.2 M/uL    HGB 10.4 (L) 11.7 - 15.4 g/dL    HCT 32.3 (L) 35.8 - 46.3 %    MCV 99.1 (H) 79.6 - 97.8 FL    MCH 31.9 26.1 - 32.9 PG    MCHC 32.2 31.4 - 35.0 g/dL    RDW 12.1 11.9 - 14.6 %    PLATELET 064 359 - 081 K/uL    MPV 11.3 9.4 - 12.3 FL    ABSOLUTE NRBC 0.00 0.0 - 0.2 K/uL    DF AUTOMATED      NEUTROPHILS 70 43 - 78 %    LYMPHOCYTES 15 13 - 44 %    MONOCYTES 13 (H) 4.0 - 12.0 %    EOSINOPHILS 1 0.5 - 7.8 %    BASOPHILS 1 0.0 - 2.0 %    IMMATURE GRANULOCYTES 0 0.0 - 5.0 %    ABS. NEUTROPHILS 4.4 1.7 - 8.2 K/UL    ABS. LYMPHOCYTES 0.9 0.5 - 4.6 K/UL    ABS. MONOCYTES 0.9 0.1 - 1.3 K/UL    ABS. EOSINOPHILS 0.1 0.0 - 0.8 K/UL    ABS. BASOPHILS 0.0 0.0 - 0.2 K/UL    ABS. IMM. GRANS. 0.0 0.0 - 0.5 K/UL       All Micro Results     Procedure Component Value Units Date/Time    CULTURE, URINE [[id number]]  (Abnormal) Collected: 10/04/21 2107    Order Status: Completed Specimen: Cath Urine Updated: 10/06/21 6627     Special Requests: NO SPECIAL REQUESTS        Culture result:       >100,000 COLONIES/mL GRAM NEGATIVE RODS IDENTIFICATION AND SUSCEPTIBILITY TO FOLLOW                Other Studies:  CT HEAD WO CONT    Result Date: 10/5/2021  CT HEAD WITHOUT CONTRAST. INDICATION: Intracranial hemorrhage, follow-up. COMPARISON: 04 October and 03 October 2021  TECHNIQUE:   5 mm axial scans from the skull base to the vertex. Our CT scanners use one or more of the following:  Automated exposure control, adjustment of the mA and or kV according to patient size, iterative reconstruction. FINDINGS:  Right temporal hemorrhage and surrounding edema stable from yesterday's exam. No new hemorrhages. The ventricles are normal size. Bilateral white matter low attenuation is present, nonspecific, likely chronic small vessel disease. No mass effect. Posterior fossa: Unremarkable. Included portion of the: Paranasal sinuses and mastoid air cells clear. Globes and orbits grossly unremarkable. Stable right temporal lobe hemorrhage without significant change. No new hemorrhages.        Current Meds:  Current Facility-Administered Medications   Medication Dose Route Frequency    levETIRAcetam (KEPPRA) 500 mg in 0.9% sodium chloride (MBP/ADV) 100 mL MBP  500 mg IntraVENous Q12H    QUEtiapine (SEROquel) tablet 25 mg  25 mg Oral QHS    dextrose 5 % - 0.45% NaCl infusion  50 mL/hr IntraVENous CONTINUOUS    cefTRIAXone (ROCEPHIN) 1 g in 0.9% sodium chloride (MBP/ADV) 50 mL MBP  1 g IntraVENous Q24H    [Held by provider] divalproex (DEPAKOTE SPRINKLE) capsule 125 mg  125 mg Oral Q12H    levothyroxine (SYNTHROID) tablet 112 mcg  112 mcg Oral ACB    LORazepam (ATIVAN) injection 1 mg  1 mg IntraVENous Q4H PRN    sodium chloride (NS) flush 5-40 mL  5-40 mL IntraVENous Q8H    sodium chloride (NS) flush 5-40 mL  5-40 mL IntraVENous PRN    labetaloL (NORMODYNE;TRANDATE) injection 10 mg  10 mg IntraVENous Q4H PRN    hydrALAZINE (APRESOLINE) 20 mg/mL injection 15 mg  15 mg IntraVENous Q6H PRN    metoclopramide HCl (REGLAN) injection 5 mg  5 mg IntraVENous Q6H PRN    [Held by provider] amLODIPine (NORVASC) tablet 5 mg  5 mg Oral DAILY    niCARdipine (CARDENE) 25 mg in 0.9% sodium chloride (MBP/ADV) 250 mL infusion  0-15 mg/hr IntraVENous TITRATE    ondansetron (ZOFRAN) injection 8 mg  8 mg IntraVENous Q8H PRN       Signed:  Gilberto Jain MD    Part of this note may have been written by using a voice dictation software. The note has been proof read but may still contain some grammatical/other typographical errors.

## 2021-10-06 NOTE — ROUTINE PROCESS
Bedside and Verbal shift change report given to Berta Tinajero RN (oncoming nurse) by self [place] nurse). Report included the following information SBAR, Kardex, ED Summary, Intake/Output, MAR, Recent Results and Cardiac Rhythm SR . Goal BP Sys < 140. Recent admin on MAR of Labetalol and Ativan to help pt become less agitated & bring BP down. Mitts on.

## 2021-10-07 ENCOUNTER — APPOINTMENT (OUTPATIENT)
Dept: GENERAL RADIOLOGY | Age: 81
DRG: 064 | End: 2021-10-07
Attending: INTERNAL MEDICINE
Payer: MEDICARE

## 2021-10-07 ENCOUNTER — APPOINTMENT (OUTPATIENT)
Dept: NON INVASIVE DIAGNOSTICS | Age: 81
DRG: 064 | End: 2021-10-07
Attending: HOSPITALIST
Payer: MEDICARE

## 2021-10-07 LAB
BACTERIA SPEC CULT: ABNORMAL
ECHO AV AREA PEAK VELOCITY: 2.13 CM2
ECHO AV AREA VTI: 2.69 CM2
ECHO AV AREA/BSA PEAK VELOCITY: 1.2 CM2/M2
ECHO AV AREA/BSA VTI: 1.5 CM2/M2
ECHO AV CUSP MM: 1.8 CM
ECHO AV MEAN GRADIENT: 5 MMHG
ECHO AV PEAK GRADIENT: 13 MMHG
ECHO AV PEAK VELOCITY: 179 CM/S
ECHO AV VTI: 38.6 CM
ECHO EST RA PRESSURE: 8 MMHG
ECHO LA AREA 2C: 22.2 CM2
ECHO LA AREA 4C: 16 CM2
ECHO LA MAJOR AXIS: 5.64 CM
ECHO LA MINOR AXIS: 3.1 CM
ECHO LV E' LATERAL VELOCITY: 7.83 CM/S
ECHO LV E' SEPTAL VELOCITY: 7.64 CM/S
ECHO LV EDV A4C: 35.1 CM3
ECHO LV ESV A4C: 13.6 CM3
ECHO LV INTERNAL DIMENSION DIASTOLIC: 3.85 CM (ref 3.9–5.3)
ECHO LV INTERNAL DIMENSION SYSTOLIC: 2.49 CM
ECHO LV IVSD: 1.09 CM (ref 0.6–0.9)
ECHO LV MASS 2D: 124 G (ref 67–162)
ECHO LV MASS INDEX 2D: 68.1 G/M2 (ref 43–95)
ECHO LV POSTERIOR WALL DIASTOLIC: 0.96 CM (ref 0.6–0.9)
ECHO LVOT DIAM: 1.9 CM
ECHO LVOT PEAK GRADIENT: 7 MMHG
ECHO LVOT VTI: 36.5 CM
ECHO MV A VELOCITY: 132 CM/S
ECHO MV E DECELERATION TIME (DT): 222 MS
ECHO MV E VELOCITY: 138 CM/S
ECHO MV E/A RATIO: 1.05
ECHO MV E/E' LATERAL: 17.62
ECHO MV E/E' RATIO (AVERAGED): 17.84
ECHO MV E/E' SEPTAL: 18.06
ECHO MV MAX VELOCITY: 165 CM/S
ECHO MV MEAN GRADIENT: 3 MMHG
ECHO MV PEAK GRADIENT: 11 MMHG
ECHO MV VTI: 47.8 CM
ECHO RIGHT VENTRICULAR SYSTOLIC PRESSURE (RVSP): 58 MMHG
ECHO RV INTERNAL DIMENSION: 2.43 CM
ECHO RV TAPSE: 2.86 CM (ref 1.5–2)
ECHO TV REGURGITANT MAX VELOCITY: 354 CM/S
ECHO TV REGURGITANT PEAK GRADIENT: 50 MMHG
SERVICE CMNT-IMP: ABNORMAL

## 2021-10-07 PROCEDURE — 74011000250 HC RX REV CODE- 250: Performed by: STUDENT IN AN ORGANIZED HEALTH CARE EDUCATION/TRAINING PROGRAM

## 2021-10-07 PROCEDURE — 74011250636 HC RX REV CODE- 250/636: Performed by: EMERGENCY MEDICINE

## 2021-10-07 PROCEDURE — 77010033711 HC HIGH FLOW OXYGEN

## 2021-10-07 PROCEDURE — 51798 US URINE CAPACITY MEASURE: CPT

## 2021-10-07 PROCEDURE — 74011250636 HC RX REV CODE- 250/636: Performed by: STUDENT IN AN ORGANIZED HEALTH CARE EDUCATION/TRAINING PROGRAM

## 2021-10-07 PROCEDURE — 74011250636 HC RX REV CODE- 250/636: Performed by: INTERNAL MEDICINE

## 2021-10-07 PROCEDURE — 74011250637 HC RX REV CODE- 250/637: Performed by: INTERNAL MEDICINE

## 2021-10-07 PROCEDURE — 93306 TTE W/DOPPLER COMPLETE: CPT

## 2021-10-07 PROCEDURE — 65270000029 HC RM PRIVATE

## 2021-10-07 PROCEDURE — 74011000258 HC RX REV CODE- 258: Performed by: STUDENT IN AN ORGANIZED HEALTH CARE EDUCATION/TRAINING PROGRAM

## 2021-10-07 PROCEDURE — 71045 X-RAY EXAM CHEST 1 VIEW: CPT

## 2021-10-07 PROCEDURE — 74011250636 HC RX REV CODE- 250/636: Performed by: HOSPITALIST

## 2021-10-07 RX ORDER — ACETAMINOPHEN 325 MG/1
650 TABLET ORAL
Status: DISCONTINUED | OUTPATIENT
Start: 2021-10-07 | End: 2021-10-22 | Stop reason: HOSPADM

## 2021-10-07 RX ORDER — QUETIAPINE FUMARATE 25 MG/1
12.5 TABLET, FILM COATED ORAL
Status: DISCONTINUED | OUTPATIENT
Start: 2021-10-07 | End: 2021-10-22 | Stop reason: HOSPADM

## 2021-10-07 RX ORDER — AMLODIPINE BESYLATE 5 MG/1
10 TABLET ORAL DAILY
Status: DISCONTINUED | OUTPATIENT
Start: 2021-10-07 | End: 2021-10-09

## 2021-10-07 RX ORDER — FUROSEMIDE 10 MG/ML
40 INJECTION INTRAMUSCULAR; INTRAVENOUS ONCE
Status: COMPLETED | OUTPATIENT
Start: 2021-10-07 | End: 2021-10-07

## 2021-10-07 RX ADMIN — DEXTROSE MONOHYDRATE AND SODIUM CHLORIDE 50 ML/HR: 5; .45 INJECTION, SOLUTION INTRAVENOUS at 06:18

## 2021-10-07 RX ADMIN — FUROSEMIDE 40 MG: 10 INJECTION, SOLUTION INTRAMUSCULAR; INTRAVENOUS at 14:45

## 2021-10-07 RX ADMIN — QUETIAPINE FUMARATE 12.5 MG: 25 TABLET, FILM COATED ORAL at 22:00

## 2021-10-07 RX ADMIN — Medication 10 ML: at 05:08

## 2021-10-07 RX ADMIN — LABETALOL HYDROCHLORIDE 10 MG: 5 INJECTION INTRAVENOUS at 22:11

## 2021-10-07 RX ADMIN — HYDRALAZINE HYDROCHLORIDE 15 MG: 20 INJECTION INTRAMUSCULAR; INTRAVENOUS at 16:51

## 2021-10-07 RX ADMIN — LEVETIRACETAM 500 MG: 100 INJECTION, SOLUTION INTRAVENOUS at 17:39

## 2021-10-07 RX ADMIN — LORAZEPAM 1 MG: 2 INJECTION INTRAMUSCULAR; INTRAVENOUS at 19:41

## 2021-10-07 RX ADMIN — LORAZEPAM 1 MG: 2 INJECTION INTRAMUSCULAR; INTRAVENOUS at 23:40

## 2021-10-07 RX ADMIN — LABETALOL HYDROCHLORIDE 10 MG: 5 INJECTION INTRAVENOUS at 02:21

## 2021-10-07 RX ADMIN — Medication 10 ML: at 22:04

## 2021-10-07 RX ADMIN — LEVETIRACETAM 500 MG: 100 INJECTION, SOLUTION INTRAVENOUS at 05:05

## 2021-10-07 RX ADMIN — LORAZEPAM 1 MG: 2 INJECTION INTRAMUSCULAR; INTRAVENOUS at 02:21

## 2021-10-07 RX ADMIN — CEFTRIAXONE 1 G: 1 INJECTION, POWDER, FOR SOLUTION INTRAMUSCULAR; INTRAVENOUS at 16:51

## 2021-10-07 RX ADMIN — HYDRALAZINE HYDROCHLORIDE 15 MG: 20 INJECTION INTRAMUSCULAR; INTRAVENOUS at 05:05

## 2021-10-07 NOTE — PROGRESS NOTES
Hospitalist Progress Note   Admit Date:  10/3/2021  2:16 AM   Name:  Michael Young   Age:  78 y.o. Sex:  female  :  1940   MRN:  [id number]   Room:  [facility]/    Presenting Complaint: Headache    Reason(s) for Admission: Intracranial bleed ([facility] [address] [I62.9]  Dementia ([facility] [address] [F03.90]  Poorly-controlled hypertension [I10]  Hypothyroidism [E03.9]  Encephalopathy, unspecified [G93.40]     Hospital Course & Interval History:   Mrs. Diana Goodman is an 79 y/o WF with a h/o HTN, dementia, hypothyroidism who presented to ER on 10/3 with c/o headache and confusion. CT head showed R temporal IPH around 2 cm. 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 Evaluated by Neurosurgery evaluated who did not feel her bleed was related to the aneurysm. Admitted to ICU for BP control and frequent Neuro checks. Code S was called overnight 10/3 for severe headache associated with nausea and dry heaving. She was started on mannitol and Cardene to reduce intracranial pressure. Stat CT head showed no new changes. Has had intermittent delirium and agitation and started on Depakote and Seroquel. CT head 10/5 unchanged, now off hypertonic saline and mannitol. Meds adjusted due to over sedation on 10/5 (Seroquel dose halved, Depakote held). This improved but she became more agitated on 10/6 so Depakote was unheld. Subjective (10/07/21): Somnolent this AM, minimal urine output per RN. BPs controlled. Will not wake up or follow commands. Depakote was unheld yesterday evening due to ongoing agitation throughout the day. Unable to get ROS. Assessment & Plan:   # Acute R temporal lobe IPH              - Con't Keppra, appreciate Neurosurgery help, non-surgical management. CTA showed aneurysm but not in area of her hemorrhage. Repeat CT 10/5 was unchanged. Norvasc if able to take PO, otherwise will use IV PRNs. Off Cardene so will d/c order.  Can transfer to the floor.     # Acute metabolic encephalopathy              - Multifactorial from hospitalization/ICU, dementia, acute hemorrhage. Rocephin for cystitis, EOT 10/7. Depakote unheld yesterday which may be contributing to her sedation today. Will place on hold again and decrease Seroquel dosing. # Hypoxia   - Desat to 84% overnight? On 8L with bedside sats 98%. Will get CXR. Afebrile. Wean O2 as able, doubt she needs 8L currently. # Acute cystitis              - Pan-sensitive klebsiella, ceftriaxone EOT 10/7.     # HTN              - Off Cardene. Norvasc if able to take PO, IVs otherwise.     # Dementia    Dispo/Discharge Planning: Unclear, pending.   Diet:  DIET NPO  DVT PPx: SCDs only with ICH  Code status: Full Code    Hospital Problems as of 10/7/2021 Never Reviewed        Codes Class Noted - Resolved POA    Encephalopathy, unspecified ICD-10-CM: G93.40  ICD-9-CM: 348.30  10/3/2021 - Present Unknown        Dementia ([facility] [address] ICD-10-CM: F03.90  ICD-9-CM: 294.20  10/3/2021 - Present Unknown        * (Principal) Intracranial bleed ([facility] [address] ICD-10-CM: I62.9  ICD-9-CM: 432.9  10/3/2021 - Present Unknown        Poorly-controlled hypertension ICD-10-CM: I10  ICD-9-CM: 401.9  10/3/2021 - Present Unknown        Hypothyroidism ICD-10-CM: E03.9  ICD-9-CM: 244.9  6/29/2012 - Present Unknown              Objective:     Patient Vitals for the past 24 hrs:   Temp Pulse Resp BP SpO2   10/07/21 1000  63 14 (!) 106/47 97 %   10/07/21 0900  63 17 (!) 123/53 97 %   10/07/21 0856  63 16 (!) 123/53 97 %   10/07/21 0855  64 15  97 %   10/07/21 0854  64 16  98 %   10/07/21 0853  63 16  97 %   10/07/21 0852  63 (!) 32  97 %   10/07/21 0851  64 (!) 35 (!) 140/69 97 %   10/07/21 0850  63 26  97 %   10/07/21 0849  64 27  97 %   10/07/21 0848  64 27  97 %   10/07/21 0847  65 (!) 50  96 %   10/07/21 0846  65 23  97 %   10/07/21 0845  63 (!) 44  98 %   10/07/21 0844  63 (!) 32  98 %   10/07/21 0843  62 20  98 %   10/07/21 0842  63 18  98 %   10/07/21 0841  63 17  98 %   10/07/21 0840  64 19  98 %   10/07/21 0839  63 15  98 %   10/07/21 0838  63 19  98 %   10/07/21 0837  64 18  98 %   10/07/21 0836  65 (!) 32  97 %   10/07/21 0835  64 17  97 %   10/07/21 0834  64 26  98 %   10/07/21 0833  65 29  97 %   10/07/21 0832  64 27  97 %   10/07/21 0831  70 23  97 %   10/07/21 0830  76 24  96 %   10/07/21 0829  77 21  95 %   10/07/21 0828  73 26  96 %   10/07/21 0827  70 18  97 %   10/07/21 0826  70 26  96 %   10/07/21 0825  76 19 (!) 152/76 95 %   10/07/21 0820  73 (!) 33  95 %   10/07/21 0800  67 (!) 36 135/69 (!) 84 %   10/07/21 0700 98.2 °F (36.8 °C) 61 (!) 42 (!) 123/56 98 %   10/07/21 0630  74 25 (!) 160/84 95 %   10/07/21 0600  76 26 (!) 163/80 96 %   10/07/21 0534  76 (!) 42 (!) 163/79 96 %   10/07/21 0508  69 25 (!) 189/90 96 %   10/07/21 0501  70 (!) 53 (!) 166/83 96 %   10/07/21 0500 98.5 °F (36.9 °C)       10/07/21 0456  78 28 (!) 184/90 96 %   10/07/21 0454  76 26 (!) 173/94 96 %   10/07/21 0431  74 23 (!) 172/95 96 %   10/07/21 0401  74 (!) 35 (!) 172/89 97 %   10/07/21 0331  60 16 134/74 98 %   10/07/21 0300  62 24 (!) 145/72 98 %   10/07/21 0231  72 (!) 52 (!) 157/78 97 %   10/07/21 0221    (!) 177/86    10/07/21 0201  71 27 (!) 177/86 96 %   10/07/21 0131  69 14 135/72 98 %   10/07/21 0101  71 16 134/71 98 %   10/07/21 0031  73 17 138/64 98 %   10/07/21 0022 98.8 °F (37.1 °C)       10/07/21 0001  76 21 (!) 142/71 97 %   10/06/21 2048  66  (!) 186/85    10/06/21 2014 99.2 °F (37.3 °C)       10/06/21 2000  67 28 (!) 178/82 93 %   10/06/21 1930  67 22 (!) 185/82 (!) 89 %   10/06/21 1928  68 29 (!) 177/71 91 %   10/06/21 1902  67 21 (!) 177/71 90 %   10/06/21 1900  72 24  90 %   10/06/21 1831  66 (!) 33 (!) 167/77 (!) 89 %   10/06/21 1802  70 (!) 48 (!) 160/76 91 %   10/06/21 1702  72 23 139/78 97 %   10/06/21 1631  67 27 (!) 159/75 (!) 84 %   10/06/21 1601  61 25 (!) 167/83 94 %   10/06/21 1502 98.8 °F (37.1 °C) (!) 59 15 (!) 128/54 92 %   10/06/21 1402  64 28 (!) 154/70 95 %   10/06/21 1301  78 (!) 50 (!) 157/84 93 %   10/06/21 1203  69 (!) 35 (!) 154/70 95 %     Oxygen Therapy  O2 Sat (%): 97 % (10/07/21 1000)  Pulse via Oximetry: 63 beats per minute (10/07/21 1000)  O2 Device: Hi flow nasal cannula (10/07/21 0825)  Skin Assessment: Clean, dry, & intact (10/07/21 0825)  Skin Protection for O2 Device: N/A (10/03/21 2001)  O2 Flow Rate (L/min): 6 l/min (10/07/21 1113)    Estimated body mass index is 27.29 kg/m² as calculated from the following:    Height as of this encounter: 5' 5\" (1.651 m). Weight as of this encounter: 74.4 kg (164 lb). Intake/Output Summary (Last 24 hours) at 10/7/2021 1114  Last data filed at 10/7/2021 1031  Gross per 24 hour   Intake 1936.97 ml   Output 200 ml   Net 1736.97 ml         Physical Exam:   General:    Resting comfortably in bed, NAD, somnolent. Head:  Normocephalic, atraumatic. Eyes:  Sclerae appear normal.  Pupils equally round. ENT:  Nares appear normal, no drainage. Dry oral mucosa  Neck:  No restricted ROM. Trachea midline   CV:   RRR. No m/r/g. No jugular venous distension. Lungs:   CTAB. No wheezing, rhonchi, or rales. Respirations even, unlabored  Abdomen: Bowel sounds present. Soft, nontender, nondistended. :  External catheter, small amt dark yellow urine in cannister. Extremities: No cyanosis or clubbing. No edema  Skin:     No rashes and normal coloration. Warm and dry. Neuro:  Unable to assess, somnolent and does not follow commands. Psych:  As above.     I have reviewed ordered lab tests and independently visualized imaging below:    Last 24hr Labs:  Recent Results (from the past 24 hour(s))   ECHO ADULT COMPLETE    Collection Time: 10/07/21  8:55 AM   Result Value Ref Range    Left Atrium Minor Axis 3.10 cm    Left Atrium Major Axis 5.64 cm    LA Area 2C 22.20 cm2    LA Area 4C 16.00 cm2    LV ED Vol A4C 35.10 cm3    LV ES Vol A4C 13.60 cm3    IVSd 1.09 (A) 0.60 - 0.90 cm    LVIDd 3.85 (A) 3.90 - 5.30 cm    LVIDs 2.49 cm    LVOT d 1.90 cm    LVOT VTI 36.50 cm    LVOT Peak Gradient 7.00 mmHg    LVPWd 0.96 (A) 0.60 - 0.90 cm    LV E' Lateral Velocity 7.83 cm/s    LV E' Septal Velocity 7.64 cm/s    AV Cusp 1.80 cm    Aortic Valve Systolic Mean Gradient 0.12 mmHg    AoV VTI 38.60 cm    Aortic Valve Systolic Peak Velocity 038.63 cm/s    AoV PG 13.00 mmHg    Aortic Valve Area by Continuity of VTI 2.69 cm2    Aortic Valve Area by Continuity of Peak Velocity 2.13 cm2    Mitral Valve E Wave Deceleration Time 222.00 ms    MV A Hang 132.00 cm/s    MV E Hang 138.00 cm/s    MV Mean Gradient 3.00 mmHg    Mitral Valve Annulus Velocity Time Integral 47.80 cm    Mitral Valve Max Velocity 165.00 cm/s    MV Peak Gradient 11.00 mmHg    RVIDd 2.43 cm    Tapse 2.86 (A) 1.50 - 2.00 cm    TR Max Velocity 354.00 cm/s    Triscuspid Valve Regurgitation Peak Gradient 50.00 mmHg    MV E/A 1.05     LV Mass .0 67.0 - 162.0 g    LV Mass AL Index 68.1 43.0 - 95.0 g/m2    E/E' lateral 17.62     E/E' septal 18.06     RVSP 58.0 mmHg    E/E' ratio (averaged) 17.84     Est. RA Pressure 8.0 mmHg    NIKKI/BSA Pk Hang 1.2 cm2/m2    NIKKI/BSA VTI 1.5 cm2/m2       All Micro Results     Procedure Component Value Units Date/Time    CULTURE, URINE [[id number]]  (Abnormal)  (Susceptibility) Collected: 10/04/21 2107    Order Status: Completed Specimen: Cath Urine Updated: 10/07/21 0749     Special Requests: NO SPECIAL REQUESTS        Culture result:       >100,000 COLONIES/mL KLEBSIELLA PNEUMONIAE                Other Studies:  ECHO ADULT COMPLETE    Result Date: 10/7/2021  · TV: Mild to moderate tricuspid valve regurgitation is present. Right Ventricular Arterial Pressure (RVSP) is 58 mmHg. Pulmonary hypertension found to be moderate.  · IVC: Moderately elevated central venous pressure (8 mmHg); IVC diameter is larger than 21 mm and collapses more than 50% with respiration. · LV: Estimated LVEF is 60 - 65%. Normal cavity size and systolic function (ejection fraction normal). Mild concentric hypertrophy. Left ventricular diastolic dysfunction. · LA: Mildly dilated left atrium. Left Atrium volume index is 36 mL/m2. · MV: Mitral valve thickening. Mitral valve leaflet calcification without reduced excursion. Mild mitral annular calcification. Mild mitral valve regurgitation is present. MR may be more severe, eccentric, difficult to visualize with patient agitation and respiratory interference, clinical correlation recommended. · Pericardium: Trivial anterior posterior pericardial effusion. Current Meds:  Current Facility-Administered Medications   Medication Dose Route Frequency    amLODIPine (NORVASC) tablet 10 mg  10 mg Oral DAILY    QUEtiapine (SEROquel) tablet 12.5 mg  12.5 mg Oral QHS    levETIRAcetam (KEPPRA) 500 mg in 0.9% sodium chloride (MBP/ADV) 100 mL MBP  500 mg IntraVENous Q12H    dextrose 5 % - 0.45% NaCl infusion  50 mL/hr IntraVENous CONTINUOUS    cefTRIAXone (ROCEPHIN) 1 g in 0.9% sodium chloride (MBP/ADV) 50 mL MBP  1 g IntraVENous Q24H    [Held by provider] divalproex (DEPAKOTE SPRINKLE) capsule 125 mg  125 mg Oral Q12H    levothyroxine (SYNTHROID) tablet 112 mcg  112 mcg Oral ACB    LORazepam (ATIVAN) injection 1 mg  1 mg IntraVENous Q4H PRN    sodium chloride (NS) flush 5-40 mL  5-40 mL IntraVENous Q8H    sodium chloride (NS) flush 5-40 mL  5-40 mL IntraVENous PRN    labetaloL (NORMODYNE;TRANDATE) injection 10 mg  10 mg IntraVENous Q4H PRN    hydrALAZINE (APRESOLINE) 20 mg/mL injection 15 mg  15 mg IntraVENous Q6H PRN    metoclopramide HCl (REGLAN) injection 5 mg  5 mg IntraVENous Q6H PRN    ondansetron (ZOFRAN) injection 8 mg  8 mg IntraVENous Q8H PRN       Signed:  Ne Morales MD    Part of this note may have been written by using a voice dictation software.   The note has been proof read but may still contain some grammatical/other typographical errors.

## 2021-10-07 NOTE — ROUTINE PROCESS
Bedside and Verbal shift change report given to self (oncoming nurse) by Jarred Marques (offgoing nurse). Report included the following information SBAR, Kardex, Intake/Output and MAR.

## 2021-10-07 NOTE — ROUTINE PROCESS
Daughter called requesting update on patients most recent condition and plan of care. Security code verified and update given. No further questions at this time.

## 2021-10-07 NOTE — PROGRESS NOTES
SPEECH PATHOLOGY NOTE:    Patient still not alert enough to participate in po trials. Will re attempt tomorrow.        Wenceslao Corona, [facility], [facility], CCC-SLP

## 2021-10-08 ENCOUNTER — APPOINTMENT (OUTPATIENT)
Dept: GENERAL RADIOLOGY | Age: 81
DRG: 064 | End: 2021-10-08
Attending: EMERGENCY MEDICINE
Payer: MEDICARE

## 2021-10-08 ENCOUNTER — APPOINTMENT (OUTPATIENT)
Dept: CT IMAGING | Age: 81
DRG: 064 | End: 2021-10-08
Attending: INTERNAL MEDICINE
Payer: MEDICARE

## 2021-10-08 LAB
ANION GAP SERPL CALC-SCNC: 7 MMOL/L (ref 7–16)
BUN SERPL-MCNC: 38 MG/DL (ref 8–23)
CALCIUM SERPL-MCNC: 8.8 MG/DL (ref 8.3–10.4)
CHLORIDE SERPL-SCNC: 109 MMOL/L (ref 98–107)
CO2 SERPL-SCNC: 28 MMOL/L (ref 21–32)
CREAT SERPL-MCNC: 1.46 MG/DL (ref 0.6–1)
GLUCOSE SERPL-MCNC: 111 MG/DL (ref 65–100)
POTASSIUM SERPL-SCNC: 3.4 MMOL/L (ref 3.5–5.1)
POTASSIUM SERPL-SCNC: 3.5 MMOL/L (ref 3.5–5.1)
SODIUM SERPL-SCNC: 144 MMOL/L (ref 136–145)

## 2021-10-08 PROCEDURE — 74011250636 HC RX REV CODE- 250/636: Performed by: STUDENT IN AN ORGANIZED HEALTH CARE EDUCATION/TRAINING PROGRAM

## 2021-10-08 PROCEDURE — 74011250637 HC RX REV CODE- 250/637: Performed by: INTERNAL MEDICINE

## 2021-10-08 PROCEDURE — 80048 BASIC METABOLIC PNL TOTAL CA: CPT

## 2021-10-08 PROCEDURE — 84132 ASSAY OF SERUM POTASSIUM: CPT

## 2021-10-08 PROCEDURE — 74011000250 HC RX REV CODE- 250: Performed by: INTERNAL MEDICINE

## 2021-10-08 PROCEDURE — 74011000258 HC RX REV CODE- 258: Performed by: EMERGENCY MEDICINE

## 2021-10-08 PROCEDURE — 86580 TB INTRADERMAL TEST: CPT | Performed by: INTERNAL MEDICINE

## 2021-10-08 PROCEDURE — 70450 CT HEAD/BRAIN W/O DYE: CPT

## 2021-10-08 PROCEDURE — 74011250637 HC RX REV CODE- 250/637: Performed by: STUDENT IN AN ORGANIZED HEALTH CARE EDUCATION/TRAINING PROGRAM

## 2021-10-08 PROCEDURE — 74011000250 HC RX REV CODE- 250: Performed by: EMERGENCY MEDICINE

## 2021-10-08 PROCEDURE — 74011000250 HC RX REV CODE- 250: Performed by: HOSPITALIST

## 2021-10-08 PROCEDURE — 65270000029 HC RM PRIVATE

## 2021-10-08 PROCEDURE — 74011250636 HC RX REV CODE- 250/636: Performed by: EMERGENCY MEDICINE

## 2021-10-08 PROCEDURE — 74011000258 HC RX REV CODE- 258: Performed by: STUDENT IN AN ORGANIZED HEALTH CARE EDUCATION/TRAINING PROGRAM

## 2021-10-08 PROCEDURE — 74011250636 HC RX REV CODE- 250/636: Performed by: INTERNAL MEDICINE

## 2021-10-08 PROCEDURE — 36415 COLL VENOUS BLD VENIPUNCTURE: CPT

## 2021-10-08 PROCEDURE — 74011250636 HC RX REV CODE- 250/636: Performed by: HOSPITALIST

## 2021-10-08 PROCEDURE — 71045 X-RAY EXAM CHEST 1 VIEW: CPT

## 2021-10-08 RX ORDER — POTASSIUM CHLORIDE 14.9 MG/ML
20 INJECTION INTRAVENOUS ONCE
Status: COMPLETED | OUTPATIENT
Start: 2021-10-08 | End: 2021-10-08

## 2021-10-08 RX ORDER — METOPROLOL TARTRATE 5 MG/5ML
5 INJECTION INTRAVENOUS ONCE
Status: DISPENSED | OUTPATIENT
Start: 2021-10-08 | End: 2021-10-08

## 2021-10-08 RX ORDER — METOPROLOL TARTRATE 5 MG/5ML
5 INJECTION INTRAVENOUS ONCE
Status: COMPLETED | OUTPATIENT
Start: 2021-10-08 | End: 2021-10-08

## 2021-10-08 RX ORDER — METOPROLOL TARTRATE 5 MG/5ML
INJECTION INTRAVENOUS
Status: ACTIVE
Start: 2021-10-08 | End: 2021-10-08

## 2021-10-08 RX ORDER — FUROSEMIDE 10 MG/ML
40 INJECTION INTRAMUSCULAR; INTRAVENOUS 2 TIMES DAILY
Status: DISCONTINUED | OUTPATIENT
Start: 2021-10-08 | End: 2021-10-22 | Stop reason: HOSPADM

## 2021-10-08 RX ADMIN — Medication 10 ML: at 13:19

## 2021-10-08 RX ADMIN — PHENYLEPHRINE HYDROCHLORIDE 85 MCG/MIN: 10 INJECTION INTRAVENOUS at 02:20

## 2021-10-08 RX ADMIN — DEXTROSE 150 MG: 50 INJECTION, SOLUTION INTRAVENOUS at 01:45

## 2021-10-08 RX ADMIN — HYDRALAZINE HYDROCHLORIDE 15 MG: 20 INJECTION INTRAMUSCULAR; INTRAVENOUS at 10:49

## 2021-10-08 RX ADMIN — FUROSEMIDE 40 MG: 10 INJECTION, SOLUTION INTRAMUSCULAR; INTRAVENOUS at 10:49

## 2021-10-08 RX ADMIN — PHENYLEPHRINE HYDROCHLORIDE 30 MCG/MIN: 10 INJECTION INTRAVENOUS at 02:01

## 2021-10-08 RX ADMIN — PHENYLEPHRINE HYDROCHLORIDE 70 MCG/MIN: 10 INJECTION INTRAVENOUS at 02:32

## 2021-10-08 RX ADMIN — LEVETIRACETAM 500 MG: 100 INJECTION, SOLUTION INTRAVENOUS at 17:23

## 2021-10-08 RX ADMIN — LEVETIRACETAM 500 MG: 100 INJECTION, SOLUTION INTRAVENOUS at 05:39

## 2021-10-08 RX ADMIN — LABETALOL HYDROCHLORIDE 10 MG: 5 INJECTION INTRAVENOUS at 17:05

## 2021-10-08 RX ADMIN — Medication 10 ML: at 21:20

## 2021-10-08 RX ADMIN — Medication 10 ML: at 05:39

## 2021-10-08 RX ADMIN — FUROSEMIDE 40 MG: 10 INJECTION, SOLUTION INTRAMUSCULAR; INTRAVENOUS at 17:22

## 2021-10-08 RX ADMIN — TUBERCULIN PURIFIED PROTEIN DERIVATIVE 5 UNITS: 5 INJECTION, SOLUTION INTRADERMAL at 17:27

## 2021-10-08 RX ADMIN — POTASSIUM CHLORIDE 20 MEQ: 14.9 INJECTION, SOLUTION INTRAVENOUS at 10:49

## 2021-10-08 RX ADMIN — METOPROLOL TARTRATE 5 MG: 5 INJECTION INTRAVENOUS at 00:31

## 2021-10-08 RX ADMIN — PHENYLEPHRINE HYDROCHLORIDE 30 MCG/MIN: 10 INJECTION INTRAVENOUS at 03:17

## 2021-10-08 RX ADMIN — AMIODARONE HYDROCHLORIDE 1 MG/MIN: 50 INJECTION, SOLUTION INTRAVENOUS at 02:09

## 2021-10-08 RX ADMIN — LORAZEPAM 1 MG: 2 INJECTION INTRAMUSCULAR; INTRAVENOUS at 21:20

## 2021-10-08 RX ADMIN — LABETALOL HYDROCHLORIDE 10 MG: 5 INJECTION INTRAVENOUS at 13:03

## 2021-10-08 NOTE — PROGRESS NOTES
Chart reviewed by [facility] for discharge planning. Developed RVR A-fib overnight. Off drips  patient has not been able to work with PT and OT for evaluation HH vs [facility]?   PPD ordered 10/08    Will continue to follow for discharge planning needs  Please consult  if any new issues arise

## 2021-10-08 NOTE — PROGRESS NOTES
0030- Pt in Afib with few pauses. HR 120s-140s. Dr.Reno-Sparks up to see pt. N/O for Lopressor 5mg ivp and such given slowly at this time. 0210- BP with MAP < 60. Amio bolus given @ 0145. 0210- Amio and Akbar drip initiated at this time. .Will continue to monitor.

## 2021-10-08 NOTE — PROGRESS NOTES
Bedside and verbal shift change report received from  Bhumi Colorado, [address] [place] [address] (offgoing nurse). Report included the following information SBAR, Kardex, ED Summary, Intake/Output, MAR, Recent Results, Med Rec Status, Cardiac Rhythm NSR/Sinus Tachy, Alarm Parameters  and Dual Neuro Assessment. Dual skin assessment completed at bedside: WDL. Allevyns applied to patient's sacrum/coccyx and shins bilaterally. Skin noted to be thin and fragile with scattered bruising. No pressure sores and/or injuries noted at this time.       Dual verification of gtts completed: N/A

## 2021-10-08 NOTE — PROGRESS NOTES
SPEECH PATHOLOGY NOTE:    Patient not alert enough to participate in dysphagia evaluation. Unclear goals but may need to consider short term alternate means of nutrition/hydration if in line with family wishes. SLP will sign off for now as patient has not been appropriate for several days. . Please re consult when more alert and appropriate for dysphagia evaluation.          Dylon [address] CCC-SLP

## 2021-10-08 NOTE — PROGRESS NOTES
Hospitalist Progress Note   Admit Date:  10/3/2021  2:16 AM   Name:  Sharath Gambino   Age:  78 y.o. Sex:  female  :  1940   MRN:  [id number]   Room:  [facility]/    Presenting Complaint: Headache    Reason(s) for Admission: Intracranial bleed ([facility] [I62.9]  Dementia ([facility] [F03.90]  Poorly-controlled hypertension [I10]  Hypothyroidism [E03.9]  Encephalopathy, unspecified [G93.40]     Hospital Course & Interval History:   Mrs. Augustina Humphries is an 77 y/o WF with a h/o HTN, dementia, hypothyroidism who presented to ER on 10/3 with c/o headache and confusion. CT head showed R temporal IPH around 2 cm. 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 Evaluated by Neurosurgery evaluated who did not feel her bleed was related to the aneurysm. Admitted to ICU for BP control and frequent Neuro checks. Code S was called overnight 10/3 for severe headache associated with nausea and dry heaving. She was started on mannitol and Cardene to reduce intracranial pressure. Stat CT head showed no new changes. Has had intermittent delirium and agitation and started on Depakote and Seroquel. CT head 10/5 unchanged, now off hypertonic saline and mannitol.  Meds adjusted due to over sedation on 10/5 (Seroquel dose halved, Depakote held). This improved but she became more agitated on 10/6 so Depakote was unheld. Repeat head CT 10/8 again unchanged. Subjective (10/08/21): Apparently developed AFib RVR overnight, got a dose of Lopressor IV which caused hypotension, then put on Neosynephrine briefly and amiodarone drips, both of which are off now. Now in NSR, BPs better. Still somnolent, asleep, went for repeat head CT which is unchanged. Labs not unchanged. Assessment & Plan:   # Acute hypoxemic respiratory failure   - Likely 2/2 volume overload. Fluids stopped 10/7 and given a dose of Lasix with good output afterwards. Will schedule Lasix and monitor Is/Os.      # Acute metabolic encephalopathy              - Ongoing. Multifactorial from hospitalization/ICU, acute hemorrhage in the setting of underlying dementia. Completed ceftriaxone for cystitis, on 10/7. Depakote held, now holding Seroquel. Repeat head CT 10/8 unchanged. # AFib RVR   - Transient overnight. Off amiodarone. No OAC 2/2 IPH above.     # Acute R temporal lobe IPH              - Con't Keppra, appreciate Neurosurgery help, non-surgical management. CTA showed aneurysm but not in area of her hemorrhage. Repeat CT 10/5 and 10/7 were unchanged. # Acute cystitis              - Pan-sensitive klebsiella, ceftriaxone EOT 10/7.     # HTN              - Off Cardene. Norvasc if able to take PO, IVs otherwise.     # Dementia    Dispo/Discharge Planning: Unclear. Not able to participate in therapy yet due to mentation.   Diet:  DIET NPO  DVT PPx: SCDs only  Code status: Full Code    Hospital Problems as of 10/8/2021 Never Reviewed        Codes Class Noted - Resolved POA    Encephalopathy, unspecified ICD-10-CM: G93.40  ICD-9-CM: 348.30  10/3/2021 - Present Unknown        Dementia ([facility] ICD-10-CM: F03.90  ICD-9-CM: 294.20  10/3/2021 - Present Unknown        * (Principal) Intracranial bleed ([facility] ICD-10-CM: I62.9  ICD-9-CM: 432.9  10/3/2021 - Present Unknown        Poorly-controlled hypertension ICD-10-CM: I10  ICD-9-CM: 401.9  10/3/2021 - Present Unknown        Hypothyroidism ICD-10-CM: E03.9  ICD-9-CM: 244.9  6/29/2012 - Present Unknown              Objective:     Patient Vitals for the past 24 hrs:   Temp Pulse Resp BP SpO2   10/08/21 1300  83 (!) 31 (!) 167/82 92 %   10/08/21 1245  86 30  92 %   10/08/21 1230  85 29  92 %   10/08/21 1215  87 (!) 31  91 %   10/08/21 1200  91 (!) 32 (!) 177/90 90 %   10/08/21 1145  90 29  92 %   10/08/21 1100 97.5 °F (36.4 °C) 78 (!) 33 (!) 170/95 93 %   10/08/21 1045  77 26  94 %   10/08/21 1030  76 (!) 51 (!) 179/87 93 %   10/08/21 1015  74 (!) 34  94 %   10/08/21 1000  78 28 (!) 172/85 96 %   10/08/21 0945  77 (!) 41  95 %   10/08/21 0930  71 (!) 46 (!) 166/85 95 %   10/08/21 0915  76 27  95 %   10/08/21 0900  77 (!) 44 (!) 165/84 95 %   10/08/21 0845  77 (!) 44  95 %   10/08/21 0830  76 (!) 49  94 %   10/08/21 0815  76 (!) 32  94 %   10/08/21 0800  74 26 (!) 162/100 94 %   10/08/21 0745  76 23 (!) 163/80 96 %   10/08/21 0730 97.6 °F (36.4 °C) 72 22 (!) 155/79 97 %   10/08/21 0715  66 (!) 43 (!) 144/74 98 %   10/08/21 0700  (!) 57 12 (!) 98/53 99 %   10/08/21 0645  (!) 56 12 (!) 104/56 99 %   10/08/21 0432  (!) 52 14 (!) 96/54 98 %   10/08/21 0417  (!) 53 14 (!) 108/57 98 %   10/08/21 0402  (!) 54 26 (!) 101/51 98 %   10/08/21 0347  61 (!) 49 129/62 98 %   10/08/21 0345  (!) 58 (!) 42 119/67 98 %   10/08/21 0332  (!) 53 (!) 37 (!) 95/49 98 %   10/08/21 0317  (!) 107 14 110/79 98 %   10/08/21 0302  (!) 108 13 91/62 98 %   10/08/21 0246  (!) 114 14 111/65 99 %   10/08/21 0232  (!) 107 14 108/66 99 %   10/08/21 0217  (!) 126 13 (!) 109/56 98 %   10/08/21 0211  (!) 109 14 (!) 82/73 98 %   10/08/21 0202  (!) 113 14 (!) 66/49 97 %   10/08/21 0159  (!) 112 15 (!) 80/52 97 %   10/08/21 0154  (!) 134 18 (!) 83/57 97 %   10/08/21 0140  (!) 118 15 (!) 90/58 97 %   10/08/21 0130  (!) 122 20 (!) 72/49 98 %   10/08/21 0102  (!) 119 17 107/67 97 %   10/08/21 0037 98.1 °F (36.7 °C)       10/08/21 0032  (!) 136 25 (!) 141/78 97 %   10/08/21 0031  (!) 142  (!) 141/78    10/08/21 0001  (!) 130 30 (!) 136/94 97 %   10/07/21 2331  (!) 140 24 (!) 140/94 96 %   10/07/21 2301  (!) 133 25 (!) 133/90 96 %   10/07/21 2231  (!) 130 29 (!) 134/99 96 %   10/07/21 2210  (!) 137 26 119/72 98 %   10/07/21 2201  (!) 140 22 129/89 98 %   10/07/21 2031    132/81 (!) 87 %   10/07/21 2001 98.2 °F (36.8 °C) (!) 136 21 (!) 148/82 95 %   10/07/21 1951  (!) 130 29 126/72 92 %   10/07/21 1932  (!) 133 28 126/72 92 %   10/07/21 1901  91 25 (!) 158/79 92 %   10/07/21 1800  79 29 (!) 160/70 96 %   10/07/21 1741     (!) 89 %   10/07/21 1730  78 22 119/88 92 %   10/07/21 1700  83 (!) 67 (!) 187/92 93 %   10/07/21 1630 97.7 °F (36.5 °C) 81 (!) 42 (!) 186/92 92 %   10/07/21 1600  69 27 (!) 173/85 95 %   10/07/21 1500  (!) 59 19 126/61 97 %   10/07/21 1406  (!) 58 (!) 31 (!) 119/55 98 %     Oxygen Therapy  O2 Sat (%): 92 % (10/08/21 1300)  Pulse via Oximetry: 83 beats per minute (10/08/21 1300)  O2 Device: Nasal cannula (10/08/21 1100)  Skin Assessment: Clean, dry, & intact (10/08/21 1100)  Skin Protection for O2 Device: N/A (10/03/21 2001)  O2 Flow Rate (L/min): 2 l/min (10/08/21 1100)    Estimated body mass index is 27.29 kg/m² as calculated from the following:    Height as of this encounter: 5' 5\" (1.651 m). Weight as of this encounter: 74.4 kg (164 lb). Intake/Output Summary (Last 24 hours) at 10/8/2021 1310  Last data filed at 10/8/2021 0547  Gross per 24 hour   Intake 535.19 ml   Output 2000 ml   Net -1464.81 ml         Physical Exam:   General:    Somnolent, appears comfortable but does not follow commands. Head:  Normocephalic, atraumatic  Eyes:  Sclerae appear normal.  Pupils equally round. ENT:  Nares appear normal, no drainage. Moist oral mucosa  Neck:  No restricted ROM. Trachea midline   CV:   RRR. No m/r/g. No jugular venous distension. Lungs:   BB rales, 2L NC O2. Abdomen: Bowel sounds present. Soft, nontender, nondistended. Extremities: No cyanosis or clubbing. No edema  Skin:     No rashes and normal coloration. Warm and dry. Neuro:  Unable to assess 2/2 somnolence. Psych:  As above.     I have reviewed ordered lab tests and independently visualized imaging below:    Last 24hr Labs:  Recent Results (from the past 24 hour(s))   METABOLIC PANEL, BASIC    Collection Time: 10/08/21  4:56 AM   Result Value Ref Range    Sodium 144 136 - 145 mmol/L    Potassium 3.4 (L) 3.5 - 5.1 mmol/L    Chloride 109 (H) 98 - 107 mmol/L    CO2 28 21 - 32 mmol/L Anion gap 7 7 - 16 mmol/L    Glucose 111 (H) 65 - 100 mg/dL    BUN 38 (H) 8 - 23 MG/DL    Creatinine 1.46 (H) 0.6 - 1.0 MG/DL    GFR est AA 44 (L) >60 ml/min/1.73m2    GFR est non-AA 36 (L) >60 ml/min/1.73m2    Calcium 8.8 8.3 - 10.4 MG/DL       All Micro Results     Procedure Component Value Units Date/Time    CULTURE, URINE [[id number]]  (Abnormal)  (Susceptibility) Collected: 10/04/21 2107    Order Status: Completed Specimen: Cath Urine Updated: 10/07/21 0769     Special Requests: NO SPECIAL REQUESTS        Culture result:       >100,000 COLONIES/mL KLEBSIELLA PNEUMONIAE                Other Studies:  XR CHEST SNGL V    Result Date: 10/8/2021   Portable view of the chest COMPARISON: Yesterday CLINICAL HISTORY: Respiratory distress. FINDINGS: There are bilateral pleural effusions. There are bilateral interstitial densities, suggesting mild pulmonary edema. No pneumothorax. Heart is enlarged. Mediastinal contours within normal limits. Surrounding bones are stable. 1. Bilateral pleural effusions, cardiomegaly and mild pulmonary edema. Overall findings could be secondary to CHF. 2. No significant change compared to prior exam.    CT HEAD WO CONT    Result Date: 10/8/2021  EXAMINATION: CT HEAD WO CONT 10/8/2021 11:28 AM COMPARISON: 10/05/2021  INDICATION: Known R temporal lobe hemorrhage, ongoing somnolence TECHNIQUE: Multiple-row detector helical CT examination of the head without intravenous contrast. There is significant motion artifact. Radiation dose reduction techniques were used for this study. Our CT scanners use one or all of the following: Automated exposure control, adjustment of the mA and/or kV according to patient size, iterative reconstruction. FINDINGS: Brain: Again seen is parenchymal hemorrhage in the right temporal lobe. This does not appear significant changed from the comparison exam. There is adjacent edema. There is no subarachnoid blood. There is no midline shift. There is no mass. Hypodensity is seen in the periventricular and deep white matter. Ventricles: There is prominence of the ventricles with concomitant sulcal enlargement. Vasculature:  Calcifications are seen in the intracranial internal carotid arteries. Bones: Normal Surrounding soft tissues:  Normal     1. Exam is somewhat limited by patient motion however the known right temporal hemorrhage appears stable. There are no new findings. 2.  Senescent changes       Current Meds:  Current Facility-Administered Medications   Medication Dose Route Frequency    metoprolol (LOPRESSOR) injection 5 mg  5 mg IntraVENous ONCE    furosemide (LASIX) injection 40 mg  40 mg IntraVENous BID    amLODIPine (NORVASC) tablet 10 mg  10 mg Oral DAILY    [Held by provider] QUEtiapine (SEROquel) tablet 12.5 mg  12.5 mg Oral QHS    acetaminophen (TYLENOL) tablet 650 mg  650 mg Oral Q4H PRN    levETIRAcetam (KEPPRA) 500 mg in 0.9% sodium chloride (MBP/ADV) 100 mL MBP  500 mg IntraVENous Q12H    [Held by provider] divalproex (DEPAKOTE SPRINKLE) capsule 125 mg  125 mg Oral Q12H    levothyroxine (SYNTHROID) tablet 112 mcg  112 mcg Oral ACB    LORazepam (ATIVAN) injection 1 mg  1 mg IntraVENous Q4H PRN    sodium chloride (NS) flush 5-40 mL  5-40 mL IntraVENous Q8H    sodium chloride (NS) flush 5-40 mL  5-40 mL IntraVENous PRN    labetaloL (NORMODYNE;TRANDATE) injection 10 mg  10 mg IntraVENous Q4H PRN    hydrALAZINE (APRESOLINE) 20 mg/mL injection 15 mg  15 mg IntraVENous Q6H PRN    ondansetron (ZOFRAN) injection 8 mg  8 mg IntraVENous Q8H PRN       Signed:  Edelmiro Lefort, MD    Part of this note may have been written by using a voice dictation software. The note has been proof read but may still contain some grammatical/other typographical errors.

## 2021-10-08 NOTE — PROGRESS NOTES
Physical Therapy Note:    Attempted to see patient this PM for physical therapy treatment session. Discussed patient with primary RN and OT. RN stated patient is somnolent and not following commands. Will follow and re-attempt as patient is able to participate in treatment.  Thank you,    Ofelia Vegas, PT, DPT  10/8/21 13:32PM

## 2021-10-09 ENCOUNTER — APPOINTMENT (OUTPATIENT)
Dept: GENERAL RADIOLOGY | Age: 81
DRG: 064 | End: 2021-10-09
Attending: INTERNAL MEDICINE
Payer: MEDICARE

## 2021-10-09 ENCOUNTER — APPOINTMENT (OUTPATIENT)
Dept: GENERAL RADIOLOGY | Age: 81
DRG: 064 | End: 2021-10-09
Attending: FAMILY MEDICINE
Payer: MEDICARE

## 2021-10-09 LAB
ANION GAP SERPL CALC-SCNC: 10 MMOL/L (ref 7–16)
ARTERIAL PATENCY WRIST A: POSITIVE
BASE EXCESS BLD CALC-SCNC: 1.9 MMOL/L
BDY SITE: ABNORMAL
BUN SERPL-MCNC: 46 MG/DL (ref 8–23)
CALCIUM SERPL-MCNC: 9.5 MG/DL (ref 8.3–10.4)
CHLORIDE SERPL-SCNC: 104 MMOL/L (ref 98–107)
CO2 SERPL-SCNC: 28 MMOL/L (ref 21–32)
CREAT SERPL-MCNC: 1.59 MG/DL (ref 0.6–1)
ERYTHROCYTE [DISTWIDTH] IN BLOOD BY AUTOMATED COUNT: 12 % (ref 11.9–14.6)
FIO2, L/MIN - FIO2P: 3
GAS FLOW.O2 O2 DELIVERY SYS: ABNORMAL L/MIN
GLUCOSE SERPL-MCNC: 103 MG/DL (ref 65–100)
HCO3 BLD-SCNC: 27.8 MMOL/L (ref 22–26)
HCT VFR BLD AUTO: 43.2 % (ref 35.8–46.3)
HGB BLD-MCNC: 14 G/DL (ref 11.7–15.4)
MAGNESIUM SERPL-MCNC: 2.3 MG/DL (ref 1.8–2.4)
MCH RBC QN AUTO: 31.3 PG (ref 26.1–32.9)
MCHC RBC AUTO-ENTMCNC: 32.4 G/DL (ref 31.4–35)
MCV RBC AUTO: 96.6 FL (ref 79.6–97.8)
MM INDURATION POC: 0 MM (ref 0–5)
NRBC # BLD: 0 K/UL (ref 0–0.2)
PCO2 BLD: 47.8 MMHG (ref 35–45)
PH BLD: 7.37 [PH] (ref 7.35–7.45)
PLATELET # BLD AUTO: 293 K/UL (ref 150–450)
PMV BLD AUTO: 11.5 FL (ref 9.4–12.3)
PO2 BLD: 179 MMHG (ref 75–100)
POTASSIUM SERPL-SCNC: 3.4 MMOL/L (ref 3.5–5.1)
POTASSIUM SERPL-SCNC: 3.4 MMOL/L (ref 3.5–5.1)
PPD POC: NEGATIVE NEGATIVE
RBC # BLD AUTO: 4.47 M/UL (ref 4.05–5.2)
SAO2 % BLD: 99.5 % (ref 95–98)
SERVICE CMNT-IMP: ABNORMAL
SODIUM SERPL-SCNC: 142 MMOL/L (ref 136–145)
SPECIMEN TYPE: ABNORMAL
TSH SERPL DL<=0.005 MIU/L-ACNC: 0.06 UIU/ML (ref 0.36–3.74)
WBC # BLD AUTO: 9.2 K/UL (ref 4.3–11.1)

## 2021-10-09 PROCEDURE — 93005 ELECTROCARDIOGRAM TRACING: CPT

## 2021-10-09 PROCEDURE — 36415 COLL VENOUS BLD VENIPUNCTURE: CPT

## 2021-10-09 PROCEDURE — 84443 ASSAY THYROID STIM HORMONE: CPT

## 2021-10-09 PROCEDURE — 74011250637 HC RX REV CODE- 250/637: Performed by: INTERNAL MEDICINE

## 2021-10-09 PROCEDURE — 74011250636 HC RX REV CODE- 250/636: Performed by: FAMILY MEDICINE

## 2021-10-09 PROCEDURE — 74011000250 HC RX REV CODE- 250: Performed by: INTERNAL MEDICINE

## 2021-10-09 PROCEDURE — 71045 X-RAY EXAM CHEST 1 VIEW: CPT

## 2021-10-09 PROCEDURE — 36600 WITHDRAWAL OF ARTERIAL BLOOD: CPT

## 2021-10-09 PROCEDURE — 83735 ASSAY OF MAGNESIUM: CPT

## 2021-10-09 PROCEDURE — 74011000250 HC RX REV CODE- 250: Performed by: FAMILY MEDICINE

## 2021-10-09 PROCEDURE — 74011250636 HC RX REV CODE- 250/636: Performed by: HOSPITALIST

## 2021-10-09 PROCEDURE — C1751 CATH, INF, PER/CENT/MIDLINE: HCPCS

## 2021-10-09 PROCEDURE — 84132 ASSAY OF SERUM POTASSIUM: CPT

## 2021-10-09 PROCEDURE — 85027 COMPLETE CBC AUTOMATED: CPT

## 2021-10-09 PROCEDURE — 74018 RADEX ABDOMEN 1 VIEW: CPT

## 2021-10-09 PROCEDURE — 74011250636 HC RX REV CODE- 250/636: Performed by: STUDENT IN AN ORGANIZED HEALTH CARE EDUCATION/TRAINING PROGRAM

## 2021-10-09 PROCEDURE — 36573 INSJ PICC RS&I 5 YR+: CPT | Performed by: INTERNAL MEDICINE

## 2021-10-09 PROCEDURE — 82803 BLOOD GASES ANY COMBINATION: CPT

## 2021-10-09 PROCEDURE — 74011250636 HC RX REV CODE- 250/636: Performed by: INTERNAL MEDICINE

## 2021-10-09 PROCEDURE — 80048 BASIC METABOLIC PNL TOTAL CA: CPT

## 2021-10-09 PROCEDURE — 65270000029 HC RM PRIVATE

## 2021-10-09 PROCEDURE — 74011000258 HC RX REV CODE- 258: Performed by: STUDENT IN AN ORGANIZED HEALTH CARE EDUCATION/TRAINING PROGRAM

## 2021-10-09 PROCEDURE — 02HV33Z INSERTION OF INFUSION DEVICE INTO SUPERIOR VENA CAVA, PERCUTANEOUS APPROACH: ICD-10-PCS | Performed by: INTERNAL MEDICINE

## 2021-10-09 PROCEDURE — 77010033678 HC OXYGEN DAILY

## 2021-10-09 RX ORDER — AMLODIPINE BESYLATE 10 MG/1
10 TABLET ORAL DAILY
Status: DISCONTINUED | OUTPATIENT
Start: 2021-10-09 | End: 2021-10-19

## 2021-10-09 RX ORDER — LEVOTHYROXINE SODIUM 112 UG/1
112 TABLET ORAL
Status: DISCONTINUED | OUTPATIENT
Start: 2021-10-10 | End: 2021-10-18

## 2021-10-09 RX ORDER — METOPROLOL TARTRATE 5 MG/5ML
5 INJECTION INTRAVENOUS ONCE
Status: COMPLETED | OUTPATIENT
Start: 2021-10-09 | End: 2021-10-09

## 2021-10-09 RX ORDER — CLONIDINE 0.3 MG/24H
1 PATCH, EXTENDED RELEASE TRANSDERMAL
Status: DISCONTINUED | OUTPATIENT
Start: 2021-10-09 | End: 2021-10-19

## 2021-10-09 RX ORDER — HYDRALAZINE HYDROCHLORIDE 20 MG/ML
15 INJECTION INTRAMUSCULAR; INTRAVENOUS
Status: DISCONTINUED | OUTPATIENT
Start: 2021-10-09 | End: 2021-10-22 | Stop reason: HOSPADM

## 2021-10-09 RX ORDER — SODIUM CHLORIDE 0.9 % (FLUSH) 0.9 %
10 SYRINGE (ML) INJECTION EVERY 8 HOURS
Status: DISCONTINUED | OUTPATIENT
Start: 2021-10-09 | End: 2021-10-22 | Stop reason: HOSPADM

## 2021-10-09 RX ORDER — CARVEDILOL 6.25 MG/1
6.25 TABLET ORAL 2 TIMES DAILY WITH MEALS
Status: DISCONTINUED | OUTPATIENT
Start: 2021-10-09 | End: 2021-10-19

## 2021-10-09 RX ORDER — POTASSIUM CHLORIDE 14.9 MG/ML
20 INJECTION INTRAVENOUS
Status: COMPLETED | OUTPATIENT
Start: 2021-10-09 | End: 2021-10-10

## 2021-10-09 RX ORDER — KETOROLAC TROMETHAMINE 15 MG/ML
15 INJECTION, SOLUTION INTRAMUSCULAR; INTRAVENOUS ONCE
Status: COMPLETED | OUTPATIENT
Start: 2021-10-09 | End: 2021-10-09

## 2021-10-09 RX ORDER — SODIUM CHLORIDE 9 MG/ML
50 INJECTION, SOLUTION INTRAVENOUS CONTINUOUS
Status: DISCONTINUED | OUTPATIENT
Start: 2021-10-09 | End: 2021-10-11

## 2021-10-09 RX ADMIN — LEVETIRACETAM 500 MG: 100 INJECTION, SOLUTION INTRAVENOUS at 05:11

## 2021-10-09 RX ADMIN — FUROSEMIDE 40 MG: 10 INJECTION, SOLUTION INTRAMUSCULAR; INTRAVENOUS at 09:23

## 2021-10-09 RX ADMIN — METOPROLOL TARTRATE 5 MG: 5 INJECTION INTRAVENOUS at 14:34

## 2021-10-09 RX ADMIN — Medication 10 ML: at 05:12

## 2021-10-09 RX ADMIN — HYDRALAZINE HYDROCHLORIDE 15 MG: 20 INJECTION INTRAMUSCULAR; INTRAVENOUS at 13:21

## 2021-10-09 RX ADMIN — Medication 30 ML: at 21:01

## 2021-10-09 RX ADMIN — SODIUM CHLORIDE 500 ML: 900 INJECTION, SOLUTION INTRAVENOUS at 21:46

## 2021-10-09 RX ADMIN — Medication 10 ML: at 13:22

## 2021-10-09 RX ADMIN — FUROSEMIDE 40 MG: 10 INJECTION, SOLUTION INTRAMUSCULAR; INTRAVENOUS at 20:57

## 2021-10-09 RX ADMIN — METOPROLOL TARTRATE 5 MG: 5 INJECTION INTRAVENOUS at 20:13

## 2021-10-09 RX ADMIN — METOPROLOL TARTRATE 5 MG: 5 INJECTION INTRAVENOUS at 19:09

## 2021-10-09 RX ADMIN — LABETALOL HYDROCHLORIDE 10 MG: 5 INJECTION INTRAVENOUS at 07:20

## 2021-10-09 RX ADMIN — POTASSIUM CHLORIDE 20 MEQ: 14.9 INJECTION, SOLUTION INTRAVENOUS at 23:09

## 2021-10-09 RX ADMIN — HYDRALAZINE HYDROCHLORIDE 15 MG: 20 INJECTION INTRAMUSCULAR; INTRAVENOUS at 09:23

## 2021-10-09 RX ADMIN — METOPROLOL TARTRATE 5 MG: 5 INJECTION INTRAVENOUS at 18:25

## 2021-10-09 RX ADMIN — LABETALOL HYDROCHLORIDE 10 MG: 5 INJECTION INTRAVENOUS at 00:24

## 2021-10-09 RX ADMIN — HYDRALAZINE HYDROCHLORIDE 15 MG: 20 INJECTION INTRAMUSCULAR; INTRAVENOUS at 02:32

## 2021-10-09 RX ADMIN — KETOROLAC TROMETHAMINE 15 MG: 15 INJECTION, SOLUTION INTRAMUSCULAR; INTRAVENOUS at 20:13

## 2021-10-09 RX ADMIN — SODIUM CHLORIDE 50 ML/HR: 900 INJECTION, SOLUTION INTRAVENOUS at 22:17

## 2021-10-09 NOTE — PROGRESS NOTES
Bedside shift change report given to Amado Diamond RN (oncoming nurse) by Reuben Nichole RN (offgoing nurse). Report included the following information SBAR, Kardex, ED Summary, Procedure Summary, Intake/Output, MAR, Recent Results and Cardiac Rhythm SR/ST.

## 2021-10-09 NOTE — PROGRESS NOTES
Unable to successfully place NGT, NGT keeps coiling in patient's mouth and will not advance. Placement attempted by multiple RNs. Notified Dr. Kaylee Burnette MD to place orders to manage patient's BP.  Will hold on transfer for now to control patient's BP.

## 2021-10-09 NOTE — PROGRESS NOTES
Hospitalist Progress Note   Admit Date:  10/3/2021  2:16 AM   Name:  Avelino Osborne   Age:  78 y.o. Sex:  female  :  1940   MRN:  [id number]   Room:  [facility]/    Presenting Complaint: Headache    Reason(s) for Admission: Intracranial bleed ([facility] [address] [I62.9]  Dementia ([facility] [address] [F03.90]  Poorly-controlled hypertension [I10]  Hypothyroidism [E03.9]  Encephalopathy, unspecified [G93.40]     Hospital Course & Interval History:   Mrs. Siomara Morales is an 79 y/o WF with a h/o HTN, dementia, hypothyroidism who presented to ER on 10/3 with c/o headache and confusion. CT head showed R temporal IPH around 2 cm. 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 Evaluated by Neurosurgery evaluated who did not feel her bleed was related to the aneurysm. Admitted to ICU for BP control and frequent Neuro checks. Code S was called overnight 10/3 for severe headache associated with nausea and dry heaving. She was started on mannitol and Cardene to reduce intracranial pressure. Stat CT head showed no new changes. Has had intermittent delirium and agitation and started on Depakote and Seroquel. CT head 10/5 unchanged, now off hypertonic saline and mannitol.  Meds adjusted due to over sedation on 10/5 (Seroquel dose halved, Depakote held). This improved but she became more agitated on 10/6 so Depakote was unheld. Repeat head CT 10/8 again unchanged. Subjective (10/09/21):  Remains delirious and in mitts. Unable to get ROS. Spoke to her daughter, Stephany Raya, this morning and updated her. We discussed nutrition since patient not alert enough to take pills or participate in swallow evaluation. Ok for NGT, TFs and change BP meds to per tube. She has a bed on 7 and will transfer later today. [id number] [address] for family to visit once on 7 to see if it will help with her ongoing delirium. Assessment & Plan:   # Acute metabolic encephalopathy              - Ongoing.  Multifactorial from hospitalization/ICU, acute hemorrhage in the setting of underlying dementia. Completed ceftriaxone for cystitis, on 10/7.    - Seroquel, Depakote and Keppra held. Daughter ok with NGT, will place this AM, start TFs and change BP meds to tube.     # HTN              - Off Cardene. NGT to be placed today and will give Norvasc per tube, Coreg also added. IV PRNs available. # Acute hypoxemic respiratory failure              - 2/2 volume overload. Off IVFs, good uop with IV Lasix. Watch Cr. O2 needs improved.     # AFib RVR              - Resolved. No AC with ICH.    # Acute R temporal lobe IPH              - Appreciate Neurosurgery help, non-surgical management. CTA showed aneurysm but not in area of her hemorrhage. Repeat CT 10/5 and 10/8 were unchanged.      # Acute cystitis              - Pan-sensitive klebsiella, ceftriaxone EOT 10/7.     # CKD   - Baseline. # Dementia    Dispo/Discharge Planning: Transfer to 7th floor today. D/w daughter, Kristy Sullivan, today -- [phone]. Diet:  DIET NPO  DVT PPx: SCDs only.   Code status: Full Code    Hospital Problems as of 10/9/2021 Never Reviewed        Codes Class Noted - Resolved POA    Encephalopathy, unspecified ICD-10-CM: G93.40  ICD-9-CM: 348.30  10/3/2021 - Present Unknown        Dementia ([facility] [address] ICD-10-CM: F03.90  ICD-9-CM: 294.20  10/3/2021 - Present Unknown        * (Principal) Intracranial bleed ([facility] [address] ICD-10-CM: I62.9  ICD-9-CM: 432.9  10/3/2021 - Present Unknown        Poorly-controlled hypertension ICD-10-CM: I10  ICD-9-CM: 401.9  10/3/2021 - Present Unknown        Hypothyroidism ICD-10-CM: E03.9  ICD-9-CM: 244.9  6/29/2012 - Present Unknown              Objective:     Patient Vitals for the past 24 hrs:   Temp Pulse Resp BP SpO2   10/09/21 0802  87 28 (!) 172/91 96 %   10/09/21 0746  82 25 (!) 162/95 96 %   10/09/21 0732  80 (!) 31 (!) 167/85 95 %   10/09/21 0705 99.5 °F (37.5 °C) 97 22 (!) 169/86 96 %   10/09/21 0602  89 (!) 32 (!) 165/82 95 % 10/09/21 0420 98.3 °F (36.8 °C)       10/09/21 0418  87 18 (!) 168/81 95 %   10/09/21 0402  93 21 (!) 174/80 94 %   10/09/21 0331  97 (!) 32 (!) 173/82 94 %   10/09/21 0302  91 27 (!) 165/80 96 %   10/09/21 0232  80  (!) 189/88    10/09/21 0231  79 23 (!) 189/88 96 %   10/09/21 0202  79 (!) 44 (!) 177/87 96 %   10/09/21 0132  72 22 (!) 172/87 96 %   10/09/21 0102  69 28 (!) 163/86 96 %   10/09/21 0032  66 23 (!) 151/77 95 %   10/09/21 0028  69 (!) 31 (!) 138/92 95 %   10/09/21 0024  79  (!) 163/78    10/09/21 0014 98.5 °F (36.9 °C)       10/09/21 0003  69 22 (!) 147/71 97 %   10/08/21 2302  62 14 (!) 109/54 98 %   10/08/21 2202  65 13 (!) 97/53 96 %   10/08/21 2102  86 (!) 77 (!) 140/77 95 %   10/08/21 2100 97.9 °F (36.6 °C)       10/08/21 2002  88 (!) 33 (!) 146/87 94 %   10/08/21 1913  92 30 (!) 172/75 93 %   10/08/21 1900  96 (!) 37 (!) 172/75 94 %   10/08/21 1845  88 25  94 %   10/08/21 1830  93   94 %   10/08/21 1815  77 (!) 33  94 %   10/08/21 1800  75 26 (!) 161/83 94 %   10/08/21 1745  73 27  95 %   10/08/21 1730  67 28  95 %   10/08/21 1715  63 15  96 %   10/08/21 1700  65 (!) 53 (!) 153/68 95 %   10/08/21 1645  79 (!) 34  93 %   10/08/21 1630  68 18  94 %   10/08/21 1615  79 (!) 34  94 %   10/08/21 1600  80 22 (!) 161/79 93 %   10/08/21 1545  78 20  94 %   10/08/21 1530  81 29  93 %   10/08/21 1515  83 (!) 32  92 %   10/08/21 1500 98.9 °F (37.2 °C) 84 30 (!) 163/107 95 %   10/08/21 1445  81 (!) 31  95 %   10/08/21 1430  80 (!) 51  95 %   10/08/21 1415  80 26  94 %   10/08/21 1400  77 (!) 31 (!) 171/80 94 %   10/08/21 1345  75 (!) 33  93 %   10/08/21 1330  75 (!) 35  92 %   10/08/21 1315  81 30  92 %   10/08/21 1300  83 (!) 31 (!) 167/82 92 %   10/08/21 1245  86 30  92 %   10/08/21 1230  85 29  92 %   10/08/21 1215  87 (!) 31  91 %   10/08/21 1200  91 (!) 32 (!) 177/90 90 %   10/08/21 1145  90 29  92 %   10/08/21 1100 97.5 °F (36.4 °C) 78 (!) 33 (!) 170/95 93 %   10/08/21 1045  77 26  94 %   10/08/21 1030  76 (!) 51 (!) 179/87 93 %   10/08/21 1015  74 (!) 34  94 %   10/08/21 1000  78 28 (!) 172/85 96 %   10/08/21 0945  77 (!) 41  95 %   10/08/21 0930  71 (!) 46 (!) 166/85 95 %   10/08/21 0915  76 27  95 %   10/08/21 0900  77 (!) 44 (!) 165/84 95 %     Oxygen Therapy  O2 Sat (%): 96 % (10/09/21 0802)  Pulse via Oximetry: 86 beats per minute (10/09/21 0802)  O2 Device: Nasal cannula (10/09/21 0705)  Skin Assessment: Clean, dry, & intact (10/08/21 2100)  Skin Protection for O2 Device: N/A (10/03/21 2001)  O2 Flow Rate (L/min): 3 l/min (10/08/21 2100)  O2 Temperature: 35.6 °F (2 °C) (10/09/21 0705)    Estimated body mass index is 24.58 kg/m² as calculated from the following:    Height as of this encounter: 5' 5\" (1.651 m). Weight as of this encounter: 67 kg (147 lb 11.3 oz). Intake/Output Summary (Last 24 hours) at 10/9/2021 0845  Last data filed at 10/9/2021 0511  Gross per 24 hour   Intake 306.92 ml   Output 3300 ml   Net -2993.08 ml         Physical Exam:   General:    Restless, will open eyes to voice but falls asleep quickly. Head:  Normocephalic, atraumatic. Eyes:  Sclerae appear normal.  Pupils equally round. ENT:  Nares appear normal, no drainage. Moist oral mucosa  Neck:  No restricted ROM. Trachea midline . CV:   RRR. No m/r/g. No jugular venous distension. Lungs:   Mild rales BB anteriorly. 3L NC O2. Abdomen: Bowel sounds present. Soft, nontender, nondistended. Extremities: No cyanosis or clubbing. No edema  Skin:     No rashes and normal coloration. Warm and dry. Neuro:  Unable to assess. Psych:  As above.     I have reviewed ordered lab tests and independently visualized imaging below:    Last 24hr Labs:  Recent Results (from the past 24 hour(s))   POTASSIUM    Collection Time: 10/08/21  5:49 PM   Result Value Ref Range    Potassium 3.5 3.5 - 5.1 mmol/L   METABOLIC PANEL, BASIC Collection Time: 10/09/21  5:57 AM   Result Value Ref Range    Sodium 142 136 - 145 mmol/L    Potassium 3.4 (L) 3.5 - 5.1 mmol/L    Chloride 104 98 - 107 mmol/L    CO2 28 21 - 32 mmol/L    Anion gap 10 7 - 16 mmol/L    Glucose 103 (H) 65 - 100 mg/dL    BUN 46 (H) 8 - 23 MG/DL    Creatinine 1.59 (H) 0.6 - 1.0 MG/DL    GFR est AA 40 (L) >60 ml/min/1.73m2    GFR est non-AA 33 (L) >60 ml/min/1.73m2    Calcium 9.5 8.3 - 10.4 MG/DL       All Micro Results     Procedure Component Value Units Date/Time    CULTURE, URINE [[id number]]  (Abnormal)  (Susceptibility) Collected: 10/04/21 2107    Order Status: Completed Specimen: Cath Urine Updated: 10/07/21 0749     Special Requests: NO SPECIAL REQUESTS        Culture result:       >100,000 COLONIES/mL KLEBSIELLA PNEUMONIAE                Other Studies:  CT HEAD WO CONT    Result Date: 10/8/2021  EXAMINATION: CT HEAD WO CONT 10/8/2021 11:28 AM COMPARISON: 10/05/2021  INDICATION: Known R temporal lobe hemorrhage, ongoing somnolence TECHNIQUE: Multiple-row detector helical CT examination of the head without intravenous contrast. There is significant motion artifact. Radiation dose reduction techniques were used for this study. Our CT scanners use one or all of the following: Automated exposure control, adjustment of the mA and/or kV according to patient size, iterative reconstruction. FINDINGS: Brain: Again seen is parenchymal hemorrhage in the right temporal lobe. This does not appear significant changed from the comparison exam. There is adjacent edema. There is no subarachnoid blood. There is no midline shift. There is no mass. Hypodensity is seen in the periventricular and deep white matter. Ventricles: There is prominence of the ventricles with concomitant sulcal enlargement. Vasculature:  Calcifications are seen in the intracranial internal carotid arteries. Bones: Normal Surrounding soft tissues:  Normal     1.   Exam is somewhat limited by patient motion however the known right temporal hemorrhage appears stable. There are no new findings. 2.  Senescent changes       Current Meds:  Current Facility-Administered Medications   Medication Dose Route Frequency    [START ON 10/10/2021] levothyroxine (SYNTHROID) tablet 112 mcg  112 mcg Per G Tube ACB    amLODIPine (NORVASC) tablet 10 mg  10 mg Per G Tube DAILY    carvediloL (COREG) tablet 6.25 mg  6.25 mg Per G Tube BID WITH MEALS    furosemide (LASIX) injection 40 mg  40 mg IntraVENous BID    tuberculin injection 5 Units  5 Units IntraDERMal ONCE    [Held by provider] QUEtiapine (SEROquel) tablet 12.5 mg  12.5 mg Oral QHS    acetaminophen (TYLENOL) tablet 650 mg  650 mg Oral Q4H PRN    [Held by provider] levETIRAcetam (KEPPRA) 500 mg in 0.9% sodium chloride (MBP/ADV) 100 mL MBP  500 mg IntraVENous Q12H    [Held by provider] divalproex (DEPAKOTE SPRINKLE) capsule 125 mg  125 mg Oral Q12H    LORazepam (ATIVAN) injection 1 mg  1 mg IntraVENous Q4H PRN    sodium chloride (NS) flush 5-40 mL  5-40 mL IntraVENous Q8H    sodium chloride (NS) flush 5-40 mL  5-40 mL IntraVENous PRN    labetaloL (NORMODYNE;TRANDATE) injection 10 mg  10 mg IntraVENous Q4H PRN    hydrALAZINE (APRESOLINE) 20 mg/mL injection 15 mg  15 mg IntraVENous Q6H PRN    ondansetron (ZOFRAN) injection 8 mg  8 mg IntraVENous Q8H PRN       Signed:  Miranda Moran MD    Part of this note may have been written by using a voice dictation software. The note has been proof read but may still contain some grammatical/other typographical errors.

## 2021-10-09 NOTE — PROGRESS NOTES
PICC Placement Note    PRE-PROCEDURE VERIFICATION  Correct Procedure: yes. Risks and benefits reviewed with patient's daughter who is present at bedside and informed consent obtained prior to assessment and procedure. Time out completed at 16:45 with assistant Juju Miles RN, [facility], in room and in agreement with procedure and time out. PICC line was approved by Dr. Franky Myers, nephrologist, message received via evOLED. Correct Site:  yes  Temperature: Temp: 98 °F (36.7 °C), Temperature Source: Temp Source: Axillary  Recent Labs     10/09/21  0557   BUN 46*   CREA 1.59*     Allergies: Patient has no known allergies. Education materials for [facility] given to patient or family. PROCEDURE DETAIL    A triple lumen PICC line was started for Hemodynamically Unstable, Requiring Monitoring, Vasopressors or Volume Resuscitation and Limited/No vessel Suitable for conventional peripheral access (primary care RN reports multiple attempts by several RN's to establish IV access without success). The following documentation is in addition to the PICC properties in the lines/airways flowsheet :  Lot #: [id number]  xylocaine used: yes  Mid-Arm Circumference: 28 (cm)  Internal Catheter Length: 30 (cm)  External Catheter Length: 5 (cm)  Total Catheter Length: 35 (cm)  Vein Selection for PICC: Right brachial  Central Line Bundle followed yes  Both the insertion guidewire and sherlock guidewire were removed intact all ports have positive blood return and were flushed well with normal saline. Complication Related to Insertion: insertion complete at ~17:15 Patient in Afib with RVR, rate 130-160's, unable to visualize any P waves, Chest xray order to confirm placement, see chest xray report \"There is a right PICC with tip projecting over right atrium. \", discussed placement with Dr. Jase Isaac, radiologist, [phone], advised pulling PICC back 5 cm to be in the distal 1/3 of the superior vena cava, line pulled back to 5cm at 18:45. Both the insertion guidewire and sherlock guidewire were removed intact all ports have positive blood return and were flushed well with normal saline.     Line is okay to use: yes    Cynthia Lopez RN

## 2021-10-09 NOTE — PROGRESS NOTES
Patient has been hypertensive with SBP>140 despite PRN medications. Notified Dr. Collette Haddock, MD stated he would come see patient shortly.

## 2021-10-09 NOTE — PROGRESS NOTES
Acknowledge consult for TF. Spoke with Moses Mendoza RN who reports NGT placement unsuccessful times several attempts. Also has now lost IV access. May need PICC. Will reassess tomorrow for nutrition intervention.     [address] [place], LD on 10/9/2021 at 4:41 PM  Contact: [phone]

## 2021-10-09 NOTE — PROGRESS NOTES
Bedside shift change report given to Masoud Schultz RN (oncoming nurse) by Mik Gomez RN (offgoing nurse). Report included the following information SBAR, Kardex, ED Summary, Procedure Summary, Intake/Output, MAR, Recent Results and Cardiac Rhythm a-fib.     Dual neuro assessment completed  Patient turned and repositioned

## 2021-10-09 NOTE — PROGRESS NOTES
Paged Dr. Jaiden Fowler for regarding HR following Metoprolol IVP.  HR continues afib RVR (130-140s rate) with occasional NSR noted on the monitor - see orders for intervention

## 2021-10-09 NOTE — PROGRESS NOTES
Patient's HR increased to 150s to 160s. Notified Dr. Shaye Camp, orders received for IV lopressor 5 mg.

## 2021-10-10 ENCOUNTER — APPOINTMENT (OUTPATIENT)
Dept: GENERAL RADIOLOGY | Age: 81
DRG: 064 | End: 2021-10-10
Attending: EMERGENCY MEDICINE
Payer: MEDICARE

## 2021-10-10 PROBLEM — I61.1 HEMORRHAGE OF RIGHT TEMPORAL LOBE (HCC): Status: ACTIVE | Noted: 2021-10-10

## 2021-10-10 PROBLEM — N17.9 AKI (ACUTE KIDNEY INJURY) (HCC): Status: ACTIVE | Noted: 2021-10-10

## 2021-10-10 PROBLEM — G93.41 ACUTE METABOLIC ENCEPHALOPATHY: Status: ACTIVE | Noted: 2021-10-10

## 2021-10-10 PROBLEM — I48.91 ATRIAL FIBRILLATION WITH RVR (HCC): Status: ACTIVE | Noted: 2021-10-10

## 2021-10-10 LAB
ANION GAP SERPL CALC-SCNC: 6 MMOL/L (ref 7–16)
ATRIAL RATE: 131 BPM
BUN SERPL-MCNC: 70 MG/DL (ref 8–23)
CALCIUM SERPL-MCNC: 9.2 MG/DL (ref 8.3–10.4)
CALCULATED R AXIS, ECG10: -38 DEGREES
CALCULATED T AXIS, ECG11: 52 DEGREES
CHLORIDE SERPL-SCNC: 106 MMOL/L (ref 98–107)
CO2 SERPL-SCNC: 32 MMOL/L (ref 21–32)
CREAT SERPL-MCNC: 2.12 MG/DL (ref 0.6–1)
DIAGNOSIS, 93000: NORMAL
GLUCOSE SERPL-MCNC: 104 MG/DL (ref 65–100)
MAGNESIUM SERPL-MCNC: 2.4 MG/DL (ref 1.8–2.4)
POTASSIUM SERPL-SCNC: 3.9 MMOL/L (ref 3.5–5.1)
Q-T INTERVAL, ECG07: 328 MS
QRS DURATION, ECG06: 102 MS
QTC CALCULATION (BEZET), ECG08: 492 MS
SODIUM SERPL-SCNC: 144 MMOL/L (ref 136–145)
VENTRICULAR RATE, ECG03: 135 BPM

## 2021-10-10 PROCEDURE — 74011000250 HC RX REV CODE- 250: Performed by: FAMILY MEDICINE

## 2021-10-10 PROCEDURE — 80048 BASIC METABOLIC PNL TOTAL CA: CPT

## 2021-10-10 PROCEDURE — 74018 RADEX ABDOMEN 1 VIEW: CPT

## 2021-10-10 PROCEDURE — 74011250636 HC RX REV CODE- 250/636: Performed by: FAMILY MEDICINE

## 2021-10-10 PROCEDURE — 74011250636 HC RX REV CODE- 250/636: Performed by: INTERNAL MEDICINE

## 2021-10-10 PROCEDURE — 2709999900 HC NON-CHARGEABLE SUPPLY

## 2021-10-10 PROCEDURE — 74011250636 HC RX REV CODE- 250/636: Performed by: EMERGENCY MEDICINE

## 2021-10-10 PROCEDURE — 74011000636 HC RX REV CODE- 636: Performed by: EMERGENCY MEDICINE

## 2021-10-10 PROCEDURE — 83735 ASSAY OF MAGNESIUM: CPT

## 2021-10-10 PROCEDURE — 65270000029 HC RM PRIVATE

## 2021-10-10 RX ORDER — METOPROLOL TARTRATE 5 MG/5ML
5 INJECTION INTRAVENOUS ONCE
Status: COMPLETED | OUTPATIENT
Start: 2021-10-10 | End: 2021-10-10

## 2021-10-10 RX ORDER — MORPHINE SULFATE 2 MG/ML
2 INJECTION, SOLUTION INTRAMUSCULAR; INTRAVENOUS ONCE
Status: COMPLETED | OUTPATIENT
Start: 2021-10-10 | End: 2021-10-10

## 2021-10-10 RX ADMIN — ONDANSETRON 8 MG: 2 INJECTION INTRAMUSCULAR; INTRAVENOUS at 02:44

## 2021-10-10 RX ADMIN — LORAZEPAM 1 MG: 2 INJECTION INTRAMUSCULAR; INTRAVENOUS at 21:05

## 2021-10-10 RX ADMIN — SODIUM CHLORIDE 50 ML/HR: 900 INJECTION, SOLUTION INTRAVENOUS at 11:27

## 2021-10-10 RX ADMIN — HYDRALAZINE HYDROCHLORIDE 15 MG: 20 INJECTION INTRAMUSCULAR; INTRAVENOUS at 13:50

## 2021-10-10 RX ADMIN — Medication 10 ML: at 13:51

## 2021-10-10 RX ADMIN — MORPHINE SULFATE 2 MG: 2 INJECTION, SOLUTION INTRAMUSCULAR; INTRAVENOUS at 02:43

## 2021-10-10 RX ADMIN — Medication 10 ML: at 05:13

## 2021-10-10 RX ADMIN — METOPROLOL TARTRATE 5 MG: 5 INJECTION INTRAVENOUS at 21:44

## 2021-10-10 RX ADMIN — POTASSIUM CHLORIDE 20 MEQ: 14.9 INJECTION, SOLUTION INTRAVENOUS at 01:12

## 2021-10-10 RX ADMIN — Medication 10 ML: at 20:59

## 2021-10-10 RX ADMIN — HYDRALAZINE HYDROCHLORIDE 15 MG: 20 INJECTION INTRAMUSCULAR; INTRAVENOUS at 07:40

## 2021-10-10 RX ADMIN — DIATRIZOATE MEGLUMINE AND DIATRIZOATE SODIUM 15 ML: 660; 100 LIQUID ORAL; RECTAL at 03:35

## 2021-10-10 NOTE — PROGRESS NOTES
Hospitalist Progress Note   Admit Date:  10/3/2021  2:16 AM   Name:  Nils Chamberlain   Age:  78 y.o. Sex:  female  :  1940   MRN:  [id number]   Room:  [facility]/01    Presenting Complaint: Headache    Reason(s) for Admission: Intracranial bleed ([facility] [I62.9]  Dementia ([facility] [F03.90]  Poorly-controlled hypertension [I10]  Hypothyroidism [E03.9]  Encephalopathy, unspecified [G93.40]     Hospital Course & Interval History:   Mrs. Nuno Ocasio is an 79 y/o WF with a h/o HTN, dementia, hypothyroidism who presented to ER on 10/3 with c/o headache and confusion. CT head showed R temporal IPH around 2 cm. 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 Evaluated by Neurosurgery evaluated who did not feel her bleed was related to the aneurysm. Admitted to ICU for BP control and frequent Neuro checks. Code S was called overnight 10/3 for severe headache associated with nausea and dry heaving. She was started on mannitol and Cardene to reduce intracranial pressure. Stat CT head showed no new changes. Has had intermittent delirium and agitation and started on Depakote and Seroquel. CT head 10/5 unchanged, now off hypertonic saline and mannitol.  Meds adjusted due to over sedation on 10/5 (Seroquel dose halved, Depakote held). This improved but she became more agitated on 10/6 so Depakote was unheld. Repeat head CT 10/8 again unchanged. NGT attempted 10/9 but unable to be placed appropriately. Subjective (10/10/21): A little restless this morning. She was able to squeeze my hand, stick her tongue out and slightly open her eyes to command, though still seems disoriented. Had AFib RVR overnight, now resolved. SBP during my evaluation was 132. NGT attempted overnight but is coiled in the mid chest based on AXR so told RN to remove since unable to use it. Unable to get ROS due to patient condition.     Assessment & Plan:   # Acute metabolic encephalopathy              - Ongoing. Multifactorial from hospitalization/ICU, acute hemorrhage in the setting of underlying dementia. Completed ceftriaxone for cystitis, on 10/7.               - Seroquel, Depakote and Keppra held. - Mentation seems a little better today, following some simple commands. Unable to get NGT placed after several attempts. Will likely need to get done in IR with fluoro tomorrow.      # HTN              - Off Cardene. Unable to get NGT for PO meds despite multiple attempts. Has clonidine patch, con't IV PRNs    # LEODAN on CKD              - Hold Lasix, now back on gentle IVFs. Monitor since last time she developed volume overload after ongoing IVFs. # Acute hypoxemic respiratory failure              - 2/2 volume overload. Was on IV Lasix with good uop, now Lasix held and on gentle fluids with LEODAN today.     # AFib RVR              - Had another run overnight, now resolved again. No AC with temporal lobe hemorrhage. Would start PO BB as ordered if can get NGT.      # Acute R temporal lobe IPH              - Appreciate Neurosurgery help, non-surgical management. CTA showed aneurysm but not in area of her hemorrhage. Repeat CT 10/5 and 10/8 were unchanged.      # Acute cystitis              - Pan-sensitive klebsiella, ceftriaxone EOT 10/7.     # Dementia    Dispo/Discharge Planning: Pending, possibly attempt to floor again today pending BPs/HRs. Daughter, Laura Granger -- [phone]. Diet:  DIET NPO  DVT PPx: SCDs only with ICH.   Code status: Full Code    Hospital Problems as of 10/10/2021 Never Reviewed        Codes Class Noted - Resolved POA    [facility]ODAN (acute kidney injury) [facility] ICD-10-CM: N17.9  ICD-9-CM: 584.9  10/10/2021 - Present No        Acute metabolic encephalopathy XQU-15-QT: G93.41  ICD-9-CM: 348.31  10/10/2021 - Present Yes        * (Principal) Hemorrhage of right temporal lobe ([facility] ICD-10-CM: I61.1  ICD-9-CM: 680  10/10/2021 - Present Yes        Atrial fibrillation with RVR [facility]) ICD-10-CM: I48.91  ICD-9-CM: 427.31  10/10/2021 - Present Unknown        Encephalopathy, unspecified ICD-10-CM: G93.40  ICD-9-CM: 348.30  10/3/2021 - Present Yes        Dementia ([facility] ICD-10-CM: F03.90  ICD-9-CM: 294.20  10/3/2021 - Present Yes        Poorly-controlled hypertension ICD-10-CM: I10  ICD-9-CM: 401.9  10/3/2021 - Present Yes        Hypothyroidism ICD-10-CM: E03.9  ICD-9-CM: 244.9  6/29/2012 - Present Yes              Objective:     Patient Vitals for the past 24 hrs:   Temp Pulse Resp BP SpO2   10/10/21 0812  89  (!) 151/74 95 %   10/10/21 0802  90 20 (!) 150/75 96 %   10/10/21 0723  79  (!) 155/77 95 %   10/10/21 0702  80 19 (!) 160/77 97 %   10/10/21 0630  68 21 127/64 98 %   10/10/21 0600  67 17 (!) 109/55 98 %   10/10/21 0530  64 20 (!) 114/57 98 %   10/10/21 0500  64 15 (!) 105/53 98 %   10/10/21 0430  63 27 (!) 109/51 98 %   10/10/21 0400  69 25 (!) 125/52 98 %   10/10/21 0330  65 9 (!) 112/56 98 %   10/10/21 0300  64 25 (!) 126/55 97 %   10/10/21 0258 97.6 °F (36.4 °C)       10/10/21 0230  77 18 (!) 157/86 98 %   10/10/21 0215  76 (!) 35 (!) 160/73 98 %   10/10/21 0200  81 29 (!) 152/78 97 %   10/10/21 0130  78 19 124/63 94 %   10/10/21 0100  77 29 (!) 145/74 95 %   10/10/21 0030  80 22 (!) 142/60 94 %   10/10/21 0000  (!) 135 (!) 35 118/78 96 %   10/09/21 2330  (!) 133 (!) 31 114/89 97 %   10/09/21 2300 97.4 °F (36.3 °C) (!) 130 12 (!) 91/51 97 %   10/09/21 2255     97 %   10/09/21 2230  (!) 148 (!) 38 (!) 119/95 99 %   10/09/21 2215  (!) 139 26 (!) 135/97 99 %   10/09/21 2145  (!) 126 20 132/89 97 %   10/09/21 2130  (!) 128 15 (!) 87/57 97 %   10/09/21 2115  (!) 128 15 95/65 97 %   10/09/21 2030  (!) 140 (!) 54 (!) 135/95 97 %   10/09/21 1958  (!) 118      10/09/21 1930  (!) 140 (!) 68 92/82 96 %   10/09/21 1901 97.4 °F (36.3 °C) (!) 137 27 102/71 95 %   10/09/21 1845  (!) 136 26 (!) 83/52 97 %   10/09/21 1843  (!) 130 15  97 %   10/09/21 1817  (!) 139 29 (!) 82/64 97 %   10/09/21 1747  (!) 141 29 96/63 96 %   10/09/21 1732  (!) 141 29 100/77 96 %   10/09/21 1717  (!) 151 23 (!) 81/49 97 %   10/09/21 1641  (!) 144 15 101/66 96 %   10/09/21 1617  (!) 151 18 (!) 91/58 95 %   10/09/21 1602  (!) 135 26 108/81 96 %   10/09/21 1547  (!) 150 29 118/83 96 %   10/09/21 1545  (!) 144 18 103/79 96 %   10/09/21 1502 98 °F (36.7 °C) (!) 143 24 (!) 110/50 97 %   10/09/21 1452  (!) 148 22 116/72 96 %   10/09/21 1432  (!) 144 21 96/64 96 %   10/09/21 1403  (!) 110 28 (!) 171/89 95 %   10/09/21 1332  95  (!) 165/82 95 %   10/09/21 1302  95 24 (!) 160/91 95 %   10/09/21 1232  92 18 (!) 161/67 95 %   10/09/21 1202  82 21 (!) 151/69 95 %   10/09/21 1132  83 26 (!) 150/80 93 %   10/09/21 1103 97.7 °F (36.5 °C) 91 21 (!) 147/80 93 %   10/09/21 1032  92 20 (!) 155/71 93 %   10/09/21 1002  (!) 103 27 (!) 149/74 94 %   10/09/21 0932  73 29 (!) 118/57 96 %     Oxygen Therapy  O2 Sat (%): 95 % (10/10/21 0812)  Pulse via Oximetry: 89 beats per minute (10/10/21 0812)  O2 Device: Nasal cannula (10/09/21 2255)  Skin Assessment: Other (see comment/note) (no breakdown; mucosa dry) (10/09/21 1900)  Skin Protection for O2 Device: N/A (10/03/21 2001)  O2 Flow Rate (L/min): 3 l/min (10/09/21 3953)  O2 Temperature: 35.6 °F (2 °C) (10/09/21 0705)    Estimated body mass index is 26.34 kg/m² as calculated from the following:    Height as of this encounter: 5' 5\" (1.651 m). Weight as of this encounter: 71.8 kg (158 lb 4.6 oz). Intake/Output Summary (Last 24 hours) at 10/10/2021 0912  Last data filed at 10/10/2021 0500  Gross per 24 hour   Intake 870 ml   Output 1030 ml   Net -160 ml         Physical Exam:   Blood pressure (!) 151/74, pulse 89, temperature 97.6 °F (36.4 °C), resp. rate 20, height 5' 5\" (1.651 m), weight 71.8 kg (158 lb 4.6 oz), SpO2 95 %. General:    Restless but not agitated or combative.    Head:  Normocephalic, atraumatic  Eyes:  Sclerae appear normal. Pupils equally round. ENT:  Nares appear normal, no drainage. Dry oral mucosa  Neck:  No restricted ROM. Trachea midline   CV:   RRR. No m/r/g. No jugular venous distension. Lungs:   Decreased at bases anteriorly. 3L NC O2. Abdomen: Bowel sounds present. Soft, nontender, nondistended. Extremities: No cyanosis or clubbing. No edema  Skin:     No rashes and normal coloration. Warm and dry. Neuro:  Opens eyes, sticks tongue out and lightly squeezes my hand on command. Moves all extremities spontaneously. Still disoriented but appears improved and a little more interactive compared to yesterday. Psych:  As above.     I have reviewed ordered lab tests and independently visualized imaging below:    Last 24hr Labs:  Recent Results (from the past 24 hour(s))   PLEASE READ & DOCUMENT PPD TEST IN 24 HRS    Collection Time: 10/09/21  6:18 PM   Result Value Ref Range    PPD Negative Negative    mm Induration 0 0 - 5 mm   POTASSIUM    Collection Time: 10/09/21  9:35 PM   Result Value Ref Range    Potassium 3.4 (L) 3.5 - 5.1 mmol/L   MAGNESIUM    Collection Time: 10/09/21  9:35 PM   Result Value Ref Range    Magnesium 2.3 1.8 - 2.4 mg/dL   TSH 3RD GENERATION    Collection Time: 10/09/21  9:35 PM   Result Value Ref Range    TSH 0.058 (L) 0.358 - 3.740 uIU/mL   CBC W/O DIFF    Collection Time: 10/09/21 10:30 PM   Result Value Ref Range    WBC 9.2 4.3 - 11.1 K/uL    RBC 4.47 4.05 - 5.2 M/uL    HGB 14.0 11.7 - 15.4 g/dL    HCT 43.2 35.8 - 46.3 %    MCV 96.6 79.6 - 97.8 FL    MCH 31.3 26.1 - 32.9 PG    MCHC 32.4 31.4 - 35.0 g/dL    RDW 12.0 11.9 - 14.6 %    PLATELET 921 063 - 729 K/uL    MPV 11.5 9.4 - 12.3 FL    ABSOLUTE NRBC 0.00 0.0 - 0.2 K/uL   BLOOD GAS, ARTERIAL POC    Collection Time: 10/09/21 10:57 PM   Result Value Ref Range    Device: NASAL CANNULA      pH (POC) 7.37 7.35 - 7.45      pCO2 (POC) 47.8 (H) 35 - 45 MMHG    pO2 (POC) 179 (H) 75 - 100 MMHG    HCO3 (POC) 27.8 (H) 22 - 26 MMOL/L    sO2 (POC) 99.5 (H) 95 - 98 %    Base excess (POC) 1.9 mmol/L    Allens test (POC) Positive      Site RIGHT RADIAL      Specimen type (POC) ARTERIAL      Performed by Harrison     FIO2, L/min 3     EKG, 12 LEAD, INITIAL    Collection Time: 10/09/21 11:19 PM   Result Value Ref Range    Ventricular Rate 135 BPM    Atrial Rate 131 BPM    QRS Duration 102 ms    Q-T Interval 328 ms    QTC Calculation (Bezet) 492 ms    Calculated R Axis -38 degrees    Calculated T Axis 52 degrees    Diagnosis       Atrial fibrillation with rapid ventricular response  Left axis deviation  Nonspecific ST abnormality  Abnormal ECG  When compared with ECG of 29-JUN-2012 11:33,  Atrial fibrillation has replaced Sinus rhythm  Vent.  rate has increased BY  73 BPM  Confirmed by Wm Hunt ([id number]) on 10/10/2021 9:32:72 AM     METABOLIC PANEL, BASIC    Collection Time: 10/10/21  4:40 AM   Result Value Ref Range    Sodium 144 136 - 145 mmol/L    Potassium 3.9 3.5 - 5.1 mmol/L    Chloride 106 98 - 107 mmol/L    CO2 32 21 - 32 mmol/L    Anion gap 6 (L) 7 - 16 mmol/L    Glucose 104 (H) 65 - 100 mg/dL    BUN 70 (H) 8 - 23 MG/DL    Creatinine 2.12 (H) 0.6 - 1.0 MG/DL    GFR est AA 29 (L) >60 ml/min/1.73m2    GFR est non-AA 24 (L) >60 ml/min/1.73m2    Calcium 9.2 8.3 - 10.4 MG/DL   MAGNESIUM    Collection Time: 10/10/21  4:40 AM   Result Value Ref Range    Magnesium 2.4 1.8 - 2.4 mg/dL       All Micro Results     Procedure Component Value Units Date/Time    CULTURE, URINE [[id number]]  (Abnormal)  (Susceptibility) Collected: 10/04/21 2107    Order Status: Completed Specimen: Cath Urine Updated: 10/07/21 1799     Special Requests: NO SPECIAL REQUESTS        Culture result:       >100,000 COLONIES/mL KLEBSIELLA PNEUMONIAE                Other Studies:  XR CHEST SNGL V    Result Date: 10/9/2021  Chest portable CLINICAL INDICATION: Right PICC placement, respiratory distress, bilateral lung infiltrates, cardiac enlargement COMPARISON: Radiograph yesterday TECHNIQUE: single AP portable view chest at 6:00 PM supine FINDINGS: There is a right PICC with tip projecting over right atrium. The mediastinal and hilar contours are stable given positioning, technique. Slightly shallower lung volumes leading to crowding. Interval slightly decreased diffuse bilateral lung opacities. Small pleural effusions have slightly decreased in both bases. No pneumothorax or complete lobar consolidation. 1. Right PICC placement. 2. Persisting bilateral lung opacities and small pleural effusions, slightly improved. XR ABD (KUB)    Result Date: 10/10/2021  EXAMINATION: Abdomen x-ray HISTORY: NGT placement TECHNIQUE: Frontal abdomen. COMPARISON: 10/9/2021 FINDINGS:  A nasogastric tube is again noted seen curled over the lower mid chest. Contrast was administered and may be contained within a hiatal hernia. Is this patient known to have a hiatal hernia? There appears to be gastric mucosa. I would confirm this prior to further use of this feeding tube. 1. Findings as described above. XR ABD (KUB)    Result Date: 10/10/2021  EXAMINATION: Abdomen x-ray HISTORY: NGT placement TECHNIQUE: Frontal abdomen. COMPARISON: No Comparison FINDINGS:  A nasogastric tube is visualized. The distal aspect curled over the epigastric region. The distal tip lies over the medial aspect of the chest. This may be within the distal esophagus unless there is a hiatal hernia to explain this location. 1. Findings as described above.        Current Meds:  Current Facility-Administered Medications   Medication Dose Route Frequency    levothyroxine (SYNTHROID) tablet 112 mcg  112 mcg Per G Tube ACB    amLODIPine (NORVASC) tablet 10 mg  10 mg Per G Tube DAILY    carvediloL (COREG) tablet 6.25 mg  6.25 mg Per G Tube BID WITH MEALS    cloNIDine (CATAPRES) 0.3 mg/24 hr patch 1 Patch  1 Patch TransDERmal Q7D    hydrALAZINE (APRESOLINE) 20 mg/mL injection 15 mg  15 mg IntraVENous Q4H PRN    central line flush (saline) syringe 10 mL  10 mL InterCATHeter Q8H    0.9% sodium chloride infusion  50 mL/hr IntraVENous CONTINUOUS    [Held by provider] furosemide (LASIX) injection 40 mg  40 mg IntraVENous BID    [Held by provider] QUEtiapine (SEROquel) tablet 12.5 mg  12.5 mg Oral QHS    acetaminophen (TYLENOL) tablet 650 mg  650 mg Oral Q4H PRN    [Held by provider] levETIRAcetam (KEPPRA) 500 mg in 0.9% sodium chloride (MBP/ADV) 100 mL MBP  500 mg IntraVENous Q12H    [Held by provider] divalproex (DEPAKOTE SPRINKLE) capsule 125 mg  125 mg Oral Q12H    LORazepam (ATIVAN) injection 1 mg  1 mg IntraVENous Q4H PRN    sodium chloride (NS) flush 5-40 mL  5-40 mL IntraVENous PRN    ondansetron (ZOFRAN) injection 8 mg  8 mg IntraVENous Q8H PRN       Signed:  Melania Gandara MD    Part of this note may have been written by using a voice dictation software. The note has been proof read but may still contain some grammatical/other typographical errors.

## 2021-10-10 NOTE — PROGRESS NOTES
TRANSFER - IN REPORT:    Verbal report received from Ayden Ayala RN(name) on Ramsey Jackson  being received from (unit) for routine progression of care      Report consisted of patients Situation, Background, Assessment and   Recommendations(SBAR). Information from the following report(s) SBAR, Kardex, ED Summary, Procedure Summary, Intake/Output, MAR, Accordion and Recent Results was reviewed with the receiving nurse. Opportunity for questions and clarification was provided. Assessment completed upon patients arrival to unit and care assumed.

## 2021-10-10 NOTE — PROGRESS NOTES
's at this time despite PRN Hydralazine administered per [facility]; still unable to administer p.o. meds due to no route. MD notified.

## 2021-10-10 NOTE — PROGRESS NOTES
10/10/21 1505   Dual Skin Pressure Injury Assessment   Dual Skin Pressure Injury Assessment X   Blister to left buttocks, sacrum intact, no redness. Scattered bruising to BUE. No other skin issues noted.

## 2021-10-10 NOTE — PROGRESS NOTES
Daughter, Maureen Flores ([phone]), updated at this time on POC for the evening. ICU consent obtained via telephone. No further questions. Maureen Sandra would like for patient's  to visit tomorrow if at all possible.  Will follow up with Charge RN

## 2021-10-10 NOTE — CONSULTS
Comprehensive Nutrition Assessment    Type and Reason for Visit: Initial, Positive nutrition screen, Consult  Best Practice Alert for Malnutrition Screening Tool: Recently Lost Weight Without Trying: Unsure,  , Eating Poorly Due to Decreased Appetite: Yes  Tube Feeding Management (Hospitalist)    Nutrition Recommendations/Plan:    Continue NPO   Will plan to initiate TF once EN access obtained. If unable to obtain EN access, patient does now have PICC placed if PN is warranted and pursued. Malnutrition Assessment:  Malnutrition Status: At risk for malnutrition (specify) (advanced age, NPO, no EN access)    Nutrition Assessment:   Nutrition History: Unable to assess. Nutrition Background: Patient with PMH signifncant for HTN, dementia. She presented with headache and increased confusion. CT of head showed right temporal IPH around 2 cm  Daily Update:  Patient seen and discussed with RN. NGT attempted again last evening but was found coiled in the chest. Per RN plan for IR placement of NGT tomorrow. SLP has signed off as patient continues to be inappropriate for PO trials. Nutrition Related Findings:   NFPE with no physcial signs of malnutrition      Current Nutrition Therapies:  DIET NPO    Current Intake:   Average Meal Intake: NPO        Anthropometric Measures:  Height: 5' 5\" (165.1 cm)  Current Body Wt: 71.8 kg (158 lb 4.6 oz) (10/10), Weight source: Not specified (suspect bed scale)  BMI: 26.3, Overweight (BMI 25.0-29. 9)  Admission Body Weight: 159 lb 6.3 oz (bed scale 10/3)  Ideal Body Weight (lbs) (Calculated): 125 lbs (57 kg), 126.6 %  Usual Body Wt: 72.1 kg (158 lb 15.2 oz) (per EMR review last weighed 4/2 office wt), Percent weight change: -0.4          Edema: No data recorded   Estimated Daily Nutrient Needs:  Energy (kcal/day): 1938-6066 (Kcal/kg (20-25), Weight Used: Current (71.8 kg (10/10)))  Protein (g/day): 72-86 (1-1.2 g/kg) Weight Used: (Current)  Fluid (ml/day):   (1 ml/kcal)    Nutrition Diagnosis:   · Inadequate oral intake related to cognitive or neurological impairment as evidenced by  (IC bleed, NPO)    Nutrition Interventions:   Food and/or Nutrient Delivery:  (pending EN access)     Coordination of Nutrition Care: Continue to monitor while inpatient  Plan of Care discussed with [facility], RN    Goals: Active Goal: Initiate TF within 2 days    Nutrition Monitoring and Evaluation:      Food/Nutrient Intake Outcomes:  (EN access, initiation of TF)  Physical Signs/Symptoms Outcomes: Biochemical data, GI status, Weight    Discharge Planning:     Too soon to determine    [address] [place], LD on 10/10/2021 at 12:27 PM  Contact: [phone]        Disaster Mode Active

## 2021-10-10 NOTE — PROGRESS NOTES
-150; MD notified. States okay with BP< 150 at this time. UOP this shift 75mL; MD notified. No new orders; will continue to monitor.

## 2021-10-10 NOTE — PROGRESS NOTES
Pt transported to 7th floor at this time with RN and transporter on 3L n/c and tele monitor. Dual NIH completed with Liberty Wilson RN. No acute neuro changes. No pressure injuries at this time.

## 2021-10-10 NOTE — PROGRESS NOTES
10/10/21 1827   NIH Stroke Scale   Interval Handoff/Transfer  (shift change)   Level of Conciousness (1a) (!) 2   LOC Questions (1b) (!) 2   LOC Commands (1c) (!) 2   Best Gaze (2) 0   Visual (3) 0   Facial Palsy (4) 0   Motor Arm, Left (5a) (!) 1   Motor Arm, Right (5b) (!) 1   Motor Leg, Left (6a) (!) 2   Motor Leg, Right (6b) (!) 2   Limb Ataxia (7) 0   Sensory (8) (!) 1   Best Language (9) (!) 3   Dysarthria (10) (!) 2   Extinction and Inattention (11) 0   Total 18   Dual [facility] with Adali MCCRAY

## 2021-10-11 PROBLEM — I50.1 ACUTE PULMONARY EDEMA WITH CONGESTIVE HEART FAILURE (HCC): Status: RESOLVED | Noted: 2021-10-11 | Resolved: 2021-10-11

## 2021-10-11 PROBLEM — I48.91 ATRIAL FIBRILLATION WITH RVR (HCC): Status: RESOLVED | Noted: 2021-10-10 | Resolved: 2021-10-11

## 2021-10-11 PROBLEM — J90 PLEURAL EFFUSION, BILATERAL: Status: ACTIVE | Noted: 2021-10-11

## 2021-10-11 PROBLEM — I50.1 ACUTE PULMONARY EDEMA WITH CONGESTIVE HEART FAILURE (HCC): Status: ACTIVE | Noted: 2021-10-11

## 2021-10-11 PROBLEM — I48.0 PAF (PAROXYSMAL ATRIAL FIBRILLATION) (HCC): Status: ACTIVE | Noted: 2021-10-11

## 2021-10-11 PROBLEM — I50.32 CHRONIC DIASTOLIC CONGESTIVE HEART FAILURE (HCC): Status: ACTIVE | Noted: 2021-10-11

## 2021-10-11 LAB
ANION GAP SERPL CALC-SCNC: 6 MMOL/L (ref 7–16)
BUN SERPL-MCNC: 79 MG/DL (ref 8–23)
CALCIUM SERPL-MCNC: 8.9 MG/DL (ref 8.3–10.4)
CHLORIDE SERPL-SCNC: 113 MMOL/L (ref 98–107)
CO2 SERPL-SCNC: 29 MMOL/L (ref 21–32)
CREAT SERPL-MCNC: 2.33 MG/DL (ref 0.6–1)
GLUCOSE SERPL-MCNC: 89 MG/DL (ref 65–100)
MAGNESIUM SERPL-MCNC: 2.4 MG/DL (ref 1.8–2.4)
POTASSIUM SERPL-SCNC: 3.9 MMOL/L (ref 3.5–5.1)
SODIUM SERPL-SCNC: 148 MMOL/L (ref 136–145)

## 2021-10-11 PROCEDURE — 83735 ASSAY OF MAGNESIUM: CPT

## 2021-10-11 PROCEDURE — 3E0G76Z INTRODUCTION OF NUTRITIONAL SUBSTANCE INTO UPPER GI, VIA NATURAL OR ARTIFICIAL OPENING: ICD-10-PCS | Performed by: INTERNAL MEDICINE

## 2021-10-11 PROCEDURE — 74011000250 HC RX REV CODE- 250: Performed by: FAMILY MEDICINE

## 2021-10-11 PROCEDURE — 97112 NEUROMUSCULAR REEDUCATION: CPT

## 2021-10-11 PROCEDURE — 80048 BASIC METABOLIC PNL TOTAL CA: CPT

## 2021-10-11 PROCEDURE — 74011000250 HC RX REV CODE- 250: Performed by: INTERNAL MEDICINE

## 2021-10-11 PROCEDURE — 97530 THERAPEUTIC ACTIVITIES: CPT

## 2021-10-11 PROCEDURE — 36592 COLLECT BLOOD FROM PICC: CPT

## 2021-10-11 PROCEDURE — 74011250636 HC RX REV CODE- 250/636: Performed by: INTERNAL MEDICINE

## 2021-10-11 PROCEDURE — 65270000029 HC RM PRIVATE

## 2021-10-11 PROCEDURE — 97535 SELF CARE MNGMENT TRAINING: CPT

## 2021-10-11 PROCEDURE — 77030041247 HC PROTECTOR HEEL HEELMEDIX MDII -B

## 2021-10-11 PROCEDURE — 2709999900 HC NON-CHARGEABLE SUPPLY

## 2021-10-11 RX ORDER — METOPROLOL TARTRATE 5 MG/5ML
5 INJECTION INTRAVENOUS
Status: COMPLETED | OUTPATIENT
Start: 2021-10-11 | End: 2021-10-13

## 2021-10-11 RX ORDER — DEXTROSE MONOHYDRATE AND SODIUM CHLORIDE 5; .45 G/100ML; G/100ML
150 INJECTION, SOLUTION INTRAVENOUS CONTINUOUS
Status: DISCONTINUED | OUTPATIENT
Start: 2021-10-11 | End: 2021-10-11

## 2021-10-11 RX ORDER — METOPROLOL TARTRATE 5 MG/5ML
5 INJECTION INTRAVENOUS ONCE
Status: DISPENSED | OUTPATIENT
Start: 2021-10-11 | End: 2021-10-11

## 2021-10-11 RX ORDER — DEXTROSE MONOHYDRATE AND SODIUM CHLORIDE 5; .45 G/100ML; G/100ML
150 INJECTION, SOLUTION INTRAVENOUS CONTINUOUS
Status: DISPENSED | OUTPATIENT
Start: 2021-10-11 | End: 2021-10-12

## 2021-10-11 RX ADMIN — Medication 10 ML: at 21:49

## 2021-10-11 RX ADMIN — Medication 10 ML: at 13:20

## 2021-10-11 RX ADMIN — HYDRALAZINE HYDROCHLORIDE 15 MG: 20 INJECTION INTRAMUSCULAR; INTRAVENOUS at 21:48

## 2021-10-11 RX ADMIN — DEXTROSE MONOHYDRATE AND SODIUM CHLORIDE 150 ML/HR: 5; .45 INJECTION, SOLUTION INTRAVENOUS at 11:33

## 2021-10-11 RX ADMIN — DEXTROSE MONOHYDRATE AND SODIUM CHLORIDE 150 ML/HR: 5; .45 INJECTION, SOLUTION INTRAVENOUS at 17:56

## 2021-10-11 RX ADMIN — METOPROLOL TARTRATE 5 MG: 5 INJECTION INTRAVENOUS at 17:56

## 2021-10-11 RX ADMIN — Medication 10 ML: at 04:36

## 2021-10-11 RX ADMIN — HYDRALAZINE HYDROCHLORIDE 15 MG: 20 INJECTION INTRAMUSCULAR; INTRAVENOUS at 00:16

## 2021-10-11 NOTE — PROGRESS NOTES
Comprehensive Nutrition Assessment    Type and Reason for Visit: Reassess  Tube Feeding Management (Hospitalist)    Nutrition Recommendations/Plan:   Enteral Nutrition: Once EN access is obtained and medically cleared for use  Enteral Access: Nasogastric  Formula: Jevity 1.5 Shabbir (Standard with Fiber)  Delivery: Continuous feeding: Initiate at  10ml/hour, progress by 10ml/10 hours to goal rate of 50ml/hour. Water flush 135 ml every 4 hours  Modulars: None   Enteral regimen at goal will provide 1650 calories (100% estimated calorie needs), 71 grams protein (99% estimated protein needs) and 1646ml free fluid (~1ml/kcal) calculations based on 22 hour infusion. IV Fluids:  Per MD.   Nutritional Supplement Therapy:   Implement electrolyte replacement per nutrition support protocols  Replacement indicated:  None  Labs:   EN labs: BMP daily, Mg MWF and Phos MWF. Meals and Snacks:   NPO     Malnutrition Assessment:  Malnutrition Status: At risk for malnutrition (specify) (advanced age, NPO, no EN access)    Nutrition Assessment:   Nutrition History: Unable to assess. Nutrition Background: Patient with PMH signifncant for HTN, dementia. She presented with headache and increased confusion. CT of head showed right temporal IPH around 2 cm  Daily Update:  Pt seen at bedside, disoriented and does not open eyes to RD voice. Discussed with RN. RN reports pt pending IR placement of NGT today. Discussed initiating TF order once EN access is cleared for use. Abdominal Status (last documented): Semi-soft abdomen with Active  bowel sounds. Last BM 10/06/21.   Pertinent Medications: D5 @ 150ml/hr, keppra, lasix (both continue to be held as pt NPO w/o access)  Pertinent Labs:  Lab Results   Component Value Date/Time    Sodium 148 (H) 10/11/2021 04:45 AM    Potassium 3.9 10/11/2021 04:45 AM    Chloride 113 (H) 10/11/2021 04:45 AM    CO2 29 10/11/2021 04:45 AM    Anion gap 6 (L) 10/11/2021 04:45 AM    Glucose 89 10/11/2021 04:45 AM    BUN 79 (H) 10/11/2021 04:45 AM    Creatinine 2.33 (H) 10/11/2021 04:45 AM    Calcium 8.9 10/11/2021 04:45 AM    Albumin 3.1 (L) 10/04/2021 05:31 AM    Magnesium 2.4 10/11/2021 04:45 AM   Labs reviewed and remarkable for hypernatremia, pt continues on D5 @ 150ml/hr. All other labs relatively stable. Nutrition Related Findings:   NFPE with no physcial signs of malnutrition. 10/11- Pt pending IR for tube placement, TF to begin pending clearance. Current Nutrition Therapies:  DIET NPO    Current Intake:   Average Meal Intake: NPO        Anthropometric Measures:  Height: 5' 5\" (165.1 cm)  Current Body Wt: 71.5 kg (157 lb 10.1 oz) (10/10), Weight source: Bed scale  BMI: 26.2, Overweight (BMI 25.0-29. 9)  Admission Body Weight: 159 lb 6.3 oz (bed scale 10/3)  Ideal Body Weight (lbs) (Calculated): 125 lbs (57 kg), 126.1 %  Usual Body Wt: 72.1 kg (158 lb 15.2 oz) (per EMR review last weighed 4/2 office wt), Percent weight change: -0.4          Edema: No data recorded   Estimated Daily Nutrient Needs:  Energy (kcal/day): 1278-7469 (Kcal/kg (20-25), Weight Used: Current (71.8 kg (10/10)))  Protein (g/day): 72-86 (1-1.2 g/kg) Weight Used: (Current)  Fluid (ml/day):   (1 ml/kcal)    Nutrition Diagnosis:   · Inadequate oral intake related to cognitive or neurological impairment as evidenced by NPO or clear liquid status due to medical condition, nutrition support-enteral nutrition (IC bleed)       Nutrition Interventions:   Food and/or Nutrient Delivery: Start tube feeding (pending EN access)     Coordination of Nutrition Care: Continue to monitor while inpatient  Plan of Care discussed with Patti Ramirez RN    Goals:   Previous Goal Met: Progressing toward goal(s)  Active Goal: Initiate and tolerate TF by next RD follow up.     Nutrition Monitoring and Evaluation:      Food/Nutrient Intake Outcomes:  (EN access, initiation of TF)  Physical Signs/Symptoms Outcomes: Biochemical data, GI status, Weight    Discharge Planning:     Too soon to determine    Ion Noyola RD, LD  [phone]    Disaster Mode Active

## 2021-10-11 NOTE — PROGRESS NOTES
10/11/21 0714   NIH Stroke Scale   Interval Handoff/Transfer  (Dual NIH w/ Estrellita Ocasio RN)   Level of Conciousness (1a) (!) 2   LOC Questions (1b) (!) 2   LOC Commands (1c) (!) 2   Best Gaze (2) 0   Visual (3) 0   Facial Palsy (4) 0   Motor Arm, Left (5a) (!) 1   Motor Arm, Right (5b) (!) 1   Motor Leg, Left (6a) (!) 2   Motor Leg, Right (6b) (!) 2   Limb Ataxia (7) 0   Sensory (8) (!) 1   Best Language (9) (!) 3   Dysarthria (10) (!) 2   Extinction and Inattention (11) 0   Total 18

## 2021-10-11 NOTE — PROGRESS NOTES
ACUTE OT GOALS:  (Developed with and agreed upon by patient and/or caregiver.)  1. Patient will follow 75% of one step verbal or visual commands to increase participation during ADL performance. 2. Patient will tolerate 15 minutes static sitting balance unsupported with CGA while completing functional activity. 3. Patient will tolerate 25  minutes of OT treatment with 2-3 rest breaks to increase activity tolerance for ADLs. 4. Patient will complete functional transfers with CGA and adaptive equipment as needed. 5. Patient will complete upper body bathing and dressing with SBA and adaptive equipment as needed. 6. Patient will complete self-grooming with SBA and adaptive equipment as needed. 7. Patient will tolerate 30 minutes BUE therapeutic exercises to increase functional use during ADL performance.     Timeframe: 7 visits     OCCUPATIONAL THERAPY: Daily Note OT Treatment Day # 2    Lakeisha Osborne is a 78 y.o. female   PRIMARY DIAGNOSIS: Hemorrhage of right temporal lobe (HCC)  Intracranial bleed ([facility] [I62.9]  Dementia ([facility] [F03.90]  Poorly-controlled hypertension [I10]  Hypothyroidism [E03.9]  Encephalopathy, unspecified [G93.40]       Payor: Tonja Garcia / Plan: Damion Guaman / Product Type: Managed Care Medicare /   ASSESSMENT:     REHAB RECOMMENDATIONS: CURRENT LEVEL OF FUNCTION:  (Most Recently Demonstrated)   Recommendation to date pending progress:  Setting:   Short-term Rehab  Equipment:    To Be Determined Bathing:   Not tested  Dressing:   Not tested  Feeding/Grooming:   Total Assistance  Toileting:   Not tested  Functional Mobility:   Total Assistance     ASSESSMENT:  Ms. Zachary Estrada is doing fair today. Pt greeted in supine. Pt is able to follow commands minimally this session. Positioned pt EOB with max A/total A. Completed grooming with max A as well. Pt is unable to sustain balance and requires max A to maintain upright position.  Pt returned to supine and positioned on L side with pillows to promote elbow extension. Pt made little to no progress towards goals this session. Pt would continue to benefit from skilled OT during stay. SUBJECTIVE:   Ms. Jonas Bautista was not responding verbally.     SOCIAL HISTORY/LIVING ENVIRONMENT:   Support Systems: Spouse/Significant Other Judith Gandhi [phone] (spouse))    OBJECTIVE:     PAIN: VITAL SIGNS: LINES/DRAINS:   Pre Treatment:    Post Treatment: 0   N/A  O2 Device: Nasal cannula     ACTIVITIES OF DAILY LIVING: I Mod I S SBA CGA Min Mod Max Total NT Comments   BASIC ADLs:              Bathing/ Showering [] [] [] [] [] [] [] [] [] [x]    Toileting [] [] [] [] [] [] [] [] [] [x]    Dressing [] [] [] [] [] [] [] [] [] [x]    Feeding [] [] [] [] [] [] [] [] [] [x]    Grooming [] [] [] [] [] [] [] [x] [] []    Personal Device Care [] [] [] [] [] [] [] [] [] [x]    Functional Mobility [] [] [] [] [] [] [] [x] [x] []    I=Independent, Mod I=Modified Independent, S=Supervision, SBA=Standby Assistance, CGA=Contact Guard Assistance,   Min=Minimal Assistance, Mod=Moderate Assistance, Max=Maximal Assistance, Total=Total Assistance, NT=Not Tested    MOBILITY: I Mod I S SBA CGA Min Mod Max Total  NT x2 Comments:   Supine to sit [] [] [] [] [] [] [] [x] [x] [] []    Sit to supine [] [] [] [] [] [] [] [x] [x] [] []    Sit to stand [] [] [] [] [] [] [] [] [] [x] []    Bed to chair [] [] [] [] [] [] [] [] [] [x] []    I=Independent, Mod I=Modified Independent, S=Supervision, SBA=Standby Assistance, CGA=Contact Guard Assistance,   Min=Minimal Assistance, Mod=Moderate Assistance, Max=Maximal Assistance, Total=Total Assistance, NT=Not Tested    BALANCE: Good Fair+ Fair Fair- Poor NT Comments   Sitting Static [] [] [] [] [x] []    Sitting Dynamic [] [] [] [] [x] []              Standing Static [] [] [] [] [] [x]    Standing Dynamic [] [] [] [] [] [x]      PLAN:   FREQUENCY/DURATION: OT Plan of Care: 3 times/week for duration of hospital stay or until stated goals are met, whichever comes first.    TREATMENT:   TREATMENT:   (     )  Co-Treatment PT/OT necessary due to patient's decreased overall endurance/tolerance levels, as well as need for high level skilled assistance to complete functional transfers/mobility and functional tasks  Self Care (10 Minutes): Self care including Grooming to increase independence and decrease level of assistance required. Neuromuscular Re-education (28 Minutes): Neuromuscular Re-education included Balance Training, Coordination training, Postural training and Sitting balance training to improve Balance, Coordination, Functional Mobility and Postural Control.     TREATMENT GRID:  N/A    AFTER TREATMENT POSITION/PRECAUTIONS:  Chair, Needs within reach and mitts and lap belt    INTERDISCIPLINARY COLLABORATION:  RN/PCT, PT/PTA and OT/ALMEIDA    TOTAL TREATMENT DURATION:  OT Patient Time In/Time Out  Time In: 1344  Time Out: 715 [facility]

## 2021-10-11 NOTE — PROGRESS NOTES
Pt's heart rate remaining in 130-150s for about past 10 minutes per monitor room, pt in Afib. Hospitalist notified. Order received for 1 time dose metoprolol 5 mg IV.

## 2021-10-11 NOTE — PROGRESS NOTES
Hospitalist Progress Note   Admit Date:  10/3/2021  2:16 AM   LOS: 8 days   Name:  Refugio Pierre   Age:  78 y.o. Sex:  female  :  1940   MRN:  [id number]     Chief Complaint: Headache and confusion  Reason(s) for Admission: Intracranial bleed ([address] [I62.9]  Dementia ([address] [F03.90]  Poorly-controlled hypertension [I10]  Hypothyroidism [E03.9]  Encephalopathy, unspecified [G93.40]     Hospital Course & Interval History:   80 y/o WF with a h/o HTN, dementia, hypothyroidism who presented to ER on 10/3 with c/o headache and confusion. CT head showed R temporal IPH around 2 cm. 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 Evaluated by Neurosurgery evaluated who did not feel her bleed was related to the aneurysm. Admitted to ICU for BP control and frequent Neuro checks. Code S was called overnight 10/3 for severe headache associated with nausea and dry heaving. She was started on mannitol and Cardene to reduce intracranial pressure. Stat CT head showed no new changes. Has had intermittent delirium and agitation and started on Depakote and Seroquel. CT head 10/5 unchanged, now off hypertonic saline and mannitol.  Meds adjusted due to over sedation on 10/5 (Seroquel dose halved, Depakote held). This improved but she became more agitated on 10/6 so Depakote was unheld. Repeat head CT 10/8 again unchanged. NGT attempted 10/9 but unable to be placed appropriately. Subjective (10/11/21): She remains encephalopathic. Waves hands around. Not opening eyes. No purposeful movement. Review of systems negative except stated above.     Assessment & Plan:     Principal Problem:    Hemorrhage of right temporal lobe ([address] (10/10/2021)  - 10/3 CT Head with 2.3 cm intraparenchymal hematoma in the right temporal lobe  - All CT's since shows stable hemorrhage  - No OAC  - PT/OT  - CTA showed aneurysm but not in area of her hemorrhage  - Non-surgical per Neurosurgery    Active Problems:    LEODAN (acute kidney injury) ([facility] (10/10/2021)  - Creatinine worsening  - 10/9 Creatinine 1.59  - 10/11 Creatinine 2.12  - 10/11 Creatinine 2.33  - Likely prerenal from dehdration + Lasix  - Will give back 2L IVFs today but be mindful of pulmonary edema  - Ultimately needs NGT and TFs with free water for hydration      Acute metabolic encephalopathy (91/04/9671)  - Ongoing. Multifactorial from hospitalization/ICU, acute hemorrhage in the setting of underlying dementia.   - Completed ceftriaxone for cystitis, on 10/7.   - Seroquel, Depakote and Keppra held  - Unable to get NGT placed after several attempts. Will likely need to get done in IR with fluoro tomorrow.       Pleural effusion, bilateral (10/11/2021)  - Due to chronic dCHF  - S/P Lasix  - Getting IVFs due to LEODAN  - Monitor closely      Poorly-controlled hypertension (10/3/2021)  - BP still elevated above goal SBP of 140  - Continue Clonidine patch  - Give Amlodipine when about to get NGT  - Continue Coreg when can get NGT      Chronic diastolic congestive heart failure ([facility] (10/11/2021)   - No acute issues  - Continue Coreg when can get NGT      PAF (paroxysmal atrial fibrillation) ([facility] (10/11/2021)  - Stable for now  - Continue Coreg when can get NGT  - No OAC      Hypothyroidism (6/29/2012)  - Continue Synthroid when get NGT      Dementia ([facility] (10/3/2021)    Diet:  DIET NPO  DVT PPx: SCDs  Code status: Full Code    Hospital Problems as of 10/11/2021 Never Reviewed        Codes Class Noted - Resolved POA    * (Principal) Hemorrhage of right temporal lobe ([facility] ICD-10-CM: I61.1  ICD-9-CM: 419  10/10/2021 - Present Yes        LEODAN (acute kidney injury) ([facility] ICD-10-CM: N17.9  ICD-9-CM: 584.9  10/10/2021 - Present No        Acute metabolic encephalopathy IFO-01-VE: G93.41  ICD-9-CM: 348.31  10/10/2021 - Present Yes        Pleural effusion, bilateral ICD-10-CM: J90  ICD-9-CM: 511.9  10/11/2021 - Present Yes        Chronic diastolic congestive heart failure (HCC) ICD-10-CM: I50.32  ICD-9-CM: 428.32, 428.0  10/11/2021 - Present Yes        PAF (paroxysmal atrial fibrillation) (HCC) ICD-10-CM: I48.0  ICD-9-CM: 427.31  10/11/2021 - Present Yes        Poorly-controlled hypertension ICD-10-CM: I10  ICD-9-CM: 401.9  10/3/2021 - Present Yes        Dementia ([facility] ICD-10-CM: F03.90  ICD-9-CM: 294.20  10/3/2021 - Present Yes        Hypothyroidism ICD-10-CM: E03.9  ICD-9-CM: 244.9  6/29/2012 - Present Yes        RESOLVED: Acute pulmonary edema with congestive heart failure (HCC) ICD-10-CM: I50.1  ICD-9-CM: 428.1  10/11/2021 - 10/11/2021 Clinically Undetermined        RESOLVED: Atrial fibrillation with RVR ([facility] ICD-10-CM: I48.91  ICD-9-CM: 427.31  10/10/2021 - 10/11/2021 Clinically Undetermined        RESOLVED: UTI (urinary tract infection) ICD-10-CM: N39.0  ICD-9-CM: 599.0  7/2/2012 - 10/11/2021 Yes              Objective:     Patient Vitals for the past 24 hrs:   Temp Pulse Resp BP SpO2   10/11/21 0800 97 °F (36.1 °C) 79 16 (!) 152/80 98 %   10/11/21 0400 97.4 °F (36.3 °C) 77 18 (!) 150/73 95 %   10/11/21 0000 97.3 °F (36.3 °C) 83 18 (!) 158/87 97 %   10/10/21 2254  88      10/10/21 2144  (!) 138      10/10/21 2000 99 °F (37.2 °C) 94 18 (!) 145/71 93 %   10/10/21 1600 98.9 °F (37.2 °C) 79 17 (!) 105/57 95 %   10/10/21 1505 99.4 °F (37.4 °C) 98 19 (!) 148/66 96 %   10/10/21 1430    (!) 123/53    10/10/21 1411  85  (!) 135/57 98 %   10/10/21 1402  89  (!) 151/63 97 %   10/10/21 1347  82  (!) 160/70 96 %   10/10/21 1302  87  (!) 143/80 97 %   10/10/21 1232  80  139/65 96 %   10/10/21 1202  78  (!) 152/75 95 %   10/10/21 1149  85 20     10/10/21 1134  79 21     10/10/21 1132  82  (!) 142/65 97 %   10/10/21 1102 97.4 °F (36.3 °C) 71 13 (!) 116/50 98 %   10/10/21 1050  71 18 (!) 122/50 97 %   10/10/21 1032    (!) 99/51 98 %   10/10/21 1030  70 16     10/10/21 1002  73  (!) 99/51 97 %     Oxygen Therapy  O2 Sat (%): 98 % (10/11/21 0800)  Pulse via Oximetry: 85 beats per minute (10/10/21 1411)  O2 Device: Nasal cannula (10/09/21 2255)  Skin Assessment: Other (see comment/note) (no breakdown; mucosa dry) (10/09/21 1900)  Skin Protection for O2 Device: N/A (10/03/21 2001)  O2 Flow Rate (L/min): 3 l/min (10/09/21 2255)  O2 Temperature: 35.6 °F (2 °C) (10/09/21 0705)    Estimated body mass index is 26.23 kg/m² as calculated from the following:    Height as of this encounter: 5' 5\" (1.651 m). Weight as of this encounter: 71.5 kg (157 lb 9.6 oz). Intake/Output Summary (Last 24 hours) at 10/11/2021 0986  Last data filed at 10/11/2021 0452  Gross per 24 hour   Intake    Output 325 ml   Net -325 ml         Physical Exam:   General:    Encephalopathic, flailing arms, won't open eyes  Head:  Normocephalic, atraumatic  Eyes:  Sclerae appear normal.  Pupils equally round. ENT:  Nares appear normal, no drainage. Moist oral mucosa  Neck:  No restricted ROM. Trachea midline   CV:   RRR. No m/r/g. No jugular venous distension. Lungs:   Scattered rhonchi bilaterally. No wheezing. Respirations even, unlabored  Abdomen: Bowel sounds present. Soft, nontender, nondistended. Extremities: No cyanosis or clubbing. No edema  Skin:     No rashes and normal coloration. Warm and dry. Neuro: Cranial nerves II-XII grossly intact. Encephalopathic, flailing arms, won't open eyes  Psych: Normal mood and affect. Encephalopathic.     I have reviewed ordered lab tests and independently visualized imaging below:    Last 24hr Labs:  Recent Results (from the past 24 hour(s))   METABOLIC PANEL, BASIC    Collection Time: 10/11/21  4:45 AM   Result Value Ref Range    Sodium 148 (H) 136 - 145 mmol/L    Potassium 3.9 3.5 - 5.1 mmol/L    Chloride 113 (H) 98 - 107 mmol/L    CO2 29 21 - 32 mmol/L    Anion gap 6 (L) 7 - 16 mmol/L    Glucose 89 65 - 100 mg/dL    BUN 79 (H) 8 - 23 MG/DL    Creatinine 2.33 (H) 0.6 - 1.0 MG/DL    GFR est AA 26 (L) >60 ml/min/1.73m2    GFR est non-AA 21 (L) >60 ml/min/1.73m2    Calcium 8.9 8.3 - 10.4 MG/DL   MAGNESIUM    Collection Time: 10/11/21  4:45 AM   Result Value Ref Range    Magnesium 2.4 1.8 - 2.4 mg/dL       All Micro Results     Procedure Component Value Units Date/Time    CULTURE, URINE [[id number]]  (Abnormal)  (Susceptibility) Collected: 10/04/21 210    Order Status: Completed Specimen: Cath Urine Updated: 10/07/21 2308     Special Requests: NO SPECIAL REQUESTS        Culture result:       >100,000 COLONIES/mL KLEBSIELLA PNEUMONIAE                SARS-CoV-2 Lab Results  \"Novel Coronavirus\" Test: No results found for: COV2NT   \"Emergent Disease\" Test: No results found for: EDPR  \"SARS-COV-2\" Test: No results found for: XGCOVT  \"Precision Labs\" Test: No results found for: RSLT  Rapid Test: No results found for: COVR       Imaging:  No results found. No results found for this visit on 10/03/21.     Current Meds:  Current Facility-Administered Medications   Medication Dose Route Frequency    metoprolol (LOPRESSOR) injection 5 mg  5 mg IntraVENous ONCE    dextrose 5 % - 0.45% NaCl infusion  150 mL/hr IntraVENous CONTINUOUS    levothyroxine (SYNTHROID) tablet 112 mcg  112 mcg Per G Tube ACB    amLODIPine (NORVASC) tablet 10 mg  10 mg Per G Tube DAILY    carvediloL (COREG) tablet 6.25 mg  6.25 mg Per G Tube BID WITH MEALS    cloNIDine (CATAPRES) 0.3 mg/24 hr patch 1 Patch  1 Patch TransDERmal Q7D    hydrALAZINE (APRESOLINE) 20 mg/mL injection 15 mg  15 mg IntraVENous Q4H PRN    central line flush (saline) syringe 10 mL  10 mL InterCATHeter Q8H    [Held by provider] furosemide (LASIX) injection 40 mg  40 mg IntraVENous BID    [Held by provider] QUEtiapine (SEROquel) tablet 12.5 mg  12.5 mg Oral QHS    acetaminophen (TYLENOL) tablet 650 mg  650 mg Oral Q4H PRN    [Held by provider] levETIRAcetam (KEPPRA) 500 mg in 0.9% sodium chloride (MBP/ADV) 100 mL MBP  500 mg IntraVENous Q12H    [Held by provider] divalproex (DEPAKOTE SPRINKLE) capsule 125 mg  125 mg Oral Q12H    LORazepam (ATIVAN) injection 1 mg  1 mg IntraVENous Q4H PRN    sodium chloride (NS) flush 5-40 mL  5-40 mL IntraVENous PRN    ondansetron (ZOFRAN) injection 8 mg  8 mg IntraVENous Q8H PRN       Discharge Plan:     To STR when appropriate    Signed By: Julia Kilgore DO     October 11, 2021

## 2021-10-11 NOTE — PROGRESS NOTES
Chart review complete. Pt having NGT placed today. PT/OT evals pending. Pt's dc needs unknown at present. CM to continue to follow to assist as needed.

## 2021-10-11 NOTE — PROGRESS NOTES
ACUTE PHYSICAL THERAPY GOALS:  (Developed with and agreed upon by patient and/or caregiver.)  1. Patient will perform bed mobility with MODERATE ASSISTANCE within 3 days. 2. Patient will transfer sit to stand with MODERATE ASSISTANCE  3. Patient will transfer from bed to chair with MAXIMAL ASSISTANCE within 3 days. 4. Patient will demonstrate GOOD STATIC STANDING balance within 3 day(s). 5. Patient will tolerate 15+ minutes of therapeutic activity/exercise and/or neuromuscular re-education while maintaining stable vitals to improve functional strength and activity tolerance within 3 days.     LT. Patient will perform bed mobility with MINIMAL ASSISTANCE within 7 days. 2. Patient will transfer bed to chair with MINIMAL ASSISTANCE within 7 days. 3. Patient will demonstrate GOOD DYNAMIC SITTING balance within 7 day(s). 4. Patient will ambulate 50ft+ using least restrictive assistive device and MINIMAL ASSISTANCE within 7 days. 5. Patient will tolerate 25+ minutes of therapeutic activity/exercise and/or neuromuscular re-education while maintaining stable vitals to improve functional strength and activity tolerance within 7 days. PHYSICAL THERAPY: Daily Note and PM Treatment Day # 1    Zachary Fuentes is a 78 y.o. female   PRIMARY DIAGNOSIS: Hemorrhage of right temporal lobe (HCC)  Intracranial bleed ([facility] [I62.9]  Dementia ([facility] [F03.90]  Poorly-controlled hypertension [I10]  Hypothyroidism [E03.9]  Encephalopathy, unspecified [G93.40]         ASSESSMENT:     REHAB RECOMMENDATIONS: CURRENT LEVEL OF FUNCTION:  (Most Recently Demonstrated)   Recommendation to date pending progress:  Setting:   Short-term Rehab  Equipment:    To Be Determined Bed Mobility:   Total Assistance x 2  Sit to Stand:   Not tested  Transfers:   Not tested  Gait/Mobility:   Not tested     ASSESSMENT:  Ms. Nikole Richardson is an 78year old female admitted with AMS and found to have right intraparenchymal hemorrhage and SDH.  Patient seen this PM for PT treatment session. Patient required noxious stimulation for eyes open; command following appreciated for eyes open as well as  bilaterally; otherwise minimal command following or purposeful mobility. Eyes deviated upward and to the right, blink reflex present B, no tracking past midline to right. Tone appreciated in B UEs (unique presentation with flexion as well as internal rotation L>R). Addressed rolling, scooting, supine to sit, sit to supine, as well as extensive positioning for tone reduction and follow loading. Goals remain appropriate. Continue with stated plan of care. SUBJECTIVE:   Ms. Mir Patel states, patient non-verbal    SOCIAL HISTORY/ LIVING ENVIRONMENT: Unknown baseline mobility.    Support Systems: Spouse/Significant Other Alina Rob [phone] (spouse))  OBJECTIVE:     PAIN: VITAL SIGNS: LINES/DRAINS:   Pre Treatment: Pain Screen  Pain Scale 1: FLACC  Pain Intensity 1: 1  Pain Onset 1: mobility  Pain Location 1: Back  Pain Orientation 1: Lower;Mid  Pain Intervention(s) 1: Emotional support;Position  Post Treatment: FLACC 0   Reynaga Catheter and IV  O2 Device: Nasal cannula     MOBILITY: I Mod I S SBA CGA Min Mod Max Total  NT x2 Comments:   Bed Mobility    Rolling [] [] [] [] [] [] [] [] [x] [] [x]    Supine to Sit [] [] [] [] [] [] [] [] [x] [] [x]    Scooting [] [] [] [] [] [] [] [] [x] [] [x]    Sit to Supine [] [] [] [] [] [] [] [] [x] [] [x]    Transfers    Sit to Stand [] [] [] [] [] [] [] [] [] [] [x]    Bed to Chair [] [] [] [] [] [] [] [] [] [] [x]    Stand to Sit [] [] [] [] [] [] [] [] [] [] [x]    I=Independent, Mod I=Modified Independent, S=Supervision, SBA=Standby Assistance, CGA=Contact Guard Assistance,   Min=Minimal Assistance, Mod=Moderate Assistance, Max=Maximal Assistance, Total=Total Assistance, NT=Not Tested    BALANCE: Good Fair+ Fair Fair- Poor NT Comments   Sitting Static [] [] [] [x] [x] []    Sitting Dynamic [] [] [] [] [x] [] Standing Static [] [] [] [] [] [x]    Standing Dynamic [] [] [] [] [] [x]      GAIT: I Mod I S SBA CGA Min Mod Max Total  NT x2 Comments:   Level of Assistance [] [] [] [] [] [] [] [] [] [x] []    Distance N/A    DME N/A    Gait Quality N/A    Weightbearing  Status N/A     I=Independent, Mod I=Modified Independent, S=Supervision, SBA=Standby Assistance, CGA=Contact Guard Assistance,   Min=Minimal Assistance, Mod=Moderate Assistance, Max=Maximal Assistance, Total=Total Assistance, NT=Not Tested    PLAN:   FREQUENCY/DURATION: PT Plan of Care: 3 times/week for duration of hospital stay or until stated goals are met, whichever comes first.  TREATMENT:     TREATMENT:   ($$ Therapeutic Activity: 38-52 mins    )  Therapeutic Activity (38 Minutes): Therapeutic activity included Rolling, Supine to Sit, Sit to Supine, Scooting and positioning for tone reduction and pressure relief as well as upright midline to improve functional Mobility, Strength, ROM and Activity tolerance. At this time, patient is appropriate for Co-treatment with occupational therapy due to patient's decreased overall endurance/tolerance levels, as well as need for high level skilled assistance to complete functional transfers/mobility and functional tasks. Marko Maldonado is appropriate for a multidisciplinary co-treatment of PT and OT to address goals of both disciplines.      TREATMENT GRID:  N/A    AFTER TREATMENT POSITION/PRECAUTIONS:  Alarm Activated, Bed, Needs within reach, RN notified and Rook boots applied B LEs (okay per RN)    INTERDISCIPLINARY COLLABORATION:  RN/PCT, PT/PTA and OT/ALMEIDA    TOTAL TREATMENT DURATION:  PT Patient Time In/Time Out  Time In: 1344  Time Out: 503 Evan Tate, DPT

## 2021-10-12 ENCOUNTER — ANESTHESIA (OUTPATIENT)
Dept: ENDOSCOPY | Age: 81
DRG: 064 | End: 2021-10-12
Payer: MEDICARE

## 2021-10-12 ENCOUNTER — ANESTHESIA EVENT (OUTPATIENT)
Dept: ENDOSCOPY | Age: 81
DRG: 064 | End: 2021-10-12
Payer: MEDICARE

## 2021-10-12 LAB
ANION GAP SERPL CALC-SCNC: 5 MMOL/L (ref 7–16)
BUN SERPL-MCNC: 57 MG/DL (ref 8–23)
CALCIUM SERPL-MCNC: 8.8 MG/DL (ref 8.3–10.4)
CHLORIDE SERPL-SCNC: 114 MMOL/L (ref 98–107)
CO2 SERPL-SCNC: 29 MMOL/L (ref 21–32)
COVID-19 RAPID TEST, COVR: NOT DETECTED
CREAT SERPL-MCNC: 1.64 MG/DL (ref 0.6–1)
GLUCOSE SERPL-MCNC: 135 MG/DL (ref 65–100)
MAGNESIUM SERPL-MCNC: 2 MG/DL (ref 1.8–2.4)
PHOSPHATE SERPL-MCNC: 1.6 MG/DL (ref 2.3–3.7)
POTASSIUM SERPL-SCNC: 3.2 MMOL/L (ref 3.5–5.1)
SODIUM SERPL-SCNC: 148 MMOL/L (ref 136–145)
SOURCE, COVRS: NORMAL

## 2021-10-12 PROCEDURE — 74011250636 HC RX REV CODE- 250/636: Performed by: NURSE ANESTHETIST, CERTIFIED REGISTERED

## 2021-10-12 PROCEDURE — 74011000250 HC RX REV CODE- 250: Performed by: FAMILY MEDICINE

## 2021-10-12 PROCEDURE — 74011250636 HC RX REV CODE- 250/636: Performed by: INTERNAL MEDICINE

## 2021-10-12 PROCEDURE — 83735 ASSAY OF MAGNESIUM: CPT

## 2021-10-12 PROCEDURE — 76040000025: Performed by: INTERNAL MEDICINE

## 2021-10-12 PROCEDURE — 74011250637 HC RX REV CODE- 250/637: Performed by: INTERNAL MEDICINE

## 2021-10-12 PROCEDURE — 84100 ASSAY OF PHOSPHORUS: CPT

## 2021-10-12 PROCEDURE — 2709999900 HC NON-CHARGEABLE SUPPLY

## 2021-10-12 PROCEDURE — 0DH68UZ INSERTION OF FEEDING DEVICE INTO STOMACH, VIA NATURAL OR ARTIFICIAL OPENING ENDOSCOPIC: ICD-10-PCS | Performed by: INTERNAL MEDICINE

## 2021-10-12 PROCEDURE — 74011250636 HC RX REV CODE- 250/636: Performed by: FAMILY MEDICINE

## 2021-10-12 PROCEDURE — 76060000032 HC ANESTHESIA 0.5 TO 1 HR: Performed by: INTERNAL MEDICINE

## 2021-10-12 PROCEDURE — 87635 SARS-COV-2 COVID-19 AMP PRB: CPT

## 2021-10-12 PROCEDURE — 80048 BASIC METABOLIC PNL TOTAL CA: CPT

## 2021-10-12 PROCEDURE — 2709999900 HC NON-CHARGEABLE SUPPLY: Performed by: INTERNAL MEDICINE

## 2021-10-12 PROCEDURE — 77030040704 HC NSL TU RETAIN SYS BRDL PRO AMR -B: Performed by: INTERNAL MEDICINE

## 2021-10-12 PROCEDURE — 74011000250 HC RX REV CODE- 250: Performed by: INTERNAL MEDICINE

## 2021-10-12 PROCEDURE — 77030004950 HC CATH ENTRL NG COVD -A: Performed by: INTERNAL MEDICINE

## 2021-10-12 PROCEDURE — 65270000029 HC RM PRIVATE

## 2021-10-12 RX ORDER — HYDRALAZINE HYDROCHLORIDE 20 MG/ML
10 INJECTION INTRAMUSCULAR; INTRAVENOUS ONCE
Status: COMPLETED | OUTPATIENT
Start: 2021-10-12 | End: 2021-10-12

## 2021-10-12 RX ORDER — PROPOFOL 10 MG/ML
INJECTION, EMULSION INTRAVENOUS AS NEEDED
Status: DISCONTINUED | OUTPATIENT
Start: 2021-10-12 | End: 2021-10-12 | Stop reason: HOSPADM

## 2021-10-12 RX ORDER — SODIUM CHLORIDE, SODIUM LACTATE, POTASSIUM CHLORIDE, CALCIUM CHLORIDE 600; 310; 30; 20 MG/100ML; MG/100ML; MG/100ML; MG/100ML
1000 INJECTION, SOLUTION INTRAVENOUS CONTINUOUS
Status: DISCONTINUED | OUTPATIENT
Start: 2021-10-12 | End: 2021-10-12 | Stop reason: HOSPADM

## 2021-10-12 RX ADMIN — Medication 10 ML: at 21:29

## 2021-10-12 RX ADMIN — PROPOFOL 30 MG: 10 INJECTION, EMULSION INTRAVENOUS at 14:33

## 2021-10-12 RX ADMIN — DEXTROSE MONOHYDRATE AND SODIUM CHLORIDE 150 ML/HR: 5; .45 INJECTION, SOLUTION INTRAVENOUS at 01:28

## 2021-10-12 RX ADMIN — SODIUM CHLORIDE, SODIUM LACTATE, POTASSIUM CHLORIDE, AND CALCIUM CHLORIDE 1000 ML: 600; 310; 30; 20 INJECTION, SOLUTION INTRAVENOUS at 14:01

## 2021-10-12 RX ADMIN — PROPOFOL 30 MG: 10 INJECTION, EMULSION INTRAVENOUS at 14:29

## 2021-10-12 RX ADMIN — SODIUM CHLORIDE, SODIUM LACTATE, POTASSIUM CHLORIDE, AND CALCIUM CHLORIDE: 600; 310; 30; 20 INJECTION, SOLUTION INTRAVENOUS at 14:20

## 2021-10-12 RX ADMIN — Medication 10 ML: at 16:06

## 2021-10-12 RX ADMIN — HYDRALAZINE HYDROCHLORIDE 15 MG: 20 INJECTION INTRAMUSCULAR; INTRAVENOUS at 22:04

## 2021-10-12 RX ADMIN — HYDRALAZINE HYDROCHLORIDE 15 MG: 20 INJECTION INTRAMUSCULAR; INTRAVENOUS at 01:27

## 2021-10-12 RX ADMIN — PROPOFOL 30 MG: 10 INJECTION, EMULSION INTRAVENOUS at 14:42

## 2021-10-12 RX ADMIN — METOPROLOL TARTRATE 5 MG: 5 INJECTION INTRAVENOUS at 10:35

## 2021-10-12 RX ADMIN — PROPOFOL 30 MG: 10 INJECTION, EMULSION INTRAVENOUS at 14:27

## 2021-10-12 RX ADMIN — CARVEDILOL 6.25 MG: 6.25 TABLET, FILM COATED ORAL at 18:15

## 2021-10-12 RX ADMIN — PROPOFOL 30 MG: 10 INJECTION, EMULSION INTRAVENOUS at 14:37

## 2021-10-12 RX ADMIN — HYDRALAZINE HYDROCHLORIDE 10 MG: 20 INJECTION INTRAMUSCULAR; INTRAVENOUS at 02:39

## 2021-10-12 RX ADMIN — PROPOFOL 30 MG: 10 INJECTION, EMULSION INTRAVENOUS at 14:46

## 2021-10-12 RX ADMIN — Medication 10 ML: at 06:15

## 2021-10-12 NOTE — PROCEDURES
PROCEDURE: Esophagogastroduodenoscopy with NG tube placement    ENDOSCOPIST: Hank Blanco M.D. PREOPERATIVE DIAGNOSIS: Feeding difficulty    POSTOPERATIVE DIAGNOSIS: Hiatal hernia, gastritis    INSTRUMENTS: [id number]    SEDATION: MAC    DESCRIPTION: After informed consent was obtained, the patient was taken to the endoscopy suite and placed in the left lateral decubitus position. Intravenous sedation was administered by the Anesthesia provider. After adequate sedation had been achieved the endoscope was inserted over the tongue and through the posterior pharynx under direct visualization down the esophagus to the stomach and into the second portion of the duodenum. The endoscopic was withdrawn from that point, performing a careful survey of the mucosa. Retroflexion was performed in the gastric fundus. FINDINGS:  Oropharynx:  Copious dried secretions cleared with combination of suction and Anisa forceps    Esophagus: Normal.    Stomach: The entire stomach appeared initially above the diaphragm with possible organoaxial gastric volvulus configuration. This was reduced endoscopically. The mucosa showed mild erythema and friability. The Kiana Max feeding tube was advanced through the right nare and guided with the scope through the esophagus and stomach. The tip was advanced through the pylorus into the duodenum. The scope was carefully withdrawn, leaving the feeding tube in position. The tube was secured with a bridle device. The guidewire was removed from the tube. The stomach was investigated for possible PEG site. No gastric compression could be demonstrated. Duodenum: Normal    Estimated blood loss:  0 cc-minimal    IMPRESSION: Successful placement of flexible feeding tube into the duodenum. The tip may fall back into the gastric antrum, but this should not cause any problems. The prior attempts were probably successful, since the entire stomach appears to be supradiaphragmatic.     PLAN: Ok to use tube now for feeding and meds. No need for xray. PEG will not be possible in this patient. If percutaneous access becomes necessary, placement by IR or Surgery will be required.       Taryn Romero MD

## 2021-10-12 NOTE — PROGRESS NOTES
Physical Therapy Note:    Attempted to see patient this PM for physical therapy treatment session. Patient currently off of the floor at [facility]. Will follow and re-attempt as schedule permits/patient available.  Thank you,    Antonietta Chiu, PT, DPT  10/12/21 14:11PM

## 2021-10-12 NOTE — ANESTHESIA POSTPROCEDURE EVALUATION
Procedure(s):  ESOPHAGOGASTRODUODENOSCOPY (EGD) with NG tube placement Room 715  GASTROSTOMY TUBE INSERTION.     total IV anesthesia    Anesthesia Post Evaluation      Multimodal analgesia: multimodal analgesia used between 6 hours prior to anesthesia start to PACU discharge  Patient location during evaluation: PACU  Patient participation: complete - patient participated  Level of consciousness: awake and alert  Pain management: adequate  Airway patency: patent  Anesthetic complications: no  Cardiovascular status: acceptable  Respiratory status: acceptable  Hydration status: acceptable  Post anesthesia nausea and vomiting:  none  Final Post Anesthesia Temperature Assessment:  Normothermia (36.0-37.5 degrees C)      INITIAL Post-op Vital signs:   Vitals Value Taken Time   /82 10/12/21 1520   Temp 36.7 °C (98 °F) 10/12/21 1459   Pulse 75 10/12/21 1520   Resp 14 10/12/21 1520   SpO2 97 % 10/12/21 1520

## 2021-10-12 NOTE — PROGRESS NOTES
Feeding tube placement complete; see note for details. GI signing off. Please call as needed. PEG placement was investigated during this procedure and will not be possible. If gastrostomy is needed in the future, IR or Surgery should be able to accomplish this.

## 2021-10-12 NOTE — PROGRESS NOTES
[facility] Progress Note         Admit Date:  10/3/2021    Today's Date:  10/12/2021    CC:  NGT placement    Subjective:     Patient is confused and hands in mitten restraints. Eyes closed and not responsive to questions. RN states they have attempted NG tube placement multiple times, but unable to place. She has not been noted to have bleeding, nausea, or vomiting. Medications:   Current Facility-Administered Medications   Medication Dose Route Frequency    NUTRITIONAL SUPPORT ELECTROLYTE PRN ORDERS   Does Not Apply PRN    metoprolol (LOPRESSOR) injection 5 mg  5 mg IntraVENous Q15MIN PRN    levothyroxine (SYNTHROID) tablet 112 mcg  112 mcg Per G Tube ACB    amLODIPine (NORVASC) tablet 10 mg  10 mg Per G Tube DAILY    carvediloL (COREG) tablet 6.25 mg  6.25 mg Per G Tube BID WITH MEALS    cloNIDine (CATAPRES) 0.3 mg/24 hr patch 1 Patch  1 Patch TransDERmal Q7D    hydrALAZINE (APRESOLINE) 20 mg/mL injection 15 mg  15 mg IntraVENous Q4H PRN    central line flush (saline) syringe 10 mL  10 mL InterCATHeter Q8H    [Held by provider] furosemide (LASIX) injection 40 mg  40 mg IntraVENous BID    [Held by provider] QUEtiapine (SEROquel) tablet 12.5 mg  12.5 mg Oral QHS    acetaminophen (TYLENOL) tablet 650 mg  650 mg Oral Q4H PRN    [Held by provider] levETIRAcetam (KEPPRA) 500 mg in 0.9% sodium chloride (MBP/ADV) 100 mL MBP  500 mg IntraVENous Q12H    [Held by provider] divalproex (DEPAKOTE SPRINKLE) capsule 125 mg  125 mg Oral Q12H    LORazepam (ATIVAN) injection 1 mg  1 mg IntraVENous Q4H PRN    sodium chloride (NS) flush 5-40 mL  5-40 mL IntraVENous PRN    ondansetron (ZOFRAN) injection 8 mg  8 mg IntraVENous Q8H PRN       Review of Systems:  ROS was unable to be obtained due to confusion with pertinent positives as listed above.     Diet:  NPO    Objective:   Vitals:  Visit Vitals  BP (!) 148/72 (BP 1 Location: Left lower arm, BP Patient Position: Supine)   Pulse 96   Temp 98 °F (36.7 °C)   Resp 22   Ht 5' 5\" (1.651 m)   Wt 73.3 kg (161 lb 8 oz)   SpO2 92%   BMI 26.88 kg/m²     Intake/Output:  No intake/output data recorded. 10/10 1901 - 10/12 0700  In: -   Out: 950 [Urine:950]  Exam:  General appearance: confusion, not responsive, no distress, lying in bed, hands in mitten restraints  Lungs: coarse BS to auscultation bilaterally anteriorly  Heart: regular rate and rhythm  Abdomen: soft, non-tender. Bowel sounds normal. No masses, no organomegaly  Neuro:  confusion, not responsive    Data Review (Labs):    Recent Labs     10/12/21  0711 10/11/21  0445 10/10/21  0440 10/09/21  2230 10/09/21  2135   WBC  --   --   --  9.2  --    HGB  --   --   --  14.0  --    HCT  --   --   --  43.2  --    PLT  --   --   --  293  --    MCV  --   --   --  96.6  --    * 148* 144  --   --    K 3.2* 3.9 3.9  --  3.4*   * 113* 106  --   --    CO2 29 29 32  --   --    BUN 57* 79* 70*  --   --    CREA 1.64* 2.33* 2.12*  --   --    CA 8.8 8.9 9.2  --   --    MG 2.0 2.4 2.4  --  2.3   * 89 104*  --   --      ECHO Interpretation Summary 7 Oct 2021  · TV: Mild to moderate tricuspid valve regurgitation is present. Right Ventricular Arterial Pressure (RVSP) is 58 mmHg. Pulmonary hypertension found to be moderate. · IVC: Moderately elevated central venous pressure (8 mmHg); IVC diameter is larger than 21 mm and collapses more than 50% with respiration. · LV: Estimated LVEF is 60 - 65%. Normal cavity size and systolic function (ejection fraction normal). Mild concentric hypertrophy. Left ventricular diastolic dysfunction. · LA: Mildly dilated left atrium. Left Atrium volume index is 36 mL/m2. · MV: Mitral valve thickening. Mitral valve leaflet calcification without reduced excursion. Mild mitral annular calcification. Mild mitral valve regurgitation is present.  MR may be more severe, eccentric, difficult to visualize with patient agitation and respiratory interference, clinical correlation recommended. · Pericardium: Trivial anterior posterior pericardial effusion. Abdomen x-ray 10 Oct 2021  HISTORY: NGT placement  TECHNIQUE: Frontal abdomen. COMPARISON: 10/9/2021  FINDINGS:    A nasogastric tube is again noted seen curled over the lower mid chest.   Contrast was administered and may be contained within a hiatal hernia. Is this  patient known to have a hiatal hernia? There appears to be gastric mucosa. I  would confirm this prior to further use of this feeding tube. IMPRESSION  1. Findings as described above. Assessment:     Principal Problem:    Hemorrhage of right temporal lobe ([address] (10/10/2021)    Active Problems:    Hypothyroidism (6/29/2012)      Dementia ([address] (10/3/2021)      Poorly-controlled hypertension (10/3/2021)      LEODAN (acute kidney injury) ([address] (10/10/2021)      Acute metabolic encephalopathy (24/88/3442)      Pleural effusion, bilateral (10/11/2021)      Chronic diastolic congestive heart failure ([address] (10/11/2021)      PAF (paroxysmal atrial fibrillation) ([address] (10/11/2021)    77 yo female, now pt of Dr. Mo Porras, with PMH including but not limited to HTN, dementia, hypothyroidism, moderate pulmonary hypertension, diastolic heart failure, who was seen in consult 11 Oct 2021 for NG tube placement, after admitted with headache and confusion with finding of right temporal intraparenchymal hemorrhage and subdural hematoma that was not felt to be a surgical issue. 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 Previously documented proximal right ICA aneurysm is not felt to have contributed to bleed. BP remains elevated. Newly diagnosed paroxsymal A.fib this admission. LEODAN on CKD. NG tube has been attempted multiple times, but imaging after attempts of bedside placement of NGT resulted in coiling of tube in mid to lower chest suggestive of possible hiatal hernia. NG tube needed for nutritional support and medications given current feeding difficulty.     I discussed with her  Jose Mishra at [phone] and her daughter Yasemin Vanegas at [phone]. Plan:     - Supportive care, IVF.  - EGD today for NG tube placement today. Discussed risks including but not limited to bleeding, perforation, acute cardiopulmonary event, sedation risks, IV complications, aspiration, and pt's  and daughter state understanding and agree to proceed. - NPO.  - Follow. Patient is seen and examined in collaboration with Dr. Beatriz Lennox. Assessment and plan as per Dr. Richard Horn Gut  [facility]

## 2021-10-12 NOTE — ANESTHESIA PREPROCEDURE EVALUATION
Relevant Problems   No relevant active problems       Anesthetic History               Review of Systems / Medical History  Patient summary reviewed and pertinent labs reviewed    Pulmonary                   Neuro/Psych         Dementia    Comments: Intracranial hemorrhage. Confused Cardiovascular    Hypertension        Dysrhythmias (paroxysmal) : atrial fibrillation      Exercise tolerance: >4 METS     GI/Hepatic/Renal         Renal disease: ARF       Endo/Other      Hypothyroidism       Other Findings            Physical Exam    Airway      Neck ROM: decreased range of motion        Cardiovascular  Regular rate and rhythm,  S1 and S2 normal,  no murmur, click, rub, or gallop             Dental         Pulmonary  Breath sounds clear to auscultation               Abdominal         Other Findings            Anesthetic Plan    ASA: 4  Anesthesia type: total IV anesthesia            Anesthetic plan and risks discussed with: Patient      Patient confused. Hx obtained from chart.

## 2021-10-12 NOTE — PROGRESS NOTES
Letter written and signed by PCP. Given to PSR at - patient informed of this. Also informed patient we do close at noon today.   TRANSFER - IN REPORT:    Verbal report received from [facility] on Zachary Fuentes  being received from  for ordered procedure      Report consisted of patients Situation, Background, Assessment and   Recommendations(SBAR). Information from the following report(s) SBAR, Intake/Output, MAR, Recent Results, Med Rec Status and Pre Procedure Checklist was reviewed with the receiving nurse. Opportunity for questions and clarification was provided.

## 2021-10-12 NOTE — PROGRESS NOTES
10/11/21 1908   NIH Stroke Scale   Interval Handoff/Transfer  (NIH w/ MAHENDRA Bro)   Level of Conciousness (1a) (!) 2   LOC Questions (1b) (!) 2   LOC Commands (1c) (!) 2   Best Gaze (2) 0   Visual (3) 0   Facial Palsy (4) 0   Motor Arm, Left (5a) (!) 1   Motor Arm, Right (5b) (!) 1   Motor Leg, Left (6a) (!) 2   Motor Leg, Right (6b) (!) 2   Limb Ataxia (7) 0   Sensory (8) (!) 1   Best Language (9) (!) 3   Dysarthria (10) (!) 2   Extinction and Inattention (11) 0   Total 18

## 2021-10-12 NOTE — PROGRESS NOTES
6 F dobhoff tube inserted with direct visualization endoscopic by Dr. Kale Delvalle with no complications.

## 2021-10-12 NOTE — PROGRESS NOTES
Hospitalist Progress Note   Admit Date:  10/3/2021  2:16 AM   Name:  Anthony Law   Age:  78 y.o. Sex:  female  :  1940   MRN:  [id number]   Room:  [facility]    Presenting Complaint: Headache    Reason(s) for Admission: Intracranial bleed ([address] [I62.9]  Dementia ([address] [F03.90]  Poorly-controlled hypertension [I10]  Hypothyroidism [E03.9]  Encephalopathy, unspecified [G93.40]     Hospital Course & Interval History:     Patient with past medical history of  Hypertension  Dementia  Hypothyroidism    Patient presented to ER on October 3 due to headache and confusion. CT head showed right temporal IPH. 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 Neurosurgery evaluated the patient and did not feel that the bleeding was related to aneurysm. Patient was admitted to ICU. On October 3, code as was called when she developed symptoms related to increased intracranial pressure. She was given mannitol, and Cardene. She developed intermittent delirium and agitation. She started on Depakote and Seroquel. She continues to be encephalopathic. Subjective (10/12/21): Patient is lying quietly in bed. Not answering question. Not appearing to be in overt distress. No fever. Assessment & Plan:     Principal Problem:    Hemorrhage of right temporal lobe ([address] (10/10/2021)  With several subsequent CT of the brain, showing stable hemorrhage. Avoid antiplatelet, anticoagulants. Continue physical therapy. CTA showed aneurysm but not in the area of her hemorrhage. Not a candidate for surgical intervention at this time. Not much improvement in terms of her encephalopathy. Not eating. Having problem with NG tube placement . GI is consulted. May need long-term solution to her feeding problem by having feeding tube put in. We will need to address this  with family.      Active Problems:    Hypothyroidism (2012)  On supplemental levothyroxine      Dementia ([facility] (10/3/2021)        Poorly-controlled hypertension (10/3/2021)  On amlodipine, carvedilol, clonidine patch      LEODAN (acute kidney injury) ([facility] (10/10/2021)  Improved creatinine. Monitor renal function and intake and output. Avoid nephrotoxic agents. Acute metabolic encephalopathy (76/66/1077)  From intracranial hemorrhage. Not much improved. Will see how her condition will involve further. We will need to address long-term care, including feeding choices. Pleural effusion, bilateral (10/11/2021)  Small. Monitor. Chronic diastolic congestive heart failure ([facility] (10/11/2021)  No decompensation. Monitor. PAF (paroxysmal atrial fibrillation) ([facility] (10/11/2021)  Not a candidate for anticoagulation        Dispo/Discharge Planning: To be determined. Likely will need placement    Diet:  DIET NPO  ADULT TUBE FEEDING Nasogastric; Standard with Fiber; Delivery Method: Continuous; Continuous Initial Rate (mL/hr): 10; Continuous Advance Tube Feeding: Yes; Advancement Volume (mL/hr): 10; Advancement Frequency: Other (Specify); Specify Other Adva. ..   DVT PPx: SCD   Code status: Full Code    Hospital Problems as of 10/12/2021 Never Reviewed        Codes Class Noted - Resolved POA    Pleural effusion, bilateral ICD-10-CM: J90  ICD-9-CM: 511.9  10/11/2021 - Present Yes        Chronic diastolic congestive heart failure (HCC) ICD-10-CM: I50.32  ICD-9-CM: 428.32, 428.0  10/11/2021 - Present Yes        PAF (paroxysmal atrial fibrillation) (HCC) ICD-10-CM: I48.0  ICD-9-CM: 427.31  10/11/2021 - Present Yes        LEODAN (acute kidney injury) ([facility] ICD-10-CM: N17.9  ICD-9-CM: 584.9  10/10/2021 - Present No        Acute metabolic encephalopathy KYO-52-IB: G93.41  ICD-9-CM: 348.31  10/10/2021 - Present Yes        * (Principal) Hemorrhage of right temporal lobe ([facility] ICD-10-CM: I61.1  ICD-9-CM: 431  10/10/2021 - Present Yes        Dementia ([facility] ICD-10-CM: F03.90  ICD-9-CM: 294.20  10/3/2021 - Present Yes        Poorly-controlled hypertension ICD-10-CM: I10  ICD-9-CM: 401.9  10/3/2021 - Present Yes        Hypothyroidism ICD-10-CM: E03.9  ICD-9-CM: 244.9  6/29/2012 - Present Yes        RESOLVED: Acute pulmonary edema with congestive heart failure (HCC) ICD-10-CM: I50.1  ICD-9-CM: 428.1  10/11/2021 - 10/11/2021 Clinically Undetermined        RESOLVED: Atrial fibrillation with RVR (HCC) ICD-10-CM: I48.91  ICD-9-CM: 427.31  10/10/2021 - 10/11/2021 Clinically Undetermined        RESOLVED: UTI (urinary tract infection) ICD-10-CM: N39.0  ICD-9-CM: 599.0  7/2/2012 - 10/11/2021 Yes              Objective:     Patient Vitals for the past 24 hrs:   Temp Pulse Resp BP SpO2   10/12/21 1200 98.5 °F (36.9 °C) 74 20 (!) 161/88 95 %   10/12/21 1035  (!) 158      10/12/21 0800 98 °F (36.7 °C) 96 22 (!) 148/72 92 %   10/12/21 0400 98.2 °F (36.8 °C) 96 22 (!) 151/80 96 %   10/12/21 0222  97  (!) 156/80    10/12/21 0000 98.3 °F (36.8 °C) 88 25 (!) 169/83 97 %   10/11/21 2233  91  (!) 154/83    10/11/21 2000 97.6 °F (36.4 °C) 83 24 (!) 169/89 93 %   10/11/21 1756  (!) 154      10/11/21 1600 97.3 °F (36.3 °C) 66 18 (!) 119/48 91 %     Oxygen Therapy  O2 Sat (%): 95 % (10/12/21 1200)  Pulse via Oximetry: 85 beats per minute (10/10/21 1411)  O2 Device: Nasal cannula (10/09/21 2255)  Skin Assessment: Other (see comment/note) (no breakdown; mucosa dry) (10/09/21 1900)  Skin Protection for O2 Device: N/A (10/03/21 2001)  O2 Flow Rate (L/min): 3 l/min (10/09/21 2255)  O2 Temperature: 35.6 °F (2 °C) (10/09/21 0705)    Estimated body mass index is 26.88 kg/m² as calculated from the following:    Height as of this encounter: 5' 5\" (1.651 m). Weight as of this encounter: 73.3 kg (161 lb 8 oz).     Intake/Output Summary (Last 24 hours) at 10/12/2021 1259  Last data filed at 10/12/2021 0641  Gross per 24 hour   Intake    Output 700 ml   Net -700 ml         Physical Exam: Visit Vitals  BP (!) 161/88 (BP 1 Location: Left lower arm, BP Patient Position: Supine)   Pulse 74   Temp 98.5 °F (36.9 °C)   Resp 20   Ht 5' 5\" (1.651 m)   Wt 73.3 kg (161 lb 8 oz)   SpO2 95%   BMI 26.88 kg/m²        Blood pressure (!) 161/88, pulse 74, temperature 98.5 °F (36.9 °C), resp. rate 20, height 5' 5\" (1.651 m), weight 73.3 kg (161 lb 8 oz), SpO2 95 %. General:    Well-nourished. Not answering question. Lying quietly in bed. Not in respiratory distress  Head:  Normocephalic, atraumatic, pale, no jaundice  Eyes:  Sclerae appear normal.  Pupils equally round. ENT:  Nares appear normal, no drainage. Moist oral mucosa  Neck:  No restricted ROM. Trachea midline   CV:   RRR. No m/r/g. No jugular venous distension. Lungs:   CTAB. No wheezing, rhonchi, or rales. Respirations even, unlabored  Abdomen: Bowel sounds present. Soft, nontender, nondistended. Extremities: No cyanosis or clubbing. No edema  Skin:     No rashes and normal coloration. Warm and dry. Neuro:  CN II-XII grossly intact.    Appears to be moving extremities       I have reviewed ordered lab tests and independently visualized imaging below:    Recent Labs:  Recent Results (from the past 48 hour(s))   METABOLIC PANEL, BASIC    Collection Time: 10/11/21  4:45 AM   Result Value Ref Range    Sodium 148 (H) 136 - 145 mmol/L    Potassium 3.9 3.5 - 5.1 mmol/L    Chloride 113 (H) 98 - 107 mmol/L    CO2 29 21 - 32 mmol/L    Anion gap 6 (L) 7 - 16 mmol/L    Glucose 89 65 - 100 mg/dL    BUN 79 (H) 8 - 23 MG/DL    Creatinine 2.33 (H) 0.6 - 1.0 MG/DL    GFR est AA 26 (L) >60 ml/min/1.73m2    GFR est non-AA 21 (L) >60 ml/min/1.73m2    Calcium 8.9 8.3 - 10.4 MG/DL   MAGNESIUM    Collection Time: 10/11/21  4:45 AM   Result Value Ref Range    Magnesium 2.4 1.8 - 2.4 mg/dL   METABOLIC PANEL, BASIC    Collection Time: 10/12/21  7:11 AM   Result Value Ref Range    Sodium 148 (H) 136 - 145 mmol/L    Potassium 3.2 (L) 3.5 - 5.1 mmol/L Chloride 114 (H) 98 - 107 mmol/L    CO2 29 21 - 32 mmol/L    Anion gap 5 (L) 7 - 16 mmol/L    Glucose 135 (H) 65 - 100 mg/dL    BUN 57 (H) 8 - 23 MG/DL    Creatinine 1.64 (H) 0.6 - 1.0 MG/DL    GFR est AA 38 (L) >60 ml/min/1.73m2    GFR est non-AA 32 (L) >60 ml/min/1.73m2    Calcium 8.8 8.3 - 10.4 MG/DL   MAGNESIUM    Collection Time: 10/12/21  7:11 AM   Result Value Ref Range    Magnesium 2.0 1.8 - 2.4 mg/dL   PHOSPHORUS    Collection Time: 10/12/21  7:11 AM   Result Value Ref Range    Phosphorus 1.6 (L) 2.3 - 3.7 MG/DL   COVID-19 RAPID TEST    Collection Time: 10/12/21  9:26 AM   Result Value Ref Range    Specimen source NASAL      COVID-19 rapid test Not detected NOTD         All Micro Results     Procedure Component Value Units Date/Time    COVID-19 RAPID TEST [[id number]] Collected: 10/12/21 0926    Order Status: Completed Specimen: Nasopharyngeal Updated: 10/12/21 1014     Specimen source NASAL        COVID-19 rapid test Not detected        Comment:      The specimen is NEGATIVE for SARS-CoV-2, the novel coronavirus associated with COVID-19. A negative result does not rule out COVID-19. This test has been authorized by the FDA under an Emergency Use Authorization (EUA) for use by authorized laboratories. Fact sheet for Healthcare Providers: ConventionUpdate.co.nz  Fact sheet for Patients: ConventionUpdate.co.nz       Methodology: Isothermal Nucleic Acid Amplification         CULTURE, URINE [[id number]]  (Abnormal)  (Susceptibility) Collected: 10/04/21 2107    Order Status: Completed Specimen: Cath Urine Updated: 10/07/21 7887     Special Requests: NO SPECIAL REQUESTS        Culture result:       >100,000 COLONIES/mL KLEBSIELLA PNEUMONIAE                Other Studies:  No results found.     Current Meds:  Current Facility-Administered Medications   Medication Dose Route Frequency    NUTRITIONAL SUPPORT ELECTROLYTE PRN ORDERS   Does Not Apply PRN    metoprolol (LOPRESSOR) injection 5 mg  5 mg IntraVENous Q15MIN PRN    levothyroxine (SYNTHROID) tablet 112 mcg  112 mcg Per G Tube ACB    amLODIPine (NORVASC) tablet 10 mg  10 mg Per G Tube DAILY    carvediloL (COREG) tablet 6.25 mg  6.25 mg Per G Tube BID WITH MEALS    cloNIDine (CATAPRES) 0.3 mg/24 hr patch 1 Patch  1 Patch TransDERmal Q7D    hydrALAZINE (APRESOLINE) 20 mg/mL injection 15 mg  15 mg IntraVENous Q4H PRN    central line flush (saline) syringe 10 mL  10 mL InterCATHeter Q8H    [Held by provider] furosemide (LASIX) injection 40 mg  40 mg IntraVENous BID    [Held by provider] QUEtiapine (SEROquel) tablet 12.5 mg  12.5 mg Oral QHS    acetaminophen (TYLENOL) tablet 650 mg  650 mg Oral Q4H PRN    [Held by provider] levETIRAcetam (KEPPRA) 500 mg in 0.9% sodium chloride (MBP/ADV) 100 mL MBP  500 mg IntraVENous Q12H    [Held by provider] divalproex (DEPAKOTE SPRINKLE) capsule 125 mg  125 mg Oral Q12H    LORazepam (ATIVAN) injection 1 mg  1 mg IntraVENous Q4H PRN    sodium chloride (NS) flush 5-40 mL  5-40 mL IntraVENous PRN    ondansetron (ZOFRAN) injection 8 mg  8 mg IntraVENous Q8H PRN       Signed:  Lenin More MD    Part of this note may have been written by using a voice dictation software. The note has been proof read but may still contain some grammatical/other typographical errors.

## 2021-10-12 NOTE — PROGRESS NOTES
TRANSFER - OUT REPORT:    Verbal report given to John Shin RN(name) on José Miguel Garcia  being transferred to 715(unit) for routine post - op EGD guided dobhoff tube placement. Report consisted of patients Situation, Background, Assessment and   Recommendations(SBAR). Information from the following report(s) SBAR, Procedure Summary and Recent Results was reviewed with the receiving nurse. Lines:   PICC Triple Lumen 10/09/21 Right;Brachial (Active)   Central Line Being Utilized Yes 10/11/21 2000   Criteria for Appropriate Use Limited/no vessel suitable for conventional peripheral access 10/11/21 2000   Site Assessment Clean, dry, & intact 10/11/21 2000   Phlebitis Assessment 0 10/11/21 2000   Infiltration Assessment 0 10/11/21 2000   Arm Circumference (cm) 28 cm 10/11/21 0800   Date of Last Dressing Change 10/09/21 10/11/21 0800   Dressing Status Clean, dry, & intact 10/11/21 2000   External Catheter Length (cm) 5 centimeters 10/11/21 0800   Dressing Type Disk with Chlorhexadine gluconate (CHG); Transparent 10/11/21 2000   Action Taken Catheter repositioned;Dressing changed 10/09/21 1845   Hub Color/Line Status Red;Patent 10/11/21 2000   Positive Blood Return (Site #1) Yes 10/11/21 2000   Hub Color/Line Status White;Patent 10/11/21 2000   Positive Blood Return (Site #2) Yes 10/11/21 2000   Hub Color/Line Status Gray;Patent 10/11/21 2000   Positive Blood Return (Site #3) Yes 10/11/21 2000   Alcohol Cap Used No 10/11/21 0800        Opportunity for questions and clarification was provided.       Patient transported with:   O2 @ 3 liters

## 2021-10-12 NOTE — PROGRESS NOTES
[facility] CRITICAL CARE OUTREACH NURSE PROGRESS REPORT      SUBJECTIVE: Called to assess patient secondary to outreach protocol. MEWS Score: 2 (10/11/21 2000)  Vitals:    10/11/21 1600 10/11/21 1756 10/11/21 2000 10/11/21 2233   BP: (!) 119/48  (!) 169/89 (!) 154/83   Pulse: 66 (!) 154 83 91   Resp: 18  24    Temp: 97.3 °F (36.3 °C)  97.6 °F (36.4 °C)    SpO2: 91%  93%    Weight:       Height:            LAB DATA:    Recent Labs     10/11/21  0445 10/10/21  0440 10/09/21  2135 10/09/21  0557 10/09/21  0557   * 144  --   --  142   K 3.9 3.9 3.4*   < > 3.4*   * 106  --   --  104   CO2 29 32  --   --  28   AGAP 6* 6*  --   --  10   GLU 89 104*  --   --  103*   BUN 79* 70*  --   --  46*   CREA 2.33* 2.12*  --   --  1.59*   GFRAA 26* 29*  --   --  40*   GFRNA 21* 24*  --   --  33*   CA 8.9 9.2  --   --  9.5   MG 2.4 2.4 2.3  --   --     < > = values in this interval not displayed. Recent Labs     10/09/21  2230   WBC 9.2   HGB 14.0   HCT 43.2             OBJECTIVE: On arrival to room, I found patient to be restless in bed with primary RN at bedside. Pain Assessment  Pain Intensity 1: 1 (10/11/21 1344)  Pain Location 1: Back  Pain Intervention(s) 1: Emotional support, Position  Patient Stated Pain Goal: Unable to verbalize/indicate pain                                 ASSESSMENT:  Pt is restless in bed with eyes closed. Pt moans when asked a question. Pt does not open eyes to command. NIH= 18. Pt is moving all extremities. No posturing noted at this time. PLAN:  Will continue to monitor per protocol.

## 2021-10-12 NOTE — PROGRESS NOTES
10/12/21 1902   NIH Stroke Scale   Interval Handoff/Transfer  (NIH w/ MAHENDRA Bro)   Level of Conciousness (1a) (!) 2   LOC Questions (1b) (!) 2   LOC Commands (1c) (!) 2   Best Gaze (2) 0   Visual (3) 0   Facial Palsy (4) 0   Motor Arm, Left (5a) (!) 1   Motor Arm, Right (5b) (!) 1   Motor Leg, Left (6a) (!) 2   Motor Leg, Right (6b) (!) 2   Limb Ataxia (7) 0   Sensory (8) (!) 1   Best Language (9) (!) 3   Dysarthria (10) (!) 2   Extinction and Inattention (11) 0   Total 18

## 2021-10-12 NOTE — H&P
[facility] Consult Note       Referring Physician:  Dr. Wily Szymanski Date:  10/11/2021    Admit Date:  10/3/2021    Chief Complaint:  NGT placement    Subjective:     History of Present Illness:  Patient is a 78 y.o. female with history of HTN, dementia, hypothyroidism, moderate pulmonary hypertension, diastolic heart failure, admitted with headache and confusion with finding of right temporal intraparenchymal hemorrhage and subdural hematoma that is not felt to be a surgical issue who is seen in consultation at the request of Dr. Cindy Jeffrey for NGT placement. Imaging after attempts of bedside placement of NGT resulted in coiling of tube in mid to lower chest suggestive of possible hiatal hernia. Neurosurgery is not planning intervention. 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 Previously documented proximal right ICA aneurysm is not felt to have contributed to bleed. BP remains elevated. Newly diagnosed paroxsymal A.fib this admission. LEODAN on CKD. Patient remains confused and is unable to provide history. Baseline for mental status is not known. PMH:  Past Medical History:   Diagnosis Date    Endocrine disease     hypothyroidism    Hypertension     Other ill-defined conditions     chronic back pain. Dementia  Pulmonary hypertension  Right ICA aneurysm  Paroxsymal atrial fibrillation    PSH:  No past surgical history on file. Allergies:  No Known Allergies    Home Medications:  Prior to Admission medications    Medication Sig Start Date End Date Taking? Authorizing Provider   calcium-vitamin D (OYSTER SHELL) 500 mg(1,250mg) -200 unit per tablet Take 1 Tab by mouth three (3) times daily (with meals). 7/3/12   Durga Dodd, DO   traMADol (ULTRAM) 50 mg tablet Take 1 Tab by mouth every six (6) hours as needed for Pain. 7/3/12   Karla Dodd, DO   amLODIPine (NORVASC) 5 mg tablet Take 5 mg by mouth daily.       Other, MD Rey   levothyroxine (SYNTHROID) 112 mcg tablet Take 112 mcg by mouth Daily (before breakfast). Other, MD Rey       Hospital Medications:  Current Facility-Administered Medications   Medication Dose Route Frequency    dextrose 5 % - 0.45% NaCl infusion  150 mL/hr IntraVENous CONTINUOUS    NUTRITIONAL SUPPORT ELECTROLYTE PRN ORDERS   Does Not Apply PRN    metoprolol (LOPRESSOR) injection 5 mg  5 mg IntraVENous Q15MIN PRN    levothyroxine (SYNTHROID) tablet 112 mcg  112 mcg Per G Tube ACB    amLODIPine (NORVASC) tablet 10 mg  10 mg Per G Tube DAILY    carvediloL (COREG) tablet 6.25 mg  6.25 mg Per G Tube BID WITH MEALS    cloNIDine (CATAPRES) 0.3 mg/24 hr patch 1 Patch  1 Patch TransDERmal Q7D    hydrALAZINE (APRESOLINE) 20 mg/mL injection 15 mg  15 mg IntraVENous Q4H PRN    central line flush (saline) syringe 10 mL  10 mL InterCATHeter Q8H    [Held by provider] furosemide (LASIX) injection 40 mg  40 mg IntraVENous BID    [Held by provider] QUEtiapine (SEROquel) tablet 12.5 mg  12.5 mg Oral QHS    acetaminophen (TYLENOL) tablet 650 mg  650 mg Oral Q4H PRN    [Held by provider] levETIRAcetam (KEPPRA) 500 mg in 0.9% sodium chloride (MBP/ADV) 100 mL MBP  500 mg IntraVENous Q12H    [Held by provider] divalproex (DEPAKOTE SPRINKLE) capsule 125 mg  125 mg Oral Q12H    LORazepam (ATIVAN) injection 1 mg  1 mg IntraVENous Q4H PRN    sodium chloride (NS) flush 5-40 mL  5-40 mL IntraVENous PRN    ondansetron (ZOFRAN) injection 8 mg  8 mg IntraVENous Q8H PRN       Social History:  Social History     Tobacco Use    Smoking status: Never Smoker   Substance Use Topics    Alcohol use: No       Family History:  No family history on file. Review of Systems:  Unable to assess due to dementia and encephalopathy    Diet:  NPO.     Objective:     Physical Exam:  Vitals:  Visit Vitals  BP (!) 154/83 (BP 1 Location: Left upper arm)   Pulse 91   Temp 97.6 °F (36.4 °C)   Resp 24   Ht 5' 5\" (1.651 m)   Wt 71.5 kg (157 lb 9.6 oz)   SpO2 93%   BMI 26.23 kg/m²     Gen:  NAD  HEENT: Sclerae anicteric. MM dry  Cardiovascular: Regular rate and rhythm. Respiratory:   Bibasilar rales  GI: soft, NT, ND, +BS  Rectal:  Deferred  Neurological:  Altered, responds to painful stimuli, unable to answer questions      Laboratory:    Recent Labs     10/11/21  0445 10/10/21  0440 10/09/21  2230 10/09/21  2135 10/09/21  0557   WBC  --   --  9.2  --   --    HGB  --   --  14.0  --   --    HCT  --   --  43.2  --   --    PLT  --   --  293  --   --    MCV  --   --  96.6  --   --    * 144  --   --  142   K 3.9 3.9  --  3.4* 3.4*   * 106  --   --  104   CO2 29 32  --   --  28   BUN 79* 70*  --   --  46*   CREA 2.33* 2.12*  --   --  1.59*   CA 8.9 9.2  --   --  9.5   MG 2.4 2.4  --  2.3  --    GLU 89 104*  --   --  103*          Assessment:       Principal Problem:    Hemorrhage of right temporal lobe (HCC) (10/10/2021)    Active Problems:    Hypothyroidism (6/29/2012)      Dementia ([facility] (10/3/2021)      Poorly-controlled hypertension (10/3/2021)      LEODAN (acute kidney injury) ([facility] (10/10/2021)      Acute metabolic encephalopathy (81/77/6499)      Pleural effusion, bilateral (10/11/2021)      Chronic diastolic congestive heart failure (HCC) (10/11/2021)      PAF (paroxysmal atrial fibrillation) ([facility]) (10/11/2021)        Plan:       1. Keep NPO  2.  Will reassess in AM for EGD for NGT placement    Anna Ryan MD

## 2021-10-12 NOTE — PROGRESS NOTES
10/12/21 1057   NIH Stroke Scale   Interval Handoff/Transfer  (Dual NIH w/ Fransisca Ott RN)   Level of Conciousness (1a) (!) 2   LOC Questions (1b) (!) 2   LOC Commands (1c) (!) 2   Best Gaze (2) 0   Visual (3) 0   Facial Palsy (4) 0   Motor Arm, Left (5a) (!) 1   Motor Arm, Right (5b) (!) 1   Motor Leg, Left (6a) (!) 2   Motor Leg, Right (6b) (!) 2   Limb Ataxia (7) 0   Sensory (8) (!) 1   Best Language (9) (!) 3   Dysarthria (10) (!) 2   Extinction and Inattention (11) 0   Total 18

## 2021-10-13 LAB
ANION GAP SERPL CALC-SCNC: 5 MMOL/L (ref 7–16)
BUN SERPL-MCNC: 46 MG/DL (ref 8–23)
CALCIUM SERPL-MCNC: 9.1 MG/DL (ref 8.3–10.4)
CHLORIDE SERPL-SCNC: 112 MMOL/L (ref 98–107)
CO2 SERPL-SCNC: 28 MMOL/L (ref 21–32)
CREAT SERPL-MCNC: 1.23 MG/DL (ref 0.6–1)
GLUCOSE SERPL-MCNC: 110 MG/DL (ref 65–100)
MAGNESIUM SERPL-MCNC: 2.1 MG/DL (ref 1.8–2.4)
PHOSPHATE SERPL-MCNC: 1.7 MG/DL (ref 2.3–3.7)
POTASSIUM SERPL-SCNC: 3.5 MMOL/L (ref 3.5–5.1)
SODIUM SERPL-SCNC: 145 MMOL/L (ref 136–145)

## 2021-10-13 PROCEDURE — 74011000250 HC RX REV CODE- 250: Performed by: INTERNAL MEDICINE

## 2021-10-13 PROCEDURE — 65270000029 HC RM PRIVATE

## 2021-10-13 PROCEDURE — 83735 ASSAY OF MAGNESIUM: CPT

## 2021-10-13 PROCEDURE — 74011000250 HC RX REV CODE- 250: Performed by: FAMILY MEDICINE

## 2021-10-13 PROCEDURE — 74011250636 HC RX REV CODE- 250/636: Performed by: FAMILY MEDICINE

## 2021-10-13 PROCEDURE — 97535 SELF CARE MNGMENT TRAINING: CPT

## 2021-10-13 PROCEDURE — 74011250637 HC RX REV CODE- 250/637: Performed by: INTERNAL MEDICINE

## 2021-10-13 PROCEDURE — 97530 THERAPEUTIC ACTIVITIES: CPT

## 2021-10-13 PROCEDURE — 74011250636 HC RX REV CODE- 250/636: Performed by: INTERNAL MEDICINE

## 2021-10-13 PROCEDURE — 84100 ASSAY OF PHOSPHORUS: CPT

## 2021-10-13 PROCEDURE — 36415 COLL VENOUS BLD VENIPUNCTURE: CPT

## 2021-10-13 PROCEDURE — 80048 BASIC METABOLIC PNL TOTAL CA: CPT

## 2021-10-13 RX ORDER — HYDRALAZINE HYDROCHLORIDE 20 MG/ML
15 INJECTION INTRAMUSCULAR; INTRAVENOUS ONCE
Status: COMPLETED | OUTPATIENT
Start: 2021-10-13 | End: 2021-10-13

## 2021-10-13 RX ADMIN — HYDRALAZINE HYDROCHLORIDE 15 MG: 20 INJECTION INTRAMUSCULAR; INTRAVENOUS at 10:08

## 2021-10-13 RX ADMIN — AMLODIPINE BESYLATE 10 MG: 10 TABLET ORAL at 09:17

## 2021-10-13 RX ADMIN — Medication 10 ML: at 15:28

## 2021-10-13 RX ADMIN — ACETAMINOPHEN 650 MG: 325 TABLET ORAL at 09:17

## 2021-10-13 RX ADMIN — POTASSIUM PHOSPHATE, MONOBASIC AND POTASSIUM PHOSPHATE, DIBASIC: 224; 236 INJECTION, SOLUTION, CONCENTRATE INTRAVENOUS at 11:21

## 2021-10-13 RX ADMIN — HYDRALAZINE HYDROCHLORIDE 15 MG: 20 INJECTION INTRAMUSCULAR; INTRAVENOUS at 00:48

## 2021-10-13 RX ADMIN — CARVEDILOL 6.25 MG: 6.25 TABLET, FILM COATED ORAL at 09:17

## 2021-10-13 RX ADMIN — Medication 10 ML: at 21:41

## 2021-10-13 RX ADMIN — Medication 10 ML: at 04:22

## 2021-10-13 RX ADMIN — CARVEDILOL 6.25 MG: 6.25 TABLET, FILM COATED ORAL at 16:44

## 2021-10-13 RX ADMIN — METOPROLOL TARTRATE 5 MG: 5 INJECTION INTRAVENOUS at 04:22

## 2021-10-13 RX ADMIN — LEVOTHYROXINE SODIUM 112 MCG: 0.11 TABLET ORAL at 04:22

## 2021-10-13 NOTE — PROGRESS NOTES
Date of Outreach Update:  Aliza Sheikh was seen and assessed. MEWS Score: 1 (10/12/21 1200)  Vitals:    10/12/21 1507 10/12/21 1509 10/12/21 1520 10/12/21 2000   BP: (!) 178/80 (!) 169/75 (!) 186/82 (!) 153/68   Pulse: 75 74 75 96   Resp: 14 14 14 20   Temp:    97.5 °F (36.4 °C)   SpO2: 98% 98% 97% 98%   Weight:       Height:             Pain Assessment  Pain Intensity 1: 0 (10/12/21 1520)  Pain Location 1: Back  Pain Intervention(s) 1: Emotional support, Position  Patient Stated Pain Goal: Unable to verbalize/indicate pain      Previous Outreach assessment has been reviewed. There have been no significant clinical changes since the completion of the last dated Outreach assessment. Pt restless in bed. Opens eyes to command and stated name. Moving all extremities. Following simple commands. PERRLA. Asking for water. Will continue to follow up per outreach protocol.     Signed By:   Elio Contreras RN    October 12, 2021 10:55 PM

## 2021-10-13 NOTE — PROGRESS NOTES
Comprehensive Nutrition Assessment    Type and Reason for Visit: Reassess  Tube Feeding Management (Hospitalist)    Nutrition Recommendations/Plan:   · Continue Enteral Nutrition:   · Enteral Access: Nasogastric  · Formula: Jevity 1.5 Shabbir (Standard with Fiber)  · Delivery: Continuous feeding: Continue @ 20ml/hr, continue progressing by 10ml/10 hours to goal rate of 50ml/hour. · Water flush 135 ml every 4 hours  · Modulars: None   · Enteral regimen at goal will provide 1650 calories (100% estimated calorie needs), 71 grams protein (99% estimated protein needs) and 1646ml free fluid (~1ml/kcal) calculations based on 22 hour infusion. · Nutritional Supplement Therapy:   · Implement electrolyte replacement per nutrition support protocols  · Replacement indicated:  · Phos replaced per protocol this AM  · Labs:   · EN labs: BMP daily, Mg MWF and Phos MWF. · Meals and Snacks:  · NPO, awaiting SLP evaluation  · Bowel Regimen  · Pt may benefit from addition of bowel regimen if pt continues without BM when TF reaches goal infusion rate. Malnutrition Assessment:  Malnutrition Status: At risk for malnutrition (specify) (adv age, NPO, unknown baseline)    Nutrition Assessment:   Nutrition History: Unable to assess. Nutrition Background: Patient with PMH signifncant for HTN, dementia. She presented with headache and increased confusion. CT of head showed right temporal IPH around 2 cm  Daily Update:  Pt seen at bedside, sleeping soundly and does not wake to RD voice. Noted TF infusing at 20ml/hr. Discussed pt with RN. NGT placed per GI 10/12, TF initiated at 10ml/hr following placement. Noted GI unable to place PEG if prolonged nutrition support warranted. Pt awaiting SLP evaluation, however continues to be inappropriate d/t mentation. Abdominal Status (last documented): Semi-soft abdomen with Active  bowel sounds. Last BM 10/06/21.  Pt continues w/o BM, however pt w/o nutrition for duration of admission until 10/12. Will monitor bowel function and assess if bowel regimen necessary. Pertinent Medications: pending 30 mmol K Phos, Keppra continues to be held  Pertinent Labs:  Lab Results   Component Value Date/Time    Sodium 145 10/13/2021 06:28 AM    Potassium 3.5 10/13/2021 06:28 AM    Chloride 112 (H) 10/13/2021 06:28 AM    CO2 28 10/13/2021 06:28 AM    Anion gap 5 (L) 10/13/2021 06:28 AM    Glucose 110 (H) 10/13/2021 06:28 AM    BUN 46 (H) 10/13/2021 06:28 AM    Creatinine 1.23 (H) 10/13/2021 06:28 AM    Calcium 9.1 10/13/2021 06:28 AM    Albumin 3.1 (L) 10/04/2021 05:31 AM    Magnesium 2.1 10/13/2021 06:28 AM    Phosphorus 1.7 (L) 10/13/2021 06:28 AM   Labs reviewed and remarkable for corrected Na, borderline high. Improved K, borderline low. Phos replaced per protocol this AM.     Nutrition Related Findings:   NFPE with no physcial signs of malnutrition. 10/11- Pt pending IR for tube placement, TF to begin pending clearance. 10/13- NGT placed per GI 10/12, Jevity 1.5 began infusing 10/12. Current Nutrition Therapies:  DIET NPO  ADULT TUBE FEEDING Nasogastric  · Feeding Route: Nasogastric  · Formula: Standard with fiber  · Schedule:Continuous    · Regimen: 50ml/hr  · Additives/Modulars:  (None)  · Water Flushes: 135ml Q4H  · Current TF & Flush Orders Provides: @ 20ml/hr  · Goal TF & Flush Orders Provides: Enteral regimen at goal will provide 1650 calories (100% estimated calorie needs), 71 grams protein (99% estimated protein needs) and 1646ml free fluid (~1ml/kcal) calculations based on 22 hour infusion. Current Intake:   Average Meal Intake: NPO Average Supplement Intake: NPO      Anthropometric Measures:  Height: 5' 5\" (165.1 cm)  Current Body Wt: 73.3 kg (161 lb 9.6 oz) (10/11), Weight source: Not specified (likely bed scale)  BMI: 26.9, Overweight (BMI 25.0-29. 9)  Admission Body Weight: 159 lb 6.3 oz (bed scale 10/3)  Ideal Body Weight (lbs) (Calculated): 125 lbs (57 kg), 129.3 %  Usual Body Wt: 72.1 kg (158 lb 15.2 oz) (per EMR review last weighed 4/2 office wt), Percent weight change: -0.4          Edema: No data recorded   Estimated Daily Nutrient Needs:  Energy (kcal/day): 35 20 43 (Kcal/kg (20-25), Weight Used: Current (71.8 kg (10/10)))  Protein (g/day): 72-86 (1-1.2 g/kg) Weight Used: (Current)  Fluid (ml/day):   (1 ml/kcal)    Nutrition Diagnosis:   · Inadequate oral intake related to cognitive or neurological impairment as evidenced by NPO or clear liquid status due to medical condition, nutrition support-enteral nutrition (IC bleed)     Nutrition Interventions:   Food and/or Nutrient Delivery: Continue NPO, Continue tube feeding     Coordination of Nutrition Care: Continue to monitor while inpatient  Plan of Care discussed with Al, RN    Goals:   Previous Goal Met: Progressing toward goal(s)  Active Goal: Tolerate TF @ goal by next RD follow up. Nutrition Monitoring and Evaluation:      Food/Nutrient Intake Outcomes: Enteral nutrition intake/tolerance  Physical Signs/Symptoms Outcomes: Biochemical data, GI status, Weight, Chewing or swallowing    Discharge Planning:     Too soon to determine    Maurice Blanco RD, LD  [phone]    Disaster Mode Active

## 2021-10-13 NOTE — PROGRESS NOTES
Attempted to draw morning labs from PICC, no blood return from any lumens. Notified BODØ from lab that she will need to have her labs drawn this morning.

## 2021-10-13 NOTE — PROGRESS NOTES
Late entry for 's progress note on 10.12.21: Followed up with staff to explore needs. Chaplains remain available for support.      Myah Ortiz 66  Board Certified

## 2021-10-13 NOTE — PROGRESS NOTES
ACUTE PHYSICAL THERAPY GOALS:  (Developed with and agreed upon by patient and/or caregiver.)  1. Patient will perform bed mobility with MODERATE ASSISTANCE within 3 days. MET 10/13/21  2. Patient will transfer sit to stand with MODERATE ASSISTANCE  3. Patient will transfer from bed to chair with MAXIMAL ASSISTANCE within 3 days. 4. Patient will demonstrate GOOD STATIC STANDING balance within 3 day(s). 5. Patient will tolerate 15+ minutes of therapeutic activity/exercise and/or neuromuscular re-education while maintaining stable vitals to improve functional strength and activity tolerance within 3 days. MET 10/13/21     LT. Patient will perform bed mobility with MINIMAL ASSISTANCE within 7 days. 2. Patient will transfer bed to chair with MINIMAL ASSISTANCE within 7 days. 3. Patient will demonstrate GOOD DYNAMIC SITTING balance within 7 day(s). 4. Patient will ambulate 50ft+ using least restrictive assistive device and MINIMAL ASSISTANCE within 7 days. 5. Patient will tolerate 25+ minutes of therapeutic activity/exercise and/or neuromuscular re-education while maintaining stable vitals to improve functional strength and activity tolerance within 7 days.     PHYSICAL THERAPY: Daily Note, AM and PM Treatment Day # 2    Portia Shankar is a 78 y.o. female   PRIMARY DIAGNOSIS: Hemorrhage of right temporal lobe (HCC)  Intracranial bleed ([facility] [I62.9]  Dementia ([address] [F03.90]  Poorly-controlled hypertension [I10]  Hypothyroidism [E03.9]  Encephalopathy, unspecified [G93.40]  Procedure(s) (LRB):  ESOPHAGOGASTRODUODENOSCOPY (EGD) with NG tube placement Room 715 (N/A)  1 Day Post-Op    ASSESSMENT:     REHAB RECOMMENDATIONS: CURRENT LEVEL OF FUNCTION:  (Most Recently Demonstrated)   Recommendation to date pending progress:  Setting:   Short-term Rehab  Equipment:    To Be Determined Bed Mobility:   Moderate Assistance  Sit to Stand:   Not tested  Transfers:   Not tested  Gait/Mobility:   Not tested ASSESSMENT:  Ms. Vickey Ibarra is an 78year old female admitted with AMS and found to have right intraparenchymal hemorrhage and SDH. Patient seen this AM for PT treatment session. Patient presents more alert and conversant today! Oriented to person, place, birthday, and year in a field of two. Able to accurately describe PLOF as ambulatory with a SPC and spouse intermittently assists with ADLs. Moderate assistance x1 EOB and addressed postural retraining, static and dynamic sitting balance activity, postural retraining, NDT (D1/D2) B UEs, and positioning. Patient able to talk with her spouse on the phone. Improved mobility and participation appreciated today. Two goals met today and others remain ongoing and appropriate. Continue to recommend STR at discharge. SUBJECTIVE:   Ms. Vickey Ibarra states, I use a cane at home. SOCIAL HISTORY/ LIVING ENVIRONMENT: Ambulatory with a SPC and spouse assists with ADLs PRN.    Support Systems: Spouse/Significant Other Quinton Sosa [phone] (spouse))  OBJECTIVE:     PAIN: VITAL SIGNS: LINES/DRAINS:   Pre Treatment: Pain Screen  Pain Scale 1: Numeric (0 - 10)  Pain Intensity 1: 0  Post Treatment: FLACC 0   Reynaga Catheter, IV and Nasogastric Tube  O2 Device: Nasal cannula     MOBILITY: I Mod I S SBA CGA Min Mod Max Total  NT x2 Comments:   Bed Mobility    Rolling [] [] [] [] [] [] [x] [] [] [] []    Supine to Sit [] [] [] [] [] [] [x] [] [] [] []    Scooting [] [] [] [] [] [] [x] [] [] [] []    Sit to Supine [] [] [] [] [] [] [x] [] [] [] [x]    Transfers    Sit to Stand [] [] [] [] [] [] [] [] [] [x] []    Bed to Chair [] [] [] [] [] [] [] [] [] [x] []    Stand to Sit [] [] [] [] [] [] [] [] [] [x] []    I=Independent, Mod I=Modified Independent, S=Supervision, SBA=Standby Assistance, CGA=Contact Guard Assistance,   Min=Minimal Assistance, Mod=Moderate Assistance, Max=Maximal Assistance, Total=Total Assistance, NT=Not Tested    BALANCE: Good Fair+ Fair Fair- Poor NT Comments   Sitting Static [] [x] [] [] [] []    Sitting Dynamic [] [] [x] [] [] []              Standing Static [] [] [] [] [] [x]    Standing Dynamic [] [] [] [] [] [x]      GAIT: I Mod I S SBA CGA Min Mod Max Total  NT x2 Comments:   Level of Assistance [] [] [] [] [] [] [] [] [] [x] []    Distance N/A    DME N/A    Gait Quality N/A    Weightbearing  Status N/A     I=Independent, Mod I=Modified Independent, S=Supervision, SBA=Standby Assistance, CGA=Contact Guard Assistance,   Min=Minimal Assistance, Mod=Moderate Assistance, Max=Maximal Assistance, Total=Total Assistance, NT=Not Tested    PLAN:   FREQUENCY/DURATION: PT Plan of Care: 3 times/week for duration of hospital stay or until stated goals are met, whichever comes first.  TREATMENT:     TREATMENT:   ($$ Therapeutic Activity: 38-52 mins    )  Neuromuscular Re-education (40 Minutes): Neuromuscular Re-education included Balance Training, Functional mobility with facilitation, Hemibody awareness training, NDT, Postural training, Sitting balance training and Standing balance training to improve Balance, Coordination, Functional Mobility, Postural Control and Proprioception. At this time, patient is appropriate for Co-treatment with occupational therapy due to patient's decreased overall endurance/tolerance levels, as well as need for high level skilled assistance to complete functional transfers/mobility and functional tasks. Mary Jo Ellis is appropriate for a multidisciplinary co-treatment of PT and OT to address goals of both disciplines.      TREATMENT GRID:  N/A    AFTER TREATMENT POSITION/PRECAUTIONS:  Alarm Activated, Bed, Needs within reach, RN notified and RN updated on patient status/progress    INTERDISCIPLINARY COLLABORATION:  RN/PCT, PT/PTA and OT/ALMEIDA    TOTAL TREATMENT DURATION:  PT Patient Time In/Time Out  Time In: 1128  Time Out: 3 Jennifer Zimmerman DPT

## 2021-10-13 NOTE — PROGRESS NOTES
10/13/21 0716   NIH Stroke Scale   Interval Handoff/Transfer  (Dual NIH w/ Al, RN)   Level of Conciousness (1a) (!) 2   LOC Questions (1b) (!) 2   LOC Commands (1c) (!) 2   Best Gaze (2) 0   Visual (3) 0   Facial Palsy (4) (!) 1   Motor Arm, Left (5a) (!) 1   Motor Arm, Right (5b) (!) 1   Motor Leg, Left (6a) (!) 2   Motor Leg, Right (6b) (!) 2   Limb Ataxia (7) 0   Sensory (8) 0   Best Language (9) (!) 3   Dysarthria (10) (!) 2   Extinction and Inattention (11) 0   Total 18

## 2021-10-13 NOTE — PROGRESS NOTES
Hospitalist Progress Note   Admit Date:  10/3/2021  2:16 AM   Name:  Ish Escalera   Age:  78 y.o. Sex:  female  :  1940   MRN:  [id number]   Room:  [facility]    Presenting Complaint: Headache    Reason(s) for Admission: Intracranial bleed ([address] [I62.9]  Dementia ([address] [F03.90]  Poorly-controlled hypertension [I10]  Hypothyroidism [E03.9]  Encephalopathy, unspecified [G93.40]     Hospital Course & Interval History:     Patient with past medical history of  Hypertension  Dementia  Hypothyroidism    Patient presented to ER on October 3 due to headache and confusion. CT head showed right temporal IPH. 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 Neurosurgery evaluated the patient and did not feel that the bleeding was related to aneurysm. Patient was admitted to ICU. On October 3, code as was called when she developed symptoms related to increased intracranial pressure. She was given mannitol, and Cardene. She developed intermittent delirium and agitation. She started on Depakote and Seroquel. She continues to be encephalopathic. Subjective (10/13/21): 2021  Patient is lying quietly in bed. Not answering question. Not appearing to be in overt distress. No fever. 2021  Patient is lying in bed, no response to verbal stimuli. .   NG tube was successfully inserted. Patient is getting feeding through it. Assessment & Plan:     Principal Problem:    Hemorrhage of right temporal lobe ([address] (10/10/2021)  With several subsequent CT of the brain, showing stable hemorrhage. Avoid antiplatelet, anticoagulants. Continue physical therapy. CTA showed aneurysm but not in the area of her hemorrhage. Not a candidate for surgical intervention at this time. Not much improvement in terms of her encephalopathy. Not eating. Patient is getting feeding through NG tube.    Will discuss with family about long-term plan for feeding. Reportedly patient is not working much with physical therapy. Active Problems:    Hypothyroidism (6/29/2012)  On supplemental levothyroxine      Dementia ([facility] (10/3/2021)        Poorly-controlled hypertension (10/3/2021)  On amlodipine, carvedilol, clonidine patch      LEODAN (acute kidney injury) ([facility] (10/10/2021)  Improved creatinine. Monitor renal function and intake and output. Avoid nephrotoxic agents. Acute metabolic encephalopathy (59/00/6615)  From intracranial hemorrhage. Not much improved. Will see how her condition will involve further. We will need to address long-term care with family members, including feeding choices. Pleural effusion, bilateral (10/11/2021)  Small. Monitor. Chronic diastolic congestive heart failure ([facility] (10/11/2021)  No decompensation. Monitor. PAF (paroxysmal atrial fibrillation) ([facility] (10/11/2021)  Not a candidate for anticoagulation        Dispo/Discharge Planning: To be determined. Likely will need placement. Patient does not have a payer source. Likely will need to go home under the care of family members, or private pay for nursing home. Diet:  DIET NPO  ADULT TUBE FEEDING Nasogastric; Standard with Fiber; Delivery Method: Continuous; Continuous Initial Rate (mL/hr): 10; Continuous Advance Tube Feeding: Yes; Advancement Volume (mL/hr): 10; Advancement Frequency: Other (Specify); Specify Other Adva. .. DVT PPx: SCD   Code status: Full Code     I have discussed the plan of care with patient's daughter on the phone. I updated her about the patient's condition and discuss possibility of long-term tube feeding. She is in agreement to that she will allow PEG tube placement  if needed.        Hospital Problems as of 10/13/2021 Never Reviewed        Codes Class Noted - Resolved POA    Pleural effusion, bilateral ICD-10-CM: J90  ICD-9-CM: 511.9  10/11/2021 - Present Yes Chronic diastolic congestive heart failure (HCC) ICD-10-CM: I50.32  ICD-9-CM: 428.32, 428.0  10/11/2021 - Present Yes        PAF (paroxysmal atrial fibrillation) (HCC) ICD-10-CM: I48.0  ICD-9-CM: 427.31  10/11/2021 - Present Yes        LEODAN (acute kidney injury) ([facility] ICD-10-CM: N17.9  ICD-9-CM: 584.9  10/10/2021 - Present No        Acute metabolic encephalopathy AXZ-78-UG: G93.41  ICD-9-CM: 348.31  10/10/2021 - Present Yes        * (Principal) Hemorrhage of right temporal lobe [facility]) ICD-10-CM: I61.1  ICD-9-CM: 431  10/10/2021 - Present Yes        Dementia ([facility] ICD-10-CM: F03.90  ICD-9-CM: 294.20  10/3/2021 - Present Yes        Poorly-controlled hypertension ICD-10-CM: I10  ICD-9-CM: 401.9  10/3/2021 - Present Yes        Hypothyroidism ICD-10-CM: E03.9  ICD-9-CM: 244.9  6/29/2012 - Present Yes        RESOLVED: Acute pulmonary edema with congestive heart failure (HCC) ICD-10-CM: I50.1  ICD-9-CM: 428.1  10/11/2021 - 10/11/2021 Clinically Undetermined        RESOLVED: Atrial fibrillation with RVR (HCC) ICD-10-CM: I48.91  ICD-9-CM: 427.31  10/10/2021 - 10/11/2021 Clinically Undetermined        RESOLVED: UTI (urinary tract infection) ICD-10-CM: N39.0  ICD-9-CM: 599.0  7/2/2012 - 10/11/2021 Yes              Objective:     Patient Vitals for the past 24 hrs:   Temp Pulse Resp BP SpO2   10/13/21 0800 98.1 °F (36.7 °C) 69 18 (!) 165/81 98 %   10/13/21 0650  71      10/13/21 0422  (!) 146      10/13/21 0400 97.7 °F (36.5 °C) (!) 140 19 (!) 147/63 98 %   10/13/21 0000 97.6 °F (36.4 °C) 83 23 (!) 170/91 96 %   10/12/21 2000 97.5 °F (36.4 °C) 96 20 (!) 153/68 98 %   10/12/21 1520  75 14 (!) 186/82 97 %   10/12/21 1509  74 14 (!) 169/75 98 %   10/12/21 1507  75 14 (!) 178/80 98 %   10/12/21 1459 98 °F (36.7 °C) 72 14 (!) 168/77 97 %   10/12/21 1355 98.7 °F (37.1 °C)       10/12/21 1352  78 22 (!) 159/89 100 %   10/12/21 1200 98.5 °F (36.9 °C) 74 20 (!) 161/88 95 %     Oxygen Therapy  O2 Sat (%): 98 % (10/13/21 0800)  Pulse via Oximetry: 78 beats per minute (10/12/21 1352)  O2 Device: Nasal cannula (10/12/21 1520)  Skin Assessment: Other (see comment/note) (no breakdown; mucosa dry) (10/09/21 1900)  Skin Protection for O2 Device: N/A (10/03/21 2001)  O2 Flow Rate (L/min): 3 l/min (10/12/21 1520)  O2 Temperature: 35.6 °F (2 °C) (10/09/21 0705)    Estimated body mass index is 26.88 kg/m² as calculated from the following:    Height as of this encounter: 5' 5\" (1.651 m). Weight as of this encounter: 73.3 kg (161 lb 8 oz). Intake/Output Summary (Last 24 hours) at 10/13/2021 1053  Last data filed at 10/13/2021 0634  Gross per 24 hour   Intake 1035 ml   Output 855 ml   Net 180 ml         Physical Exam:     Visit Vitals  BP (!) 165/81 (BP 1 Location: Left lower arm, BP Patient Position: Lying)   Pulse 69   Temp 98.1 °F (36.7 °C)   Resp 18   Ht 5' 5\" (1.651 m)   Wt 73.3 kg (161 lb 8 oz)   SpO2 98%   BMI 26.88 kg/m²        Blood pressure (!) 165/81, pulse 69, temperature 98.1 °F (36.7 °C), resp. rate 18, height 5' 5\" (1.651 m), weight 73.3 kg (161 lb 8 oz), SpO2 98 %. General:    Well-nourished. Not answering question. Lying quietly in bed. Not in respiratory distress. Appear chronically ill. NG tube is in place. Not answering to questions. Not responsive to verbal stimuli. Head:  Normocephalic, atraumatic, pale, no jaundice  Eyes:  Sclerae appear normal.  Pupils equally round. ENT:  Nares appear normal, no drainage. Moist oral mucosa  Neck:  No restricted ROM. Trachea midline   CV:   RRR. No m/r/g. No jugular venous distension. Lungs:   CTAB. No wheezing, rhonchi, or rales. Respirations even, unlabored  Abdomen: Bowel sounds present. Soft, nontender, nondistended. Extremities: No cyanosis or clubbing. No edema  Skin:     No rashes and normal coloration. Warm and dry. Neuro:  CN II-XII grossly intact.    Appears to be moving extremities       I have reviewed ordered lab tests and independently visualized imaging below:    Recent Labs:  Recent Results (from the past 48 hour(s))   METABOLIC PANEL, BASIC    Collection Time: 10/12/21  7:11 AM   Result Value Ref Range    Sodium 148 (H) 136 - 145 mmol/L    Potassium 3.2 (L) 3.5 - 5.1 mmol/L    Chloride 114 (H) 98 - 107 mmol/L    CO2 29 21 - 32 mmol/L    Anion gap 5 (L) 7 - 16 mmol/L    Glucose 135 (H) 65 - 100 mg/dL    BUN 57 (H) 8 - 23 MG/DL    Creatinine 1.64 (H) 0.6 - 1.0 MG/DL    GFR est AA 38 (L) >60 ml/min/1.73m2    GFR est non-AA 32 (L) >60 ml/min/1.73m2    Calcium 8.8 8.3 - 10.4 MG/DL   MAGNESIUM    Collection Time: 10/12/21  7:11 AM   Result Value Ref Range    Magnesium 2.0 1.8 - 2.4 mg/dL   PHOSPHORUS    Collection Time: 10/12/21  7:11 AM   Result Value Ref Range    Phosphorus 1.6 (L) 2.3 - 3.7 MG/DL   COVID-19 RAPID TEST    Collection Time: 10/12/21  9:26 AM   Result Value Ref Range    Specimen source NASAL      COVID-19 rapid test Not detected NOTD     METABOLIC PANEL, BASIC    Collection Time: 10/13/21  6:28 AM   Result Value Ref Range    Sodium 145 136 - 145 mmol/L    Potassium 3.5 3.5 - 5.1 mmol/L    Chloride 112 (H) 98 - 107 mmol/L    CO2 28 21 - 32 mmol/L    Anion gap 5 (L) 7 - 16 mmol/L    Glucose 110 (H) 65 - 100 mg/dL    BUN 46 (H) 8 - 23 MG/DL    Creatinine 1.23 (H) 0.6 - 1.0 MG/DL    GFR est AA 54 (L) >60 ml/min/1.73m2    GFR est non-AA 44 (L) >60 ml/min/1.73m2    Calcium 9.1 8.3 - 10.4 MG/DL   MAGNESIUM    Collection Time: 10/13/21  6:28 AM   Result Value Ref Range    Magnesium 2.1 1.8 - 2.4 mg/dL   PHOSPHORUS    Collection Time: 10/13/21  6:28 AM   Result Value Ref Range    Phosphorus 1.7 (L) 2.3 - 3.7 MG/DL       All Micro Results     Procedure Component Value Units Date/Time    COVID-19 RAPID TEST [[id number]] Collected: 10/12/21 0926    Order Status: Completed Specimen: Nasopharyngeal Updated: 10/12/21 1014     Specimen source NASAL        COVID-19 rapid test Not detected        Comment:      The specimen is NEGATIVE for SARS-CoV-2, the novel coronavirus associated with COVID-19. A negative result does not rule out COVID-19. This test has been authorized by the FDA under an Emergency Use Authorization (EUA) for use by authorized laboratories. Fact sheet for Healthcare Providers: ConventionUpdate.co.nz  Fact sheet for Patients: ConventionUpdate.co.nz       Methodology: Isothermal Nucleic Acid Amplification         CULTURE, URINE [[id number]]  (Abnormal)  (Susceptibility) Collected: 10/04/21 2107    Order Status: Completed Specimen: Cath Urine Updated: 10/07/21 0714     Special Requests: NO SPECIAL REQUESTS        Culture result:       >100,000 COLONIES/mL KLEBSIELLA PNEUMONIAE                Other Studies:  No results found.     Current Meds:  Current Facility-Administered Medications   Medication Dose Route Frequency    potassium phosphate 30 mmol in 0.9% sodium chloride 250 mL infusion   IntraVENous ONCE    NUTRITIONAL SUPPORT ELECTROLYTE PRN ORDERS   Does Not Apply PRN    levothyroxine (SYNTHROID) tablet 112 mcg  112 mcg Per G Tube ACB    amLODIPine (NORVASC) tablet 10 mg  10 mg Per G Tube DAILY    carvediloL (COREG) tablet 6.25 mg  6.25 mg Per G Tube BID WITH MEALS    cloNIDine (CATAPRES) 0.3 mg/24 hr patch 1 Patch  1 Patch TransDERmal Q7D    hydrALAZINE (APRESOLINE) 20 mg/mL injection 15 mg  15 mg IntraVENous Q4H PRN    central line flush (saline) syringe 10 mL  10 mL InterCATHeter Q8H    [Held by provider] furosemide (LASIX) injection 40 mg  40 mg IntraVENous BID    [Held by provider] QUEtiapine (SEROquel) tablet 12.5 mg  12.5 mg Oral QHS    acetaminophen (TYLENOL) tablet 650 mg  650 mg Oral Q4H PRN    [Held by provider] levETIRAcetam (KEPPRA) 500 mg in 0.9% sodium chloride (MBP/ADV) 100 mL MBP  500 mg IntraVENous Q12H    [Held by provider] divalproex (DEPAKOTE SPRINKLE) capsule 125 mg  125 mg Oral Q12H    LORazepam (ATIVAN) injection 1 mg  1 mg IntraVENous Q4H PRN    sodium chloride (NS) flush 5-40 mL  5-40 mL IntraVENous PRN    ondansetron (ZOFRAN) injection 8 mg  8 mg IntraVENous Q8H PRN       Signed:  Hanna Smith MD    Part of this note may have been written by using a voice dictation software. The note has been proof read but may still contain some grammatical/other typographical errors.

## 2021-10-13 NOTE — ADT AUTH CERT NOTES
CLinical Review by Eve Sever, RN       Review Status Review Entered   In Primary 10/9/2021 09:17      Criteria Review   Presenting Complaint: Headache     Reason(s) for Admission: Intracranial bleed ([facility] [I62.9]  Dementia ([facility] [F03.90]  Poorly-controlled hypertension [I10]  Hypothyroidism [E03.9]  Encephalopathy, unspecified [G93.40]      VS: 99.5-97-32  172/91 96% 3l/nc     Labs: K+ 3.4 Glu 103 BUN 46 Crea 1.59  ABG   ph 7.37 pCO2 47.8 po2 179 HCO3 27.8  sO2 99.5         .CXR: 1. Right PICC placement. 2. Persisting bilateral lung opacities and small pleural effusions, slightly  Improved.     EKG:Atrial fibrillation with rapid ventricular response   Left axis deviation   Nonspecific ST abnormality   Abnormal ECG      XR abd: A nasogastric tube is again noted seen curled over the lower mid chest.      Contrast was administered and may be contained within a hiatal hernia. Is this  patient known to have a hiatal hernia? There appears to be gastric mucosa. I  would confirm this prior to further use of this feeding tube.       [facility] Course & Interval History:   Mrs. Renay Chatman is an 77 y/o WF with a h/o HTN, dementia, hypothyroidism who presented to ER on 10/3 with c/o headache and confusion. CT head showed R temporal IPH around 2 cm. 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 Evaluated by Neurosurgery evaluated who did not feel her bleed was related to the aneurysm.  Admitted to ICU for BP control and frequent Neuro checks. Code S was called overnight 10/3 for severe headache associated with nausea and dry heaving. She was started on mannitol and Cardene to reduce intracranial pressure. Stat CT head showed no new changes. Has had intermittent delirium and agitation and started on Depakote and Seroquel. CT head 10/5 unchanged, now off hypertonic saline and mannitol.  Meds adjusted due to over sedation on 10/5 (Seroquel dose halved, Depakote held). This improved but she became more agitated on 10/6 so Depakote was unheld. Repeat head CT 10/8 again unchanged.     Subjective (10/09/21):  Remains delirious and in mitts. Unable to get ROS. Spoke to her daughter, Olga Lemus, this morning and updated her. We discussed nutrition since patient not alert enough to take pills or participate in swallow evaluation. Ok for NGT, TFs and change BP meds to per tube. She has a bed on 7 and will transfer later today.      Ok for family to visit once on 7 to see if it will help with her ongoing delirium.           Assessment & Plan:   # Acute metabolic encephalopathy              - Ongoing. Multifactorial from hospitalization/ICU, acute hemorrhage in the setting of underlying dementia. Completed ceftriaxone for cystitis, on 10/7.               - Seroquel, Depakote and Keppra held. Daughter ok with NGT, will place this AM, start TFs and change BP meds to tube.       # HTN              - Off Cardene. NGT to be placed today and will give Norvasc per tube, Coreg also added. IV PRNs available.     # Acute hypoxemic respiratory failure              - 2/2 volume overload. Off IVFs, good uop with IV Lasix. Watch Cr. O2 needs improved.     # AFib RVR              - Resolved. No AC with ICH.    # Acute R temporal lobe IPH              - Appreciate Neurosurgery help, non-surgical management. CTA showed aneurysm but not in area of her hemorrhage. Repeat CT 10/5 and 10/8 were unchanged.      # Acute cystitis              - Pan-sensitive klebsiella, ceftriaxone EOT 10/7.     # CKD              - Baseline.     # Dementia     Dispo/Discharge Planning: Transfer to 7th floor today. D/w daughter, Olga Lemus, today -- [phone].   Diet:  DIET NPO  DVT PPx: SCDs only.   Code status: Full Code      Additional Notes   Meds:  tylenol 650mg po q4h prn,norvasc 10mg every day per ngt,coreg 6.25mg bid per ngt,CVL flush q8h IK,catapres 0.3mg/24hr 1 patch q 7 days,lasix 40mg iv bid,apresoline 15mg iv q4h prn x 3,kepra 500mg iv q12h,ativan 1mg iv q4h prn,zofran 8mg iv q8h prn,toradol 15mg iv x 1,lopressor 5mg iv  x 4,Potassium 20meq IV x 1,0.9%NS @ 50ml/hr,labetalol 10mg iv q4h prn x 2   Orders: NG tube insertion,acosta cath care,strict I&O cardiac monitoring,NPO, Sitter prn ,scds

## 2021-10-13 NOTE — PROGRESS NOTES
ACUTE OT GOALS:  (Developed with and agreed upon by patient and/or caregiver.)  1. Patient will follow 75% of one step verbal or visual commands to increase participation during ADL performance. 2. Patient will tolerate 15 minutes static sitting balance unsupported with CGA while completing functional activity. 3. Patient will tolerate 25  minutes of OT treatment with 2-3 rest breaks to increase activity tolerance for ADLs. 4. Patient will complete functional transfers with CGA and adaptive equipment as needed. 5. Patient will complete upper body bathing and dressing with SBA and adaptive equipment as needed. 6. Patient will complete self-grooming with SBA and adaptive equipment as needed. 7. Patient will tolerate 30 minutes BUE therapeutic exercises to increase functional use during ADL performance.     Timeframe: 7 visits     OCCUPATIONAL THERAPY: Daily Note OT Treatment Day # 3    Shilpa Reyes is a 78 y.o. female   PRIMARY DIAGNOSIS: Hemorrhage of right temporal lobe (HCC)  Intracranial bleed ([facility] [I62.9]  Dementia ([facility] [F03.90]  Poorly-controlled hypertension [I10]  Hypothyroidism [E03.9]  Encephalopathy, unspecified [G93.40]  Procedure(s) (LRB):  ESOPHAGOGASTRODUODENOSCOPY (EGD) with NG tube placement Room 715 (N/A)  1 Day Post-Op  Payor: Caleb Perla / Plan: [facility] / Product Type: [employer] Medicare /   ASSESSMENT:     REHAB RECOMMENDATIONS: CURRENT LEVEL OF FUNCTION:  (Most Recently Demonstrated)   Recommendation to date pending progress:  Setting:   Short-term Rehab  Equipment:    To Be Determined Bathing:   Moderate Assistance  Dressing:   Moderate Assistance  Feeding/Grooming:   Maximal Assistance  Toileting:   Not tested  Functional Mobility:   Moderate Assistance for bed mobility     ASSESSMENT:  Ms. Renay Chatman is doing fair today. Pt presents sitting EOB with PT. Pt more alert today and able to follow commands. Pt assisted with bathing while seating EOB. Required mod A to complete UB/LB bathing. Pt demonstrates fair (-) sitting balance during tasks with kyphotic presence noted. Pt returned to supine and assisted patient with calling her . Good session for patient. Making some progress towards goals this session. Pt would continue to benefit from skilled OT during stay. SUBJECTIVE:   Ms. Ashley Robledo states \"[place]\".     SOCIAL HISTORY/LIVING ENVIRONMENT:   Support Systems: Spouse/Significant Other Morris Sweeney [phone] (spouse))    OBJECTIVE:     PAIN: VITAL SIGNS: LINES/DRAINS:   Pre Treatment: Pain Screen  Pain Scale 1: Numeric (0 - 10)  Pain Intensity 1: 0  Post Treatment: 0   N/A  O2 Device: Nasal cannula     ACTIVITIES OF DAILY LIVING: I Mod I S SBA CGA Min Mod Max Total NT Comments   BASIC ADLs:              Bathing/ Showering [] [] [] [] [] [] [x] [] [] []    Toileting [] [] [] [] [] [] [] [] [] [x]    Dressing [] [] [] [] [] [x] [x] [] [] []    Feeding [] [] [] [] [] [] [] [] [] [x]    Grooming [] [] [] [] [] [] [x] [] [] [] Combing hair   Personal Device Care [] [] [] [] [] [] [] [] [] [x]    Functional Mobility [] [] [] [] [] [] [x] [] [] [] Bed mobility   I=Independent, Mod I=Modified Independent, S=Supervision, SBA=Standby Assistance, CGA=Contact Guard Assistance,   Min=Minimal Assistance, Mod=Moderate Assistance, Max=Maximal Assistance, Total=Total Assistance, NT=Not Tested    MOBILITY: I Mod I S SBA CGA Min Mod Max Total  NT x2 Comments:   Supine to sit [] [] [] [] [] [] [x] [] [] [] []    Sit to supine [] [] [] [] [] [x] [x] [] [] [] []    Sit to stand [] [] [] [] [] [] [] [] [] [x] []    Bed to chair [] [] [] [] [] [] [] [] [] [x] []    I=Independent, Mod I=Modified Independent, S=Supervision, SBA=Standby Assistance, CGA=Contact Guard Assistance,   Min=Minimal Assistance, Mod=Moderate Assistance, Max=Maximal Assistance, Total=Total Assistance, NT=Not Tested    BALANCE: Good Fair+ Fair Fair- Poor NT Comments   Sitting Static [] [] [] [x] [] []    Sitting Dynamic [] [] [] [x] [x] []              Standing Static [] [] [] [] [] [x]    Standing Dynamic [] [] [] [] [] [x]      PLAN:   FREQUENCY/DURATION: OT Plan of Care: 3 times/week for duration of hospital stay or until stated goals are met, whichever comes first.    TREATMENT:   TREATMENT:   ($$ Self Care/Home Management: 8-22 mins$$ Therapeutic Activity: 8-22 mins   )  Co-Treatment PT/OT necessary due to patient's decreased overall endurance/tolerance levels, as well as need for high level skilled assistance to complete functional transfers/mobility and functional tasks  Therapeutic Activity (10 Minutes): Therapeutic activity included Sit to Supine and Sitting balance  to improve functional Mobility, Strength and Activity tolerance. Self Care (15 Minutes): Self care including Upper Body Bathing, Lower Body Bathing, Upper Body Dressing, Lower Body Dressing and Grooming to increase independence and decrease level of assistance required.     TREATMENT GRID:  N/A    AFTER TREATMENT POSITION/PRECAUTIONS:  Chair, Needs within reach and mitts     INTERDISCIPLINARY COLLABORATION:  RN/PCT, PT/PTA and OT/ALMEIDA    TOTAL TREATMENT DURATION:  OT Patient Time In/Time Out  Time In: 1145  Time Out: 1210    [place]

## 2021-10-13 NOTE — PROGRESS NOTES
Problem: Hemorrhagic Stroke: Admission Day (0-3 hours)  Goal: Off Pathway (Use only if patient is Off Pathway)  Outcome: Progressing Towards Goal

## 2021-10-14 ENCOUNTER — APPOINTMENT (OUTPATIENT)
Dept: GENERAL RADIOLOGY | Age: 81
DRG: 064 | End: 2021-10-14
Attending: INTERNAL MEDICINE
Payer: MEDICARE

## 2021-10-14 LAB
ANION GAP SERPL CALC-SCNC: 4 MMOL/L (ref 7–16)
BUN SERPL-MCNC: 42 MG/DL (ref 8–23)
CALCIUM SERPL-MCNC: 8.8 MG/DL (ref 8.3–10.4)
CHLORIDE SERPL-SCNC: 112 MMOL/L (ref 98–107)
CO2 SERPL-SCNC: 31 MMOL/L (ref 21–32)
CREAT SERPL-MCNC: 1.2 MG/DL (ref 0.6–1)
GLUCOSE SERPL-MCNC: 114 MG/DL (ref 65–100)
POTASSIUM SERPL-SCNC: 3.7 MMOL/L (ref 3.5–5.1)
SODIUM SERPL-SCNC: 147 MMOL/L (ref 136–145)

## 2021-10-14 PROCEDURE — 74018 RADEX ABDOMEN 1 VIEW: CPT

## 2021-10-14 PROCEDURE — 65270000029 HC RM PRIVATE

## 2021-10-14 PROCEDURE — 92526 ORAL FUNCTION THERAPY: CPT

## 2021-10-14 PROCEDURE — 80048 BASIC METABOLIC PNL TOTAL CA: CPT

## 2021-10-14 PROCEDURE — 74011250636 HC RX REV CODE- 250/636: Performed by: INTERNAL MEDICINE

## 2021-10-14 PROCEDURE — 97112 NEUROMUSCULAR REEDUCATION: CPT

## 2021-10-14 PROCEDURE — 97530 THERAPEUTIC ACTIVITIES: CPT

## 2021-10-14 PROCEDURE — 77030018798 HC PMP KT ENTRL FED COVD -A

## 2021-10-14 PROCEDURE — 74011250637 HC RX REV CODE- 250/637: Performed by: INTERNAL MEDICINE

## 2021-10-14 RX ADMIN — AMLODIPINE BESYLATE 10 MG: 10 TABLET ORAL at 09:10

## 2021-10-14 RX ADMIN — Medication 10 ML: at 22:29

## 2021-10-14 RX ADMIN — Medication 10 ML: at 14:11

## 2021-10-14 RX ADMIN — LEVOTHYROXINE SODIUM 112 MCG: 0.11 TABLET ORAL at 04:28

## 2021-10-14 RX ADMIN — CARVEDILOL 6.25 MG: 6.25 TABLET, FILM COATED ORAL at 09:10

## 2021-10-14 RX ADMIN — HYDRALAZINE HYDROCHLORIDE 15 MG: 20 INJECTION INTRAMUSCULAR; INTRAVENOUS at 04:29

## 2021-10-14 RX ADMIN — Medication 10 ML: at 04:32

## 2021-10-14 NOTE — PROGRESS NOTES
ACUTE OT GOALS:  (Developed with and agreed upon by patient and/or caregiver.)  1. Patient will follow 75% of one step verbal or visual commands to increase participation during ADL performance. 2. Patient will tolerate 15 minutes static sitting balance unsupported with CGA while completing functional activity. 3. Patient will tolerate 25  minutes of OT treatment with 2-3 rest breaks to increase activity tolerance for ADLs. 4. Patient will complete functional transfers with CGA and adaptive equipment as needed. 5. Patient will complete upper body bathing and dressing with SBA and adaptive equipment as needed. 6. Patient will complete self-grooming with SBA and adaptive equipment as needed. 7. Patient will tolerate 30 minutes BUE therapeutic exercises to increase functional use during ADL performance.     Timeframe: 7 visits     OCCUPATIONAL THERAPY: Daily Note OT Treatment Day # 4    [place] is a 78 y.o. female   PRIMARY DIAGNOSIS: Hemorrhage of right temporal lobe (HCC)  Intracranial bleed ([facility] [address] [I62.9]  Dementia ([facility] [address] [F03.90]  Poorly-controlled hypertension [I10]  Hypothyroidism [E03.9]  Encephalopathy, unspecified [G93.40]  Procedure(s) (LRB):  ESOPHAGOGASTRODUODENOSCOPY (EGD) with NG tube placement Room 715 (N/A)  2 Days Post-Op  Payor: [employer] / Plan: Raimundo Coello / Product Type: Managed Care Medicare /   ASSESSMENT:     REHAB RECOMMENDATIONS: CURRENT LEVEL OF FUNCTION:  (Most Recently Demonstrated)   Recommendation to date pending progress:  Setting:   Short-term Rehab  Equipment:    To Be Determined Bathing:   Not tested  Dressing:   Not tested  Feeding/Grooming:   Not tested  Toileting:   Not tested  Functional Mobility:   Moderate Assistance x 2     ASSESSMENT:  Ms. Vickey Ibarra is doing fair today. Pt presents supine in bed. Pt required mod A x2 for bed mobility, sit <> stand transfers to recliner, and functional mobility.  Pt c/o of nausea throughout session and RN was notified. Pt left in recliner with all needs within reach. Assisted patient with calling her . Good session for patient. Making some progress towards goals this session. Pt would continue to benefit from skilled OT during stay. SUBJECTIVE:   Ms. Prasad Allen states \"My cat misses me\".     SOCIAL HISTORY/LIVING ENVIRONMENT:   Support Systems: Spouse/Significant Other Keo Holcomb [phone] (spouse))    OBJECTIVE:     PAIN: VITAL SIGNS: LINES/DRAINS:   Pre Treatment:    Post Treatment: 0   Reynaga Catheter, IV and Nasogastric Tube  O2 Device: Nasal cannula     ACTIVITIES OF DAILY LIVING: I Mod I S SBA CGA Min Mod Max Total NT Comments   BASIC ADLs:              Bathing/ Showering [] [] [] [] [] [] [] [] [] [x]    Toileting [] [] [] [] [] [] [] [] [] [x]    Dressing [] [] [] [] [] [] [] [] [] [x]    Feeding [] [] [] [] [] [] [] [] [] [x]    Grooming [] [] [] [] [] [] [] [] [] [x]    Personal Device Care [] [] [] [] [] [] [] [] [] [x]    Functional Mobility [] [] [] [] [] [] [x] [] [] [] x2   I=Independent, Mod I=Modified Independent, S=Supervision, SBA=Standby Assistance, CGA=Contact Guard Assistance,   Min=Minimal Assistance, Mod=Moderate Assistance, Max=Maximal Assistance, Total=Total Assistance, NT=Not Tested    MOBILITY: I Mod I S SBA CGA Min Mod Max Total  NT x2 Comments:   Supine to sit [] [] [] [] [] [] [x] [] [] [] []    Sit to supine [] [] [] [] [] [] [] [] [] [x] []    Sit to stand [] [] [] [] [] [] [x] [] [] [] [x]    Bed to chair [] [] [] [] [] [] [x] [] [] [] [x]    I=Independent, Mod I=Modified Independent, S=Supervision, SBA=Standby Assistance, CGA=Contact Guard Assistance,   Min=Minimal Assistance, Mod=Moderate Assistance, Max=Maximal Assistance, Total=Total Assistance, NT=Not Tested    BALANCE: Good Fair+ Fair Fair- Poor NT Comments   Sitting Static [] [] [] [x] [] []    Sitting Dynamic [] [] [] [x] [x] []              Standing Static [] [] [] [] [x] []    Standing Dynamic [] [] [] [] [x] []      PLAN:   FREQUENCY/DURATION: OT Plan of Care: 3 times/week for duration of hospital stay or until stated goals are met, whichever comes first.    TREATMENT:   TREATMENT:   (     )  Co-Treatment PT/OT necessary due to patient's decreased overall endurance/tolerance levels, as well as need for high level skilled assistance to complete functional transfers/mobility and functional tasks  Therapeutic Activity (25 Minutes): Therapeutic activity included Supine to Sit, Scooting, Ambulation on level ground, Sitting balance  and Standing balance to improve functional Mobility, Strength and Activity tolerance.     TREATMENT GRID:  N/A    AFTER TREATMENT POSITION/PRECAUTIONS:  Alarm Activated, Bed and Needs within reach    INTERDISCIPLINARY COLLABORATION:  RN/PCT, PT/PTA and OT/ALMEIDA    TOTAL TREATMENT DURATION:  OT Patient Time In/Time Out  Time In: 1125  Time Out: 515 [facility]

## 2021-10-14 NOTE — PROGRESS NOTES
Problem: Patient Education: Go to Patient Education Activity  Goal: Patient/Family Education  Outcome: Progressing Towards Goal     Problem: Hemorrhagic Stroke: Day 5 through Discharge  Goal: Activity/Safety  Outcome: Progressing Towards Goal  Goal: Consults, if ordered  Outcome: Progressing Towards Goal  Goal: Diagnostic Test/Procedures  Outcome: Progressing Towards Goal  Goal: Nutrition/Diet  Outcome: Progressing Towards Goal  Goal: Medications  Outcome: Progressing Towards Goal  Goal: Respiratory  Outcome: Progressing Towards Goal  Goal: Treatments/Interventions/Procedures  Outcome: Progressing Towards Goal  Goal: Psychosocial  Outcome: Progressing Towards Goal  Goal: *Hemodynamically stable  Outcome: Progressing Towards Goal  Goal: *Verbalizes anxiety and depression are reduced or absent  Outcome: Progressing Towards Goal  Goal: *Absence of aspiration  Outcome: Progressing Towards Goal  Goal: *Absence of signs and symptoms of DVT  Outcome: Progressing Towards Goal  Goal: *Optimal pain control at patient's stated goal  Outcome: Progressing Towards Goal  Goal: *Tolerating diet  Outcome: Progressing Towards Goal  Goal: *Progressive mobility and function  Outcome: Not Progressing Towards Goal  Goal: *Rehabilitation readiness  Outcome: Not Progressing Towards Goal     Problem: Falls - Risk of  Goal: *Absence of Falls  Description: Document Claire Fall Risk and appropriate interventions in the flowsheet.   Outcome: Progressing Towards Goal  Note: Fall Risk Interventions:  Mobility Interventions: Bed/chair exit alarm    Mentation Interventions: Bed/chair exit alarm    Medication Interventions: Bed/chair exit alarm    Elimination Interventions: Bed/chair exit alarm    History of Falls Interventions: Bed/chair exit alarm         Problem: Patient Education: Go to Patient Education Activity  Goal: Patient/Family Education  Outcome: Progressing Towards Goal     Problem: Patient Education: Go to Patient Education Activity  Goal: Patient/Family Education  Outcome: Progressing Towards Goal     Problem: Pressure Injury - Risk of  Goal: *Prevention of pressure injury  Description: Document Romeo Scale and appropriate interventions in the flowsheet.   Outcome: Progressing Towards Goal  Note: Pressure Injury Interventions:  Sensory Interventions: Assess changes in LOC    Moisture Interventions: Absorbent underpads, Check for incontinence Q2 hours and as needed, Internal/External urinary devices    Activity Interventions: Pressure redistribution bed/mattress(bed type)    Mobility Interventions: Pressure redistribution bed/mattress (bed type)    Nutrition Interventions: Document food/fluid/supplement intake    Friction and Shear Interventions: Foam dressings/transparent film/skin sealants                Problem: Patient Education: Go to Patient Education Activity  Goal: Patient/Family Education  Outcome: Progressing Towards Goal     Problem: Patient Education: Go to Patient Education Activity  Goal: Patient/Family Education  Outcome: Progressing Towards Goal     Problem: Delirium Treatment  Goal: *Level of consciousness restored to baseline  Outcome: Progressing Towards Goal  Goal: *Level of environmental perceptions restored to baseline  Outcome: Progressing Towards Goal  Goal: *Sensory perception restored to baseline  Outcome: Progressing Towards Goal  Goal: *Emotional stability restored to baseline  Outcome: Progressing Towards Goal  Goal: *Functional assessment restored to baseline  Outcome: Progressing Towards Goal  Goal: *Absence of falls  Outcome: Progressing Towards Goal  Goal: *Will remain free of delirium, CAM Score negative  Outcome: Progressing Towards Goal  Goal: *Cognitive status will be restored to baseline  Outcome: Progressing Towards Goal  Goal: Interventions  Outcome: Progressing Towards Goal     Problem: Patient Education: Go to Patient Education Activity  Goal: Patient/Family Education  Outcome: Progressing Towards Goal

## 2021-10-14 NOTE — PROGRESS NOTES
LTG: Patient will tolerate least restrictive diet without overt signs or symptoms of airway compromise. STG: Patient will participate modified barium swallow study to objectively assess swallow function    SPEECH LANGUAGE PATHOLOGY: DYSPHAGIA- Re-evaluation    NAME/AGE/GENDER: Zay Mcdowell is a 78 y.o. female  DATE: 10/14/2021  PRIMARY DIAGNOSIS: Intracranial bleed ([facility] [address] [I62.9]  Dementia ([facility] [address] [F03.90]  Poorly-controlled hypertension [I10]  Hypothyroidism [E03.9]  Encephalopathy, unspecified [G93.40]  Procedure(s) (LRB):  ESOPHAGOGASTRODUODENOSCOPY (EGD) with NG tube placement Room 715 (N/A) 2 Days Post-Op  ICD-10: Treatment Diagnosis: R13.11 Dysphagia, Oral Phase    RECOMMENDATIONS   DIET:    NPO with alternative Nutrition/Hydration/Medication    MEDICATIONS: non-oral; crushed in puree if necessary     PRECAUTIONS, MODIFICATIONS, AND STRATEGIES  · Oral care every 3 hours  · Upright positioning during tube feedings  · Ice chips for pleasure with nursing staff  ·      RECOMMENDATIONS FOR CONTINUED SPEECH THERAPY:   YES: Anticipate need for ongoing speech therapy during this hospitalization. ASSESSMENT   Re-evaluation completed given improved alertness and responsiveness. Patient communicates in only a whisper with mildly wet vocal quality that intermittently occurs with po intake. Voice improves with cued throat clear, but again becomes mildly wet with additional po trials. No overt coughing or throat clearing with thin liquids or puree; however, multiple swallows per bolus. Unable to determine if multiple swallows are related to poor pharyngeal clearance vs sensation of NG tube. Recommend continue NPO with non-oral nutrition/hyatration/medication. OK for ice chips with nursing staff. Plan for modified barium swallow study on 10/15 to objectively assess swallow function.      EDUCATION:  · Recommendations discussed with Patient and RN; MD via perfect serve    REHABILITATION POTENTIAL FOR STATED GOALS: Good    PLAN    FREQUENCY/DURATION:   Continue to follow patient 2 times a week for duration of hospital stay to address above goals. Recommendations for next treatment session: Next treatment session will address diet tolerance, po trials and assessment of cognitive-linguistic abilities     CONTINUATION OF SKILLED SERVICES/MEDICAL NECESSITY:   Patient is expected to demonstrate progress in  swallow strength, swallow timeliness, swallow function, diet tolerance and swallow safety in order to  improve swallow safety, work toward diet advancement and decrease aspiration risk.  Patient continues to require skilled intervention due to  oral dysphagia, confusion, and agitation. SUBJECTIVE   Patient upright in chair; communicating at a whisper. NG tube placed in IR yesterday, but has now been pulled by patient. Oxygen Device: nasal cannula  Pain: Pain Scale 1: Numeric (0 - 10)  Pain Intensity 1: 0    History of Present Injury/Illness: Ms. Ebonie Abdul  has a past medical history of Endocrine disease, Hypertension, and Other ill-defined conditions(129.89). . She also  has no past surgical history on file. PRECAUTIONS/ALLERGIES: Patient has no known allergies. Problem List:  (Impairments causing functional limitations):  1. Oral dysphagia due to missing dentition    Previous Dysphagia: NONE REPORTED  Diet Prior to Evaluation: NPO pending SLP evaluation    Orientation:  Person  Place  Disoriented to situation    OBJECTIVE   Oral Motor:   · Labial: No impairment  · Dentition: Edentulous  · Oral Hygiene: Dry  · Lingual: No impairment    Dysphagia evaluation:   Patient presented with ice chips, thin by tsp and straw, and puree. Cup sips deferred due to positioning, O2, and presents of NG tube. She was only able to voice in a whisper despite cues to increase volume. Ice chips and thin liquids presented without overt s/sx of airway compromise. However, 2-4 swallows per bolus and voice becoming mildly wet.  Cued throat clear briefly improved vocal quality. It again became wet after additional thin liquid trials. Appropriate oral clearing with purees. 3-5 swallows per bolus. It is difficult to discern poor pharyngeal clearance vs sensation of NG tube. Given concerns for airway compromise, will continue NPO until instrumental assessment can be completed. Tool Used: Dysphagia Outcome and Severity Scale (KEVIN)    Score Comments   Normal Diet  [] 7 With no strategies or extra time needed   Functional Swallow  [] 6 May have mild oral or pharyngeal delay   Mild Dysphagia  [] 5 Which may require one diet consistency restricted    Mild-Moderate Dysphagia  [] 4 With 1-2 diet consistencies restricted   Moderate Dysphagia  [] 3 With 2 or more diet consistencies restricted   Moderate-Severe Dysphagia  [] 2 With partial PO strategies (trials with ST only)   Severe Dysphagia  [] 1 With inability to tolerate any PO safely      Score:  Initial: 4 Most Recent: 2 (Date 10/14/21 )   Interpretation of Tool: The Dysphagia Outcome and Severity Scale (KEVIN) is a simple, easy-to-use, 7-point scale developed to systematically rate the functional severity of dysphagia based on objective assessment and make recommendations for diet level, independence level, and type of nutrition. Current Medications:   No current facility-administered medications on file prior to encounter. Current Outpatient Medications on File Prior to Encounter   Medication Sig Dispense Refill    calcium-vitamin D (OYSTER SHELL) 500 mg(1,250mg) -200 unit per tablet Take 1 Tab by mouth three (3) times daily (with meals). 90 Tab 0    traMADol (ULTRAM) 50 mg tablet Take 1 Tab by mouth every six (6) hours as needed for Pain. 60 Tab 0    amLODIPine (NORVASC) 5 mg tablet Take 5 mg by mouth daily.  levothyroxine (SYNTHROID) 112 mcg tablet Take 112 mcg by mouth Daily (before breakfast).             INTERDISCIPLINARY COLLABORATION: RN and MD    After treatment position/precautions:  · Upright in chair  · Call light within reach  · RN and MD notified    Total Treatment Duration:   Time In: 6881  Time Out: [address], [address] CCC-SLP  Speech Language Pathologist  Acute Rehabilitation Services  Contact: Leeann

## 2021-10-14 NOTE — PROGRESS NOTES
10/14/21 1907   NIH Stroke Scale   Interval Handoff/Transfer   Level of Conciousness (1a) (!) 1   LOC Questions (1b) (!) 1   LOC Commands (1c) (!) 2   Best Gaze (2) 0   Visual (3) 0   Facial Palsy (4) (!) 1   Motor Arm, Left (5a) (!) 1   Motor Arm, Right (5b) (!) 1   Motor Leg, Left (6a) (!) 2   Motor Leg, Right (6b) (!) 2   Limb Ataxia (7) 0   Sensory (8) 0   Best Language (9) (!) 1   Dysarthria (10) (!) 1   Extinction and Inattention (11) 0   Total 13

## 2021-10-14 NOTE — PROGRESS NOTES
10/13/21 1900   NIH Stroke Scale   Interval Handoff/Transfer   Level of Conciousness (1a) (!) 1   LOC Questions (1b) (!) 1   LOC Commands (1c) (!) 2   Best Gaze (2) 0   Visual (3) 0   Facial Palsy (4) (!) 1   Motor Arm, Left (5a) (!) 1   Motor Arm, Right (5b) (!) 1   Motor Leg, Left (6a) (!) 2   Motor Leg, Right (6b) (!) 2   Limb Ataxia (7) 0   Sensory (8) 0   Best Language (9) (!) 1   Dysarthria (10) (!) 2   Extinction and Inattention (11) 0   Total 14

## 2021-10-14 NOTE — PROGRESS NOTES
ACUTE PHYSICAL THERAPY GOALS:  (Developed with and agreed upon by patient and/or caregiver.)  1. Patient will perform bed mobility with MODERATE ASSISTANCE within 3 days. MET 10/13/21  2. Patient will transfer sit to stand with MODERATE ASSISTANCE  3. Patient will transfer from bed to chair with MAXIMAL ASSISTANCE within 3 days. 4. Patient will demonstrate GOOD STATIC STANDING balance within 3 day(s). 5. Patient will tolerate 15+ minutes of therapeutic activity/exercise and/or neuromuscular re-education while maintaining stable vitals to improve functional strength and activity tolerance within 3 days. MET 10/13/21     LT. Patient will perform bed mobility with MINIMAL ASSISTANCE within 7 days. 2. Patient will transfer bed to chair with MINIMAL ASSISTANCE within 7 days. 3. Patient will demonstrate GOOD DYNAMIC SITTING balance within 7 day(s). 4. Patient will ambulate 50ft+ using least restrictive assistive device and MINIMAL ASSISTANCE within 7 days. 5. Patient will tolerate 25+ minutes of therapeutic activity/exercise and/or neuromuscular re-education while maintaining stable vitals to improve functional strength and activity tolerance within 7 days.     PHYSICAL THERAPY: Daily Note and AM Treatment Day # 3    Sharath Gambino is a 78 y.o. female   PRIMARY DIAGNOSIS: Hemorrhage of right temporal lobe (HCC)  Intracranial bleed ([facility] [address] [I62.9]  Dementia ([facility] [address] [F03.90]  Poorly-controlled hypertension [I10]  Hypothyroidism [E03.9]  Encephalopathy, unspecified [G93.40]  Procedure(s) (LRB):  ESOPHAGOGASTRODUODENOSCOPY (EGD) with NG tube placement Room 715 (N/A)  2 Days Post-Op    ASSESSMENT:     REHAB RECOMMENDATIONS: CURRENT LEVEL OF FUNCTION:  (Most Recently Demonstrated)   Recommendation to date pending progress:  Setting:   Short-term Rehab  Equipment:    To Be Determined Bed Mobility:   Moderate Assistance  Sit to Stand:   Moderate Assistance x 2  Transfers:   Moderate Assistance x 2  Gait/Mobility:   Not tested     ASSESSMENT:  Ms. Siomara Morales is an 78year old female admitted with AMS and found to have right intraparenchymal hemorrhage and SDH. Patient seen this AM for PT treatment session. Patient presents alert, endorses nausea, and motivated to participate. Today, required moderate assistance x1-2 for bed mobility, STS transfers, and transfers bed to chair. Addressed functional mobility with facilitation, sitting and standing balance, and pre-gait weight shifts. Nausea persists s/p mobility; primary RN aware. Good progress today transferring out of bed to chair. Continue to recommend STR at discharge. SUBJECTIVE:   Ms. Siomara Morales states, I want to go home. SOCIAL HISTORY/ LIVING ENVIRONMENT: Ambulatory with a SPC and spouse assists with ADLs PRN.    Support Systems: Spouse/Significant Other Zoe Lorenzo [phone] (spouse))  OBJECTIVE:     PAIN: VITAL SIGNS: LINES/DRAINS:   Pre Treatment: Pain Screen  Pain Scale 1: Numeric (0 - 10)  Pain Intensity 1: 0  Post Treatment: FLACC 0   Reynaga Catheter, IV and Nasogastric Tube  O2 Device: Nasal cannula     MOBILITY: I Mod I S SBA CGA Min Mod Max Total  NT x2 Comments:   Bed Mobility    Rolling [] [] [] [] [] [] [] [] [] [x] []    Supine to Sit [] [] [] [] [] [] [x] [] [] [] []    Scooting [] [] [] [] [] [] [x] [] [] [] [x]    Sit to Supine [] [] [] [] [] [] [] [] [] [x] []    Transfers    Sit to Stand [] [] [] [] [] [] [x] [x] [] [x] [x]    Bed to Chair [] [] [] [] [] [] [x] [x] [] [] [x]    Stand to Sit [] [] [] [] [] [] [x] [x] [] [] [x]    I=Independent, Mod I=Modified Independent, S=Supervision, SBA=Standby Assistance, CGA=Contact Guard Assistance,   Min=Minimal Assistance, Mod=Moderate Assistance, Max=Maximal Assistance, Total=Total Assistance, NT=Not Tested    BALANCE: Good Fair+ Fair Fair- Poor NT Comments   Sitting Static [] [] [x] [] [] []    Sitting Dynamic [] [] [x] [] [] []              Standing Static [] [] [] [] [x] []    Standing Dynamic [] [] [] [] [x] []      GAIT: I Mod I S SBA CGA Min Mod Max Total  NT x2 Comments:   Level of Assistance [] [] [] [] [] [] [] [] [] [x] [] Initiated stepping to chair to assist with transfer   Distance N/A    DME N/A    Gait Quality N/A    Weightbearing  Status N/A     I=Independent, Mod I=Modified Independent, S=Supervision, SBA=Standby Assistance, CGA=Contact Guard Assistance,   Min=Minimal Assistance, Mod=Moderate Assistance, Max=Maximal Assistance, Total=Total Assistance, NT=Not Tested    PLAN:   FREQUENCY/DURATION: PT Plan of Care: 3 times/week for duration of hospital stay or until stated goals are met, whichever comes first.  TREATMENT:     TREATMENT:   ($$ Neuromuscular Re-education: 23-37 mins    )  Neuromuscular Re-education (25 Minutes): Neuromuscular Re-education included Balance Training, Functional mobility with facilitation, Hemibody awareness training, Postural training, Sitting balance training and Standing balance training to improve Balance, Coordination, Functional Mobility, Postural Control and Proprioception. At this time, patient is appropriate for Co-treatment with occupational therapy due to patient's decreased overall endurance/tolerance levels, as well as need for high level skilled assistance to complete functional transfers/mobility and functional tasks. Nils Chamberlain is appropriate for a multidisciplinary co-treatment of PT and OT to address goals of both disciplines.       TREATMENT GRID:  N/A    AFTER TREATMENT POSITION/PRECAUTIONS:  Alarm Activated, Chair, Needs within reach, RN notified and RN updated on patient status/progress    INTERDISCIPLINARY COLLABORATION:  RN/PCT, PT/PTA and OT/ALMEIDA    TOTAL TREATMENT DURATION:  PT Patient Time In/Time Out  Time In: 1125  Time Out: [address], DPT

## 2021-10-14 NOTE — PROGRESS NOTES
Hospitalist Progress Note   Admit Date:  10/3/2021  2:16 AM   Name:  Lakeisha Osborne   Age:  78 y.o. Sex:  female  :  1940   MRN:  [id number]   Room:  [facility]    Presenting Complaint: Headache    Reason(s) for Admission: Intracranial bleed ([address] [I62.9]  Dementia ([address] [F03.90]  Poorly-controlled hypertension [I10]  Hypothyroidism [E03.9]  Encephalopathy, unspecified [G93.40]     Hospital Course & Interval History:     Patient with past medical history of  Hypertension  Dementia  Hypothyroidism    Patient presented to ER on October 3 due to headache and confusion. CT head showed right temporal IPH. 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 Neurosurgery evaluated the patient and did not feel that the bleeding was related to aneurysm. Patient was admitted to ICU. On October 3, code as was called when she developed symptoms related to increased intracranial pressure. She was given mannitol, and Cardene. She developed intermittent delirium and agitation. She started on Depakote and Seroquel. She continues to be encephalopathic. Subjective (10/14/21): 2021  Patient is lying quietly in bed. Not answering question. Not appearing to be in overt distress. No fever. 2021  Patient is lying in bed, no response to verbal stimuli. .   NG tube was successfully inserted. Patient is getting feeding through it. 2021  Reportedly patient seems more alert, more responsive verbally. She is more able to participate in physical therapy yesterday. At one point speech therapist signed her off because she was not participating in sessions enough. I have discussed with her daughter yesterday on the phone. She indicated to me that if the patient required PEG tube placement, she would allow that.      Assessment & Plan:     Principal Problem:    Hemorrhage of right temporal lobe ([address] (10/10/2021)  With several subsequent CT of the brain, showing stable hemorrhage. Avoid antiplatelet, anticoagulants. Continue physical therapy. We will ask speech therapist to come back and assess and work with her again. CTA showed aneurysm but not in the area of her hemorrhage. Not a candidate for surgical intervention at this time. Improve encephalopathy. Patient is more alert, more conversant. Continue to be fed through NG tube. With her more alertness, hopefully the patient will start eating by herself. Continue physical therapy. Active Problems:    Hypothyroidism (6/29/2012)  On supplemental levothyroxine      Dementia ([address] (10/3/2021)        Poorly-controlled hypertension (10/3/2021)  On amlodipine, carvedilol, clonidine patch      LEODAN (acute kidney injury) ([facility] (10/10/2021)  Improved creatinine. Monitor renal function and intake and output. Avoid nephrotoxic agents. Acute metabolic encephalopathy (25/35/6769)  From intracranial hemorrhage. Not much improved. Will see how her condition will involve further. We will need to address long-term care with family members, including feeding choices. Pleural effusion, bilateral (10/11/2021)  Small. Monitor. Chronic diastolic congestive heart failure ([facility] (10/11/2021)  No decompensation. Monitor. PAF (paroxysmal atrial fibrillation) ([facility] (10/11/2021)  Not a candidate for anticoagulation        Dispo/Discharge Planning: To be determined. Likely will need placement. I discussed with  who will continue to assess her progress, and situation regarding placement options. Diet:  DIET NPO  ADULT TUBE FEEDING Nasogastric; Standard with Fiber; Delivery Method: Continuous; Continuous Initial Rate (mL/hr): 10; Continuous Advance Tube Feeding: Yes; Advancement Volume (mL/hr): 10; Advancement Frequency: Other (Specify); Specify Other Adva. ..   DVT PPx: SCD   Code status: Full Code Hospital Problems as of 10/14/2021 Never Reviewed        Codes Class Noted - Resolved POA    Pleural effusion, bilateral ICD-10-CM: J90  ICD-9-CM: 511.9  10/11/2021 - Present Yes        Chronic diastolic congestive heart failure (HCC) ICD-10-CM: I50.32  ICD-9-CM: 428.32, 428.0  10/11/2021 - Present Yes        PAF (paroxysmal atrial fibrillation) (HCC) ICD-10-CM: I48.0  ICD-9-CM: 427.31  10/11/2021 - Present Yes        LEODAN (acute kidney injury) ([facility] ICD-10-CM: N17.9  ICD-9-CM: 584.9  10/10/2021 - Present No        Acute metabolic encephalopathy PLZ-01-GO: G93.41  ICD-9-CM: 348.31  10/10/2021 - Present Yes        * (Principal) Hemorrhage of right temporal lobe [facility]) ICD-10-CM: I61.1  ICD-9-CM: 431  10/10/2021 - Present Yes        Dementia ([facility] ICD-10-CM: F03.90  ICD-9-CM: 294.20  10/3/2021 - Present Yes        Poorly-controlled hypertension ICD-10-CM: I10  ICD-9-CM: 401.9  10/3/2021 - Present Yes        Hypothyroidism ICD-10-CM: E03.9  ICD-9-CM: 244.9  6/29/2012 - Present Yes        RESOLVED: Acute pulmonary edema with congestive heart failure (HCC) ICD-10-CM: I50.1  ICD-9-CM: 428.1  10/11/2021 - 10/11/2021 Clinically Undetermined        RESOLVED: Atrial fibrillation with RVR (HCC) ICD-10-CM: I48.91  ICD-9-CM: 427.31  10/10/2021 - 10/11/2021 Clinically Undetermined        RESOLVED: UTI (urinary tract infection) ICD-10-CM: N39.0  ICD-9-CM: 599.0  7/2/2012 - 10/11/2021 Yes              Objective:     Patient Vitals for the past 24 hrs:   Temp Pulse Resp BP SpO2   10/14/21 0800 97.5 °F (36.4 °C) 81 19 (!) 159/81 92 %   10/14/21 0400 97.7 °F (36.5 °C) (!) 59 18 (!) 153/84 93 %   10/14/21 0000 98.2 °F (36.8 °C) 68 18 139/65 95 %   10/13/21 2000 97.4 °F (36.3 °C) 67 18 (!) 142/72 92 %   10/13/21 1600 97.6 °F (36.4 °C) 70 18 134/81 90 %   10/13/21 1200 97 °F (36.1 °C) 64 18 (!) 125/56 96 %     Oxygen Therapy  O2 Sat (%): 92 % (10/14/21 0800)  Pulse via Oximetry: 78 beats per minute (10/12/21 1352)  O2 Device: Nasal cannula (10/13/21 1920)  Skin Assessment: Other (see comment/note) (no breakdown; mucosa dry) (10/09/21 1900)  Skin Protection for O2 Device: N/A (10/03/21 2001)  O2 Flow Rate (L/min): 3 l/min (10/13/21 1920)  O2 Temperature: 35.6 °F (2 °C) (10/09/21 0705)    Estimated body mass index is 26.88 kg/m² as calculated from the following:    Height as of this encounter: 5' 5\" (1.651 m). Weight as of this encounter: 73.3 kg (161 lb 8 oz). Intake/Output Summary (Last 24 hours) at 10/14/2021 1050  Last data filed at 10/14/2021 0506  Gross per 24 hour   Intake 470 ml   Output 450 ml   Net 20 ml         Physical Exam:     Visit Vitals  BP (!) 159/81 (BP 1 Location: Left upper arm, BP Patient Position: At rest)   Pulse 81   Temp 97.5 °F (36.4 °C)   Resp 19   Ht 5' 5\" (1.651 m)   Wt 73.3 kg (161 lb 8 oz)   SpO2 92%   BMI 26.88 kg/m²        Blood pressure (!) 159/81, pulse 81, temperature 97.5 °F (36.4 °C), resp. rate 19, height 5' 5\" (1.651 m), weight 73.3 kg (161 lb 8 oz), SpO2 92 %. General:    Well-nourished. Mostly lying in bed. Opens eyes when called. Answers simple questions. Head:  Normocephalic, atraumatic, pale, no jaundice  Eyes:  Sclerae appear normal.  Pupils equally round. ENT:  Nares appear normal, no drainage. Moist oral mucosa  Neck:  No restricted ROM. Trachea midline   CV:   RRR. No m/r/g. No jugular venous distension. Lungs:   CTAB. No wheezing, rhonchi, or rales. Respirations even, unlabored  Abdomen: Bowel sounds present. Soft, nontender, nondistended. Extremities: No cyanosis or clubbing. No edema  Skin:     No rashes and normal coloration. Warm and dry. Neuro:  CN II-XII grossly intact.    Appears to be moving extremities       I have reviewed ordered lab tests and independently visualized imaging below:    Recent Labs:  Recent Results (from the past 48 hour(s))   METABOLIC PANEL, BASIC    Collection Time: 10/13/21  6:28 AM   Result Value Ref Range    Sodium 145 136 - 145 mmol/L    Potassium 3.5 3.5 - 5.1 mmol/L    Chloride 112 (H) 98 - 107 mmol/L    CO2 28 21 - 32 mmol/L    Anion gap 5 (L) 7 - 16 mmol/L    Glucose 110 (H) 65 - 100 mg/dL    BUN 46 (H) 8 - 23 MG/DL    Creatinine 1.23 (H) 0.6 - 1.0 MG/DL    GFR est AA 54 (L) >60 ml/min/1.73m2    GFR est non-AA 44 (L) >60 ml/min/1.73m2    Calcium 9.1 8.3 - 10.4 MG/DL   MAGNESIUM    Collection Time: 10/13/21  6:28 AM   Result Value Ref Range    Magnesium 2.1 1.8 - 2.4 mg/dL   PHOSPHORUS    Collection Time: 10/13/21  6:28 AM   Result Value Ref Range    Phosphorus 1.7 (L) 2.3 - 3.7 MG/DL   METABOLIC PANEL, BASIC    Collection Time: 10/14/21  4:22 AM   Result Value Ref Range    Sodium 147 (H) 136 - 145 mmol/L    Potassium 3.7 3.5 - 5.1 mmol/L    Chloride 112 (H) 98 - 107 mmol/L    CO2 31 21 - 32 mmol/L    Anion gap 4 (L) 7 - 16 mmol/L    Glucose 114 (H) 65 - 100 mg/dL    BUN 42 (H) 8 - 23 MG/DL    Creatinine 1.20 (H) 0.6 - 1.0 MG/DL    GFR est AA 55 (L) >60 ml/min/1.73m2    GFR est non-AA 46 (L) >60 ml/min/1.73m2    Calcium 8.8 8.3 - 10.4 MG/DL       All Micro Results     Procedure Component Value Units Date/Time    COVID-19 RAPID TEST [[id number]] Collected: 10/12/21 0926    Order Status: Completed Specimen: Nasopharyngeal Updated: 10/12/21 1014     Specimen source NASAL        COVID-19 rapid test Not detected        Comment:      The specimen is NEGATIVE for SARS-CoV-2, the novel coronavirus associated with COVID-19. A negative result does not rule out COVID-19. This test has been authorized by the FDA under an Emergency Use Authorization (EUA) for use by authorized laboratories.         Fact sheet for Healthcare Providers: ConventionUpdate.co.nz  Fact sheet for Patients: ConventionUpdate.co.nz       Methodology: Isothermal Nucleic Acid Amplification         CULTURE, URINE [[id number]]  (Abnormal)  (Susceptibility) Collected: 10/04/21 2107    Order Status: Completed Specimen: Cath Urine Updated: 10/07/21 0749     Special Requests: NO SPECIAL REQUESTS        Culture result:       >100,000 COLONIES/mL KLEBSIELLA PNEUMONIAE                Other Studies:  No results found. Current Meds:  Current Facility-Administered Medications   Medication Dose Route Frequency    NUTRITIONAL SUPPORT ELECTROLYTE PRN ORDERS   Does Not Apply PRN    levothyroxine (SYNTHROID) tablet 112 mcg  112 mcg Per G Tube ACB    amLODIPine (NORVASC) tablet 10 mg  10 mg Per G Tube DAILY    carvediloL (COREG) tablet 6.25 mg  6.25 mg Per G Tube BID WITH MEALS    cloNIDine (CATAPRES) 0.3 mg/24 hr patch 1 Patch  1 Patch TransDERmal Q7D    hydrALAZINE (APRESOLINE) 20 mg/mL injection 15 mg  15 mg IntraVENous Q4H PRN    central line flush (saline) syringe 10 mL  10 mL InterCATHeter Q8H    [Held by provider] furosemide (LASIX) injection 40 mg  40 mg IntraVENous BID    [Held by provider] QUEtiapine (SEROquel) tablet 12.5 mg  12.5 mg Oral QHS    acetaminophen (TYLENOL) tablet 650 mg  650 mg Oral Q4H PRN    [Held by provider] levETIRAcetam (KEPPRA) 500 mg in 0.9% sodium chloride (MBP/ADV) 100 mL MBP  500 mg IntraVENous Q12H    [Held by provider] divalproex (DEPAKOTE SPRINKLE) capsule 125 mg  125 mg Oral Q12H    LORazepam (ATIVAN) injection 1 mg  1 mg IntraVENous Q4H PRN    sodium chloride (NS) flush 5-40 mL  5-40 mL IntraVENous PRN    ondansetron (ZOFRAN) injection 8 mg  8 mg IntraVENous Q8H PRN       Signed:  David Christianson MD    Part of this note may have been written by using a voice dictation software. The note has been proof read but may still contain some grammatical/other typographical errors.

## 2021-10-15 ENCOUNTER — APPOINTMENT (OUTPATIENT)
Dept: GENERAL RADIOLOGY | Age: 81
DRG: 064 | End: 2021-10-15
Attending: INTERNAL MEDICINE
Payer: MEDICARE

## 2021-10-15 LAB
ANION GAP SERPL CALC-SCNC: 2 MMOL/L (ref 7–16)
BUN SERPL-MCNC: 35 MG/DL (ref 8–23)
CALCIUM SERPL-MCNC: 9 MG/DL (ref 8.3–10.4)
CHLORIDE SERPL-SCNC: 111 MMOL/L (ref 98–107)
CO2 SERPL-SCNC: 33 MMOL/L (ref 21–32)
CREAT SERPL-MCNC: 1.16 MG/DL (ref 0.6–1)
GLUCOSE SERPL-MCNC: 127 MG/DL (ref 65–100)
MAGNESIUM SERPL-MCNC: 2 MG/DL (ref 1.8–2.4)
PHOSPHATE SERPL-MCNC: 2.8 MG/DL (ref 2.3–3.7)
POTASSIUM SERPL-SCNC: 3.6 MMOL/L (ref 3.5–5.1)
SODIUM SERPL-SCNC: 146 MMOL/L (ref 136–145)

## 2021-10-15 PROCEDURE — 74230 X-RAY XM SWLNG FUNCJ C+: CPT

## 2021-10-15 PROCEDURE — 74011000250 HC RX REV CODE- 250: Performed by: INTERNAL MEDICINE

## 2021-10-15 PROCEDURE — 74011250636 HC RX REV CODE- 250/636: Performed by: FAMILY MEDICINE

## 2021-10-15 PROCEDURE — 86580 TB INTRADERMAL TEST: CPT | Performed by: INTERNAL MEDICINE

## 2021-10-15 PROCEDURE — 83735 ASSAY OF MAGNESIUM: CPT

## 2021-10-15 PROCEDURE — 84100 ASSAY OF PHOSPHORUS: CPT

## 2021-10-15 PROCEDURE — 65270000029 HC RM PRIVATE

## 2021-10-15 PROCEDURE — 97530 THERAPEUTIC ACTIVITIES: CPT

## 2021-10-15 PROCEDURE — 74011250637 HC RX REV CODE- 250/637: Performed by: INTERNAL MEDICINE

## 2021-10-15 PROCEDURE — 97112 NEUROMUSCULAR REEDUCATION: CPT

## 2021-10-15 PROCEDURE — 74011250636 HC RX REV CODE- 250/636: Performed by: INTERNAL MEDICINE

## 2021-10-15 PROCEDURE — 92611 MOTION FLUOROSCOPY/SWALLOW: CPT

## 2021-10-15 PROCEDURE — 97535 SELF CARE MNGMENT TRAINING: CPT

## 2021-10-15 PROCEDURE — 80048 BASIC METABOLIC PNL TOTAL CA: CPT

## 2021-10-15 RX ORDER — NYSTATIN 100000 [USP'U]/ML
500000 SUSPENSION ORAL 4 TIMES DAILY
Status: DISCONTINUED | OUTPATIENT
Start: 2021-10-15 | End: 2021-10-18 | Stop reason: ALTCHOICE

## 2021-10-15 RX ORDER — NYSTATIN 100000 [USP'U]/G
POWDER TOPICAL 2 TIMES DAILY
Status: DISCONTINUED | OUTPATIENT
Start: 2021-10-15 | End: 2021-10-22 | Stop reason: HOSPADM

## 2021-10-15 RX ADMIN — Medication 10 ML: at 04:59

## 2021-10-15 RX ADMIN — HYDRALAZINE HYDROCHLORIDE 15 MG: 20 INJECTION INTRAMUSCULAR; INTRAVENOUS at 05:04

## 2021-10-15 RX ADMIN — LEVOTHYROXINE SODIUM 112 MCG: 0.11 TABLET ORAL at 04:59

## 2021-10-15 RX ADMIN — BARIUM SULFATE 30 ML: 400 SUSPENSION ORAL at 09:24

## 2021-10-15 RX ADMIN — Medication 10 ML: at 11:22

## 2021-10-15 RX ADMIN — Medication 10 ML: at 21:42

## 2021-10-15 RX ADMIN — NYSTATIN 500000 UNITS: 100000 SUSPENSION ORAL at 21:42

## 2021-10-15 RX ADMIN — NYSTATIN 500000 UNITS: 100000 SUSPENSION ORAL at 11:21

## 2021-10-15 RX ADMIN — BARIUM SULFATE 30 ML: 980 POWDER, FOR SUSPENSION ORAL at 09:24

## 2021-10-15 RX ADMIN — NYSTATIN: 100000 POWDER TOPICAL at 16:22

## 2021-10-15 RX ADMIN — CARVEDILOL 6.25 MG: 6.25 TABLET, FILM COATED ORAL at 08:25

## 2021-10-15 RX ADMIN — AMLODIPINE BESYLATE 10 MG: 10 TABLET ORAL at 08:25

## 2021-10-15 RX ADMIN — NYSTATIN: 100000 POWDER TOPICAL at 11:21

## 2021-10-15 RX ADMIN — TUBERCULIN PURIFIED PROTEIN DERIVATIVE 5 UNITS: 5 INJECTION, SOLUTION INTRADERMAL at 16:21

## 2021-10-15 RX ADMIN — NYSTATIN 500000 UNITS: 100000 SUSPENSION ORAL at 16:22

## 2021-10-15 RX ADMIN — ONDANSETRON 8 MG: 2 INJECTION INTRAMUSCULAR; INTRAVENOUS at 11:21

## 2021-10-15 RX ADMIN — BARIUM SULFATE 15 ML: 400 PASTE ORAL at 09:23

## 2021-10-15 RX ADMIN — ACETAMINOPHEN 650 MG: 325 TABLET ORAL at 16:22

## 2021-10-15 NOTE — PROGRESS NOTES
Problem: Patient Education: Go to Patient Education Activity  Goal: Patient/Family Education  Outcome: Progressing Towards Goal     Problem: Hemorrhagic Stroke: Day 5 through Discharge  Goal: Activity/Safety  Outcome: Progressing Towards Goal  Goal: Consults, if ordered  Outcome: Progressing Towards Goal  Goal: Diagnostic Test/Procedures  Outcome: Progressing Towards Goal  Goal: Nutrition/Diet  Outcome: Progressing Towards Goal  Goal: Medications  Outcome: Progressing Towards Goal  Goal: Respiratory  Outcome: Progressing Towards Goal  Goal: Treatments/Interventions/Procedures  Outcome: Progressing Towards Goal  Goal: Psychosocial  Outcome: Progressing Towards Goal  Goal: *Hemodynamically stable  Outcome: Progressing Towards Goal  Goal: *Verbalizes anxiety and depression are reduced or absent  Outcome: Progressing Towards Goal  Goal: *Absence of aspiration  Outcome: Progressing Towards Goal  Goal: *Absence of signs and symptoms of DVT  Outcome: Progressing Towards Goal  Goal: *Optimal pain control at patient's stated goal  Outcome: Progressing Towards Goal  Goal: *Tolerating diet  Outcome: Progressing Towards Goal  Goal: *Progressive mobility and function  Outcome: Progressing Towards Goal  Goal: *Rehabilitation readiness  Outcome: Progressing Towards Goal     Problem: Falls - Risk of  Goal: *Absence of Falls  Description: Document Claire Fall Risk and appropriate interventions in the flowsheet.   Outcome: Progressing Towards Goal  Note: Fall Risk Interventions:  Mobility Interventions: Bed/chair exit alarm    Mentation Interventions: Bed/chair exit alarm    Medication Interventions: Bed/chair exit alarm    Elimination Interventions: Call light in reach    History of Falls Interventions: Bed/chair exit alarm         Problem: Patient Education: Go to Patient Education Activity  Goal: Patient/Family Education  Outcome: Progressing Towards Goal     Problem: Patient Education: Go to Patient Education Activity  Goal: Patient/Family Education  Outcome: Progressing Towards Goal     Problem: Pressure Injury - Risk of  Goal: *Prevention of pressure injury  Description: Document Romeo Scale and appropriate interventions in the flowsheet.   Outcome: Progressing Towards Goal  Note: Pressure Injury Interventions:  Sensory Interventions: Assess changes in LOC, Minimize linen layers    Moisture Interventions: Check for incontinence Q2 hours and as needed, Limit adult briefs    Activity Interventions: Increase time out of bed    Mobility Interventions: Pressure redistribution bed/mattress (bed type)    Nutrition Interventions: Document food/fluid/supplement intake, Offer support with meals,snacks and hydration    Friction and Shear Interventions: Foam dressings/transparent film/skin sealants                Problem: Patient Education: Go to Patient Education Activity  Goal: Patient/Family Education  Outcome: Progressing Towards Goal     Problem: Patient Education: Go to Patient Education Activity  Goal: Patient/Family Education  Outcome: Progressing Towards Goal     Problem: Delirium Treatment  Goal: *Level of consciousness restored to baseline  Outcome: Progressing Towards Goal  Goal: *Level of environmental perceptions restored to baseline  Outcome: Progressing Towards Goal  Goal: *Sensory perception restored to baseline  Outcome: Progressing Towards Goal  Goal: *Emotional stability restored to baseline  Outcome: Progressing Towards Goal  Goal: *Functional assessment restored to baseline  Outcome: Progressing Towards Goal  Goal: *Absence of falls  Outcome: Progressing Towards Goal  Goal: *Will remain free of delirium, CAM Score negative  Outcome: Progressing Towards Goal  Goal: *Cognitive status will be restored to baseline  Outcome: Progressing Towards Goal  Goal: Interventions  Outcome: Progressing Towards Goal     Problem: Patient Education: Go to Patient Education Activity  Goal: Patient/Family Education  Outcome: Progressing Towards Goal

## 2021-10-15 NOTE — PROGRESS NOTES
Hospitalist Progress Note   Admit Date:  10/3/2021  2:16 AM   Name:  West Mtz   Age:  78 y.o. Sex:  female  :  1940   MRN:  [id number]   Room:  [facility]    Presenting Complaint: Headache    Reason(s) for Admission: Intracranial bleed ([facility] [I62.9]  Dementia ([facility] [F03.90]  Poorly-controlled hypertension [I10]  Hypothyroidism [E03.9]  Encephalopathy, unspecified [G93.40]     Hospital Course & Interval History:     Patient with past medical history of  Hypertension  Dementia  Hypothyroidism    Patient presented to ER on October 3 due to headache and confusion. CT head showed right temporal IPH. 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 Neurosurgery evaluated the patient and did not feel that the bleeding was related to aneurysm. Patient was admitted to ICU. On October 3, code as was called when she developed symptoms related to increased intracranial pressure. She was given mannitol, and Cardene. She developed intermittent delirium and agitation. She started on Depakote and Seroquel. She was encephalopathic for many days since admission. She started to be more alert, more cooperative with physical therapy recently. Subjective (10/15/21):  2021  Patient is lying quietly in bed. Not answering question. Not appearing to be in overt distress. No fever. 2021  Patient is lying in bed, no response to verbal stimuli. .   NG tube was successfully inserted. Patient is getting feeding through it. 2021  Reportedly patient seems more alert, more responsive verbally. She is more able to participate in physical therapy yesterday. At one point speech therapist signed her off because she was not participating in sessions enough. I have discussed with her daughter yesterday on the phone.   She indicated to me that if the patient required PEG tube placement, she would allow that. October 15, 2021  Patient is alert. Following commands. No fever. No shortness of breath      Assessment & Plan:     Principal Problem:    Hemorrhage of right temporal lobe ([facility] (10/10/2021)  With several subsequent CT of the brain, showing stable hemorrhage. Avoid antiplatelet, anticoagulants. Continue physical therapy. CTA showed aneurysm but not in the area of her hemorrhage. Not a candidate for surgical intervention at this time. More alert. More cooperative with management. Answering questions appropriately. Following commands now. Improved encephalopathy. With her more alertness, hopefully the patient will start eating by herself. We will check swallowing study. Continue physical therapy. Active Problems:    Hypothyroidism (6/29/2012)  On supplemental levothyroxine      Dementia ([facility] (10/3/2021)        Poorly-controlled hypertension (10/3/2021)  On amlodipine, carvedilol, clonidine patch      LEODAN (acute kidney injury) ([facility] (10/10/2021)  Improved creatinine. Monitor renal function and intake and output. Avoid nephrotoxic agents. Acute metabolic encephalopathy (29/82/5169)  From intracranial hemorrhage. Will see how her condition will involve further. Pleural effusion, bilateral (10/11/2021)  Small. Monitor. Chronic diastolic congestive heart failure ([facility] (10/11/2021)  No decompensation. Monitor. PAF (paroxysmal atrial fibrillation) ([facility] (10/11/2021)  Not a candidate for anticoagulation        Dispo/Discharge Planning: To be determined. Likely will need placement. I discussed with  who will continue to assess her progress, and situation regarding placement options. Diet:  DIET NPO  ADULT TUBE FEEDING Nasogastric; Standard with Fiber; Delivery Method: Continuous; Continuous Initial Rate (mL/hr): 10; Continuous Advance Tube Feeding: Yes; Advancement Volume (mL/hr): 10;  Advancement Frequency: Other (Specify); Specify Other Adva. ..   DVT PPx: SCD   Code status: Full Code         Hospital Problems as of 10/15/2021 Never Reviewed        Codes Class Noted - Resolved POA    Pleural effusion, bilateral ICD-10-CM: J90  ICD-9-CM: 511.9  10/11/2021 - Present Yes        Chronic diastolic congestive heart failure (HCC) ICD-10-CM: I50.32  ICD-9-CM: 428.32, 428.0  10/11/2021 - Present Yes        PAF (paroxysmal atrial fibrillation) (HCC) ICD-10-CM: I48.0  ICD-9-CM: 427.31  10/11/2021 - Present Yes        LEODAN (acute kidney injury) ([facility] ICD-10-CM: N17.9  ICD-9-CM: 584.9  10/10/2021 - Present No        Acute metabolic encephalopathy CLIVE-47-MZ: G93.41  ICD-9-CM: 348.31  10/10/2021 - Present Yes        * (Principal) Hemorrhage of right temporal lobe [facility]) ICD-10-CM: I61.1  ICD-9-CM: 431  10/10/2021 - Present Yes        Dementia ([facility] ICD-10-CM: F03.90  ICD-9-CM: 294.20  10/3/2021 - Present Yes        Poorly-controlled hypertension ICD-10-CM: I10  ICD-9-CM: 401.9  10/3/2021 - Present Yes        Hypothyroidism ICD-10-CM: E03.9  ICD-9-CM: 244.9  6/29/2012 - Present Yes        RESOLVED: Acute pulmonary edema with congestive heart failure (HCC) ICD-10-CM: I50.1  ICD-9-CM: 428.1  10/11/2021 - 10/11/2021 Clinically Undetermined        RESOLVED: Atrial fibrillation with RVR (HCC) ICD-10-CM: I48.91  ICD-9-CM: 427.31  10/10/2021 - 10/11/2021 Clinically Undetermined        RESOLVED: UTI (urinary tract infection) ICD-10-CM: N39.0  ICD-9-CM: 599.0  7/2/2012 - 10/11/2021 Yes              Objective:     Patient Vitals for the past 24 hrs:   Temp Pulse Resp BP SpO2   10/15/21 0800 97.6 °F (36.4 °C) 79 18 (!) 146/73 96 %   10/15/21 0400 97.1 °F (36.2 °C) 64 16 (!) 161/85 98 %   10/15/21 0000 97.7 °F (36.5 °C) 67 20 (!) 149/76 97 %   10/14/21 2000 97.7 °F (36.5 °C) 65 20 125/76 96 %   10/14/21 1600 97.4 °F (36.3 °C) 90 18 (!) 142/69 100 %   10/14/21 1200 98.1 °F (36.7 °C) (!) 59 18 119/72 94 %     Oxygen Therapy  O2 Sat (%): 96 % (10/15/21 0800)  Pulse via Oximetry: 78 beats per minute (10/12/21 1352)  O2 Device: Nasal cannula (10/14/21 1920)  Skin Assessment: Other (see comment/note) (no breakdown; mucosa dry) (10/09/21 1900)  Skin Protection for O2 Device: N/A (10/03/21 2001)  O2 Flow Rate (L/min): 3 l/min (10/14/21 1920)  O2 Temperature: 35.6 °F (2 °C) (10/09/21 0705)    Estimated body mass index is 26.94 kg/m² as calculated from the following:    Height as of this encounter: 5' 5\" (1.651 m). Weight as of this encounter: 73.4 kg (161 lb 14.4 oz). Intake/Output Summary (Last 24 hours) at 10/15/2021 0924  Last data filed at 10/15/2021 0717  Gross per 24 hour   Intake 2718 ml   Output 550 ml   Net 2168 ml         Physical Exam:     Visit Vitals  BP (!) 146/73 (BP 1 Location: Left upper arm, BP Patient Position: Supine)   Pulse 79   Temp 97.6 °F (36.4 °C)   Resp 18   Ht 5' 5\" (1.651 m)   Wt 73.4 kg (161 lb 14.4 oz)   SpO2 96%   BMI 26.94 kg/m²        Blood pressure (!) 146/73, pulse 79, temperature 97.6 °F (36.4 °C), resp. rate 18, height 5' 5\" (1.651 m), weight 73.4 kg (161 lb 14.4 oz), SpO2 96 %. General:    Well-nourished.   lying in bed. Fully alert . Following commands well .  head:  Normocephalic, atraumatic, pale, no jaundice  Eyes:  Sclerae appear normal.  Pupils equally round. ENT:  Nares appear normal, no drainage. Moist oral mucosa  Neck:  No restricted ROM. Trachea midline   CV:   RRR. No m/r/g. No jugular venous distension. Lungs:   CTAB. No wheezing, rhonchi, or rales. Respirations even, unlabored  Abdomen: Bowel sounds present. Soft, nontender, nondistended. Extremities: No cyanosis or clubbing. No edema. Able to move all extremities. Skin:     No rashes and normal coloration. Warm and dry. Neuro:  CN II-XII grossly intact. moving all extremities following commands.        I have reviewed ordered lab tests and independently visualized imaging below:    Recent Labs:  Recent Results (from the past 48 hour(s)) METABOLIC PANEL, BASIC    Collection Time: 10/14/21  4:22 AM   Result Value Ref Range    Sodium 147 (H) 136 - 145 mmol/L    Potassium 3.7 3.5 - 5.1 mmol/L    Chloride 112 (H) 98 - 107 mmol/L    CO2 31 21 - 32 mmol/L    Anion gap 4 (L) 7 - 16 mmol/L    Glucose 114 (H) 65 - 100 mg/dL    BUN 42 (H) 8 - 23 MG/DL    Creatinine 1.20 (H) 0.6 - 1.0 MG/DL    GFR est AA 55 (L) >60 ml/min/1.73m2    GFR est non-AA 46 (L) >60 ml/min/1.73m2    Calcium 8.8 8.3 - 09.3 MG/DL   METABOLIC PANEL, BASIC    Collection Time: 10/15/21  4:50 AM   Result Value Ref Range    Sodium 146 (H) 136 - 145 mmol/L    Potassium 3.6 3.5 - 5.1 mmol/L    Chloride 111 (H) 98 - 107 mmol/L    CO2 33 (H) 21 - 32 mmol/L    Anion gap 2 (L) 7 - 16 mmol/L    Glucose 127 (H) 65 - 100 mg/dL    BUN 35 (H) 8 - 23 MG/DL    Creatinine 1.16 (H) 0.6 - 1.0 MG/DL    GFR est AA 57 (L) >60 ml/min/1.73m2    GFR est non-AA 47 (L) >60 ml/min/1.73m2    Calcium 9.0 8.3 - 10.4 MG/DL   MAGNESIUM    Collection Time: 10/15/21  4:50 AM   Result Value Ref Range    Magnesium 2.0 1.8 - 2.4 mg/dL   PHOSPHORUS    Collection Time: 10/15/21  4:50 AM   Result Value Ref Range    Phosphorus 2.8 2.3 - 3.7 MG/DL       All Micro Results     Procedure Component Value Units Date/Time    COVID-19 RAPID TEST [[id number]] Collected: 10/12/21 0926    Order Status: Completed Specimen: Nasopharyngeal Updated: 10/12/21 1014     Specimen source NASAL        COVID-19 rapid test Not detected        Comment:      The specimen is NEGATIVE for SARS-CoV-2, the novel coronavirus associated with COVID-19. A negative result does not rule out COVID-19. This test has been authorized by the FDA under an Emergency Use Authorization (EUA) for use by authorized laboratories.         Fact sheet for Healthcare Providers: ConventionUpdate.co.nz  Fact sheet for Patients: ConventionUpdate.co.nz       Methodology: Isothermal Nucleic Acid Amplification         CULTURE, URINE [[id number]]  (Abnormal)  (Susceptibility) Collected: 10/04/21 2107    Order Status: Completed Specimen: Cath Urine Updated: 10/07/21 0749     Special Requests: NO SPECIAL REQUESTS        Culture result:       >100,000 COLONIES/mL KLEBSIELLA PNEUMONIAE                Other Studies:  XR ABD (KUB)    Result Date: 10/14/2021  KUB HISTORY: NG tube placement COMPARISON: 10/9/2021 FINDINGS: A hiatal hernia is present. A feeding tube extends into the stomach within the hernia. Lower lobe atelectasis is present. A right-sided PICC line terminates in the SVC. 1. Feeding tube tip within the stomach. Hiatal hernia. 2. Lower lobe atelectasis.       Current Meds:  Current Facility-Administered Medications   Medication Dose Route Frequency    NUTRITIONAL SUPPORT ELECTROLYTE PRN ORDERS   Does Not Apply PRN    levothyroxine (SYNTHROID) tablet 112 mcg  112 mcg Per G Tube ACB    amLODIPine (NORVASC) tablet 10 mg  10 mg Per G Tube DAILY    carvediloL (COREG) tablet 6.25 mg  6.25 mg Per G Tube BID WITH MEALS    cloNIDine (CATAPRES) 0.3 mg/24 hr patch 1 Patch  1 Patch TransDERmal Q7D    hydrALAZINE (APRESOLINE) 20 mg/mL injection 15 mg  15 mg IntraVENous Q4H PRN    central line flush (saline) syringe 10 mL  10 mL InterCATHeter Q8H    [Held by provider] furosemide (LASIX) injection 40 mg  40 mg IntraVENous BID    [Held by provider] QUEtiapine (SEROquel) tablet 12.5 mg  12.5 mg Oral QHS    acetaminophen (TYLENOL) tablet 650 mg  650 mg Oral Q4H PRN    [Held by provider] levETIRAcetam (KEPPRA) 500 mg in 0.9% sodium chloride (MBP/ADV) 100 mL MBP  500 mg IntraVENous Q12H    [Held by provider] divalproex (DEPAKOTE SPRINKLE) capsule 125 mg  125 mg Oral Q12H    LORazepam (ATIVAN) injection 1 mg  1 mg IntraVENous Q4H PRN    sodium chloride (NS) flush 5-40 mL  5-40 mL IntraVENous PRN    ondansetron (ZOFRAN) injection 8 mg  8 mg IntraVENous Q8H PRN       Signed:  Clarissa Oconnor MD    Part of this note may have been written by using a voice dictation software. The note has been proof read but may still contain some grammatical/other typographical errors.

## 2021-10-15 NOTE — PROGRESS NOTES
's follow-up visit with Ms. Viviana Alva to convey care and concern.      Myah Warner 66  Board Certified

## 2021-10-15 NOTE — PROGRESS NOTES
ACUTE OT GOALS:  (Developed with and agreed upon by patient and/or caregiver.)  1. Patient will follow 75% of one step verbal or visual commands to increase participation during ADL performance. 2. Patient will tolerate 15 minutes static sitting balance unsupported with CGA while completing functional activity. 3. Patient will tolerate 25  minutes of OT treatment with 2-3 rest breaks to increase activity tolerance for ADLs. 4. Patient will complete functional transfers with CGA and adaptive equipment as needed. 5. Patient will complete upper body bathing and dressing with SBA and adaptive equipment as needed. 6. Patient will complete self-grooming with SBA and adaptive equipment as needed. 7. Patient will tolerate 30 minutes BUE therapeutic exercises to increase functional use during ADL performance.     Timeframe: 7 visits     OCCUPATIONAL THERAPY: Daily Note OT Treatment Day # 5    Karissa Brochure is a 78 y.o. female   PRIMARY DIAGNOSIS: Hemorrhage of right temporal lobe (HCC)  Intracranial bleed ([facility] [I62.9]  Dementia ([facility] [F03.90]  Poorly-controlled hypertension [I10]  Hypothyroidism [E03.9]  Encephalopathy, unspecified [G93.40]  Procedure(s) (LRB):  ESOPHAGOGASTRODUODENOSCOPY (EGD) with NG tube placement Room 715 (N/A)  3 Days Post-Op  Payor: [employer]ICARE / Plan: [place] Yoo / Product Type: Managed Care Medicare /   ASSESSMENT:     REHAB RECOMMENDATIONS: CURRENT LEVEL OF FUNCTION:  (Most Recently Demonstrated)   Recommendation to date pending progress:  Setting:   Short-term Rehab  Equipment:    To Be Determined Bathing:   Not tested  Dressing:   Not tested  Feeding/Grooming:   Supervision  Toileting:   Not tested  Functional Mobility:   Moderate Assistance x 2     ASSESSMENT:  Ms. Angelica Calderon is doing fair today. Pt presents supine in bed. Pt required mod A x2 for bed mobility, sit <> stand transfers to recliner, and functional mobility. Fair balance noted during transfer today. Pt c/o of nausea throughout session and RN was notified and medicated. Pt now able to have pureed diet with nectar liquids. Able to perform cup to mouth with supervision. No trouble with drinking today. Left up in chair with all needs in reach. Making some progress towards goals this session. Pt would continue to benefit from skilled OT during stay. SUBJECTIVE:   Ms. Srinivasan Ends states \"I need something cold\".     SOCIAL HISTORY/LIVING ENVIRONMENT:   Support Systems: Spouse/Significant Other Dejah Paulino [phone] (spouse))    OBJECTIVE:     PAIN: VITAL SIGNS: LINES/DRAINS:   Pre Treatment:    Post Treatment: 0   Reynaga Catheter, IV and Nasogastric Tube  O2 Device: Nasal cannula     ACTIVITIES OF DAILY LIVING: I Mod I S SBA CGA Min Mod Max Total NT Comments   BASIC ADLs:              Bathing/ Showering [] [] [] [] [] [] [] [] [] [x]    Toileting [] [] [] [] [] [] [] [] [] [x]    Dressing [] [] [] [] [] [] [] [] [] [x]    Feeding [] [] [x] [] [] [] [] [] [] []    Grooming [] [] [] [] [] [] [] [] [] [x]    Personal Device Care [] [] [] [] [] [] [] [] [] [x]    Functional Mobility [] [] [] [] [] [] [x] [] [] [] x2   I=Independent, Mod I=Modified Independent, S=Supervision, SBA=Standby Assistance, CGA=Contact Guard Assistance,   Min=Minimal Assistance, Mod=Moderate Assistance, Max=Maximal Assistance, Total=Total Assistance, NT=Not Tested    MOBILITY: I Mod I S SBA CGA Min Mod Max Total  NT x2 Comments:   Supine to sit [] [] [] [] [] [] [x] [] [] [] []    Sit to supine [] [] [] [] [] [] [] [] [] [x] []    Sit to stand [] [] [] [] [] [] [x] [] [] [] [x]    Bed to chair [] [] [] [] [] [] [x] [] [] [] [x]    I=Independent, Mod I=Modified Independent, S=Supervision, SBA=Standby Assistance, CGA=Contact Guard Assistance,   Min=Minimal Assistance, Mod=Moderate Assistance, Max=Maximal Assistance, Total=Total Assistance, NT=Not Tested    BALANCE: Good Fair+ Fair Fair- Poor NT Comments   Sitting Static [] [] [] [x] [] [] Sitting Dynamic [] [] [] [x] [] []              Standing Static [] [] [] [x] [] []    Standing Dynamic [] [] [] [x] [x] []      PLAN:   FREQUENCY/DURATION: OT Plan of Care: 3 times/week for duration of hospital stay or until stated goals are met, whichever comes first.    TREATMENT:   TREATMENT:   ($$ Self Care/Home Management: 8-22 mins$$ Therapeutic Activity: 8-22 mins   )  Co-Treatment PT/OT necessary due to patient's decreased overall endurance/tolerance levels, as well as need for high level skilled assistance to complete functional transfers/mobility and functional tasks  Therapeutic Activity (15 Minutes): Therapeutic activity included Supine to Sit, Scooting, Transfer Training, Ambulation on level ground, Sitting balance  and Standing balance to improve functional Mobility, Strength and Activity tolerance. Self Care (9 Minutes): Self care including Self Feeding and Grooming to increase independence and decrease level of assistance required.     TREATMENT GRID:  N/A    AFTER TREATMENT POSITION/PRECAUTIONS:  Alarm Activated, Chair and Needs within reach    INTERDISCIPLINARY COLLABORATION:  RN/PCT, PT/PTA and OT/ALMEIDA    TOTAL TREATMENT DURATION:  OT Patient Time In/Time Out  Time In: 1110  Time Out: [address] [facility],[address], [facility]

## 2021-10-15 NOTE — PROGRESS NOTES
Comprehensive Nutrition Assessment    Type and Reason for Visit: Reassess  Tube Feeding Management (Hospitalist)    Nutrition Recommendations/Plan:    Enteral Nutrition   Resume TF if pt unable to meet >50% of estimated needs via PO intake ; if pt able to tolerate >50% dinner and breakfast in AM, tube feed order to be discontinued  · Enteral Access: Nasogastric  · Formula: Jevity 1.5 Shabbir (Standard with Fiber)  · Delivery: Continuous feedinml/hour. · Water flush 135 ml every 4 hours  · Modulars: None   · Enteral regimen at goal will provide 1650 calories (100% estimated calorie needs), 71 grams protein (99% estimated protein needs) and 1646ml free fluid (~1ml/kcal) calculations based on 22 hour infusion. · Nutritional Supplement Therapy:   · Implement electrolyte replacement per nutrition support protocols  · Replacement indicated:  · No replacements warranted this AM  · Labs:   · EN labs: BMP daily, Mg MWF and Phos MWF.     Meals and Snacks:  Continue current diet. Recommendations per SLP  Nutrition Supplement Therapy:  · Medical food supplement therapy:  Initiate Magic Cup twice per day (this provides 290 kcal and 9 grams protein per serving)     Malnutrition Assessment:  Malnutrition Status: At risk for malnutrition (specify) (adv age, NPO, unknown baseline)    Nutrition Assessment:   Nutrition History: Unable to assess. Nutrition Background: Patient with PMH signifncant for HTN, dementia. She presented with headache and increased confusion. CT of head showed right temporal IPH around 2 cm  Daily Update:  Pt seen in recliner, NGT remains in place-no TF infusing at time of assessment. Pt alert and oriented at this RD visit, able to report she was eating \"well\" PTA, with ~2 meals per day. Pt reports she never consumed breakfast. Pt expresses desire for discharge. Pt states she did not eat lunch today as \"didn't feel up to it. \" Discussed pt with RN.  RN reports pt self discontinued NGT 10/14, however tube was replaced and TF was resumed at goal. Pt s/p MBS this AM w/ clearance for puree diet and nectar thick liquids. Discussed TF orders to remain active until PO intake pattern can be accurately assessed and pt able to meet >50% of estimated needs via PO. Abdominal Status (last documented): Semi-soft abdomen with Active  bowel sounds. Last BM 10/06/21. Pertinent Medications: zofran  Pertinent Labs:  Lab Results   Component Value Date/Time    Sodium 146 (H) 10/15/2021 04:50 AM    Potassium 3.6 10/15/2021 04:50 AM    Chloride 111 (H) 10/15/2021 04:50 AM    CO2 33 (H) 10/15/2021 04:50 AM    Anion gap 2 (L) 10/15/2021 04:50 AM    Glucose 127 (H) 10/15/2021 04:50 AM    BUN 35 (H) 10/15/2021 04:50 AM    Creatinine 1.16 (H) 10/15/2021 04:50 AM    Calcium 9.0 10/15/2021 04:50 AM    Albumin 3.1 (L) 10/04/2021 05:31 AM    Magnesium 2.0 10/15/2021 04:50 AM    Phosphorus 2.8 10/15/2021 04:50 AM   Labs reviewed and remarkable for hypernatremia, all other labs relatively WNL. TF off at time of assessment and unknown infusion time 10/14, therefore no adjustments to TF warranted at this time. Nutrition Related Findings:   NFPE with no physcial signs of malnutrition. 10/11- Pt pending IR for tube placement, TF to begin pending clearance. 10/13- NGT placed per GI 10/12, Jevity 1.5 began infusing 10/12. 10/15- TF remains off following MBS this AM. Now on puree diet w/ nectar thick liquids.       Current Nutrition Therapies:  ADULT TUBE FEEDING   Current Tube Feeding (TF) Orders:   · Feeding Route: Nasogastric  · Formula: Standard with fiber  · Schedule:Continuous    · Regimen: 50ml/hr  · Additives/Modulars:  (None)  · Water Flushes: 135ml Q4H  · Current TF & Flush Orders Provides: on hold d/t recent diet modification  · Goal TF & Flush Orders Provides: Enteral regimen at goal will provide 1650 calories (100% estimated calorie needs), 71 grams protein (99% estimated protein needs) and 1646ml free fluid (~1ml/kcal) calculations based on 22 hour infusion. Current Intake:   Average Meal Intake: Unable to assess Average Supplement Intake: None ordered      Anthropometric Measures:  Height: 5' 5\" (165.1 cm)  Current Body Wt: 73.4 kg (161 lb 13.1 oz) (10/13), Weight source: Bed scale  BMI: 26.9, Overweight (BMI 25.0-29. 9)  Admission Body Weight: 159 lb 6.3 oz (bed scale 10/3)  Ideal Body Weight (lbs) (Calculated): 125 lbs (57 kg), 129.5 %  Usual Body Wt: 72.1 kg (158 lb 15.2 oz) (per EMR review last weighed 4/2 office wt), Percent weight change: -0.4          Edema: No data recorded   Estimated Daily Nutrient Needs:  Energy (kcal/day): 0190-2687 (Kcal/kg (20-25), Weight Used: Current (71.8 kg (10/10)))  Protein (g/day): 72-86 (1-1.2 g/kg) Weight Used: (Current)  Fluid (ml/day):   (1 ml/kcal)    Nutrition Diagnosis:   · Inadequate oral intake related to cognitive or neurological impairment as evidenced by  (recent diet modification from NPO to puree diet)       Nutrition Interventions:   Food and/or Nutrient Delivery: Continue current diet, Start oral nutrition supplement (Restart TF if pt unable to meet >50% of needs via PO intake)     Coordination of Nutrition Care: Continue to monitor while inpatient  Plan of Care discussed with Nick Haney RN    Goals:   Previous Goal Met: Goal(s) achieved  Active Goal: Meet >50% of estimated needs via PO intake vs restart TF    Nutrition Monitoring and Evaluation:      Food/Nutrient Intake Outcomes: Diet advancement/tolerance, Food and nutrient intake, Supplement intake, Enteral nutrition intake/tolerance  Physical Signs/Symptoms Outcomes: Biochemical data, GI status, Meal time behavior, Weight    Discharge Planning:     Too soon to determine    Hilario Vanegas RD, LD  [phone]    Disaster Mode Active

## 2021-10-15 NOTE — PROGRESS NOTES
10/15/21 0654   NIH Stroke Scale   Interval Other (comment)   Level of Conciousness (1a) (!) 1   LOC Questions (1b) (!) 1   LOC Commands (1c) 0   Best Gaze (2) 0   Visual (3) 0   Facial Palsy (4) (!) 1   Motor Arm, Left (5a) 0   Motor Arm, Right (5b) 0   Motor Leg, Left (6a) (!) 1   Motor Leg, Right (6b) (!) 1   Limb Ataxia (7) 0   Sensory (8) 0   Best Language (9) (!) 1   Dysarthria (10) (!) 1   Extinction and Inattention (11) 0   Total 7

## 2021-10-15 NOTE — PROGRESS NOTES
TC to pt's dtr, Levi Brood. Discussed recommendation for STR at dc. She stated she and her father are in agreement. [facility] Referrals submitted. Awaiting responses. Pt's insurance requires precert which the accepting facility will initiate once bed offer is made. New PPD placed since the one placed on 10/8/21 was not read at the 48 & 72 hour times. COVID test will be ordered at the appropriate time. SW following to facilitate pt's transfer to rehab once insurance approves and pt is medically cleared for dc.

## 2021-10-15 NOTE — PROGRESS NOTES
SPEECH PATHOLOGY NOTE:    Modified barium swallow study complete. Recommend pureed diet and mildly thick liquids (nectar) via single straw sip with 1:1 assistance. Full report to follow.          Kalpesh Thompson, [facility], CCC-SLP

## 2021-10-15 NOTE — PROGRESS NOTES
ACUTE PHYSICAL THERAPY GOALS:  (Developed with and agreed upon by patient and/or caregiver.)  1. Patient will perform bed mobility with MODERATE ASSISTANCE within 3 days. MET 10/13/21  2. Patient will transfer sit to stand with MODERATE ASSISTANCE  3. Patient will transfer from bed to chair with MAXIMAL ASSISTANCE within 3 days. 4. Patient will demonstrate GOOD STATIC STANDING balance within 3 day(s). 5. Patient will tolerate 15+ minutes of therapeutic activity/exercise and/or neuromuscular re-education while maintaining stable vitals to improve functional strength and activity tolerance within 3 days. MET 10/13/21     LT. Patient will perform bed mobility with MINIMAL ASSISTANCE within 7 days. 2. Patient will transfer bed to chair with MINIMAL ASSISTANCE within 7 days. 3. Patient will demonstrate GOOD DYNAMIC SITTING balance within 7 day(s). 4. Patient will ambulate 50ft+ using least restrictive assistive device and MINIMAL ASSISTANCE within 7 days. 5. Patient will tolerate 25+ minutes of therapeutic activity/exercise and/or neuromuscular re-education while maintaining stable vitals to improve functional strength and activity tolerance within 7 days.     PHYSICAL THERAPY: Daily Note and AM Treatment Day # 4    Avelino Osborne is a 78 y.o. female   PRIMARY DIAGNOSIS: Hemorrhage of right temporal lobe (HCC)  Intracranial bleed ([facility] [I62.9]  Dementia ([facility] [F03.90]  Poorly-controlled hypertension [I10]  Hypothyroidism [E03.9]  Encephalopathy, unspecified [G93.40]  Procedure(s) (LRB):  ESOPHAGOGASTRODUODENOSCOPY (EGD) with NG tube placement Room 715 (N/A)  3 Days Post-Op    ASSESSMENT:     REHAB RECOMMENDATIONS: CURRENT LEVEL OF FUNCTION:  (Most Recently Demonstrated)   Recommendation to date pending progress:  Setting:   Short-term Rehab  Equipment:    To Be Determined Bed Mobility:   Moderate Assistance  Sit to Stand:   Moderate Assistance x 2  Transfers:   Moderate Assistance x 2  Gait/Mobility:   Not tested     ASSESSMENT:  Ms. Kassy Ivan is an 78year old female admitted with AMS and found to have right intraparenchymal hemorrhage and SDH. Patient seen this AM for PT treatment session. Patient presents alert and motivated to participate. Today, required moderate assistance x1-2 for bed mobility, STS transfers, and transfers bed to chair. Addressed functional mobility with facilitation, sitting and standing balance, and postural retraining. Limited by nausea today; deepali Improvements appreciated each treatment session. Goals remain appropriate and progressing. Continue to recommend STR at discharge. SUBJECTIVE:   Ms. Kassy Ivan states, I feel sick. SOCIAL HISTORY/ LIVING ENVIRONMENT: Ambulatory with a SPC and spouse assists with ADLs PRN.    Support Systems: Spouse/Significant Other Melissa Lakesha [phone] (spouse))  OBJECTIVE:     PAIN: VITAL SIGNS: LINES/DRAINS:   Pre Treatment: Pain Screen  Pain Scale 1: Numeric (0 - 10)  Pain Intensity 1: 0  Post Treatment: FLACC 0   Reynaga Catheter, IV and Nasogastric Tube  O2 Device: Nasal cannula     MOBILITY: I Mod I S SBA CGA Min Mod Max Total  NT x2 Comments:   Bed Mobility    Rolling [] [] [] [] [] [] [] [] [] [x] []    Supine to Sit [] [] [] [] [] [x] [x] [] [] [] [x]    Scooting [] [] [] [] [] [] [x] [] [] [] [x]    Sit to Supine [] [] [] [] [] [] [] [] [] [x] []    Transfers    Sit to Stand [] [] [] [] [] [] [x] [] [] [] [x]    Bed to Chair [] [] [] [] [] [] [x] [] [] [] [x]    Stand to Sit [] [] [] [] [] [] [x] [] [] [] [x]    I=Independent, Mod I=Modified Independent, S=Supervision, SBA=Standby Assistance, CGA=Contact Guard Assistance,   Min=Minimal Assistance, Mod=Moderate Assistance, Max=Maximal Assistance, Total=Total Assistance, NT=Not Tested    BALANCE: Good Fair+ Fair Fair- Poor NT Comments   Sitting Static [] [x] [] [] [] []    Sitting Dynamic [] [] [x] [] [] []              Standing Static [] [] [] [] [x] []    Standing Dynamic [] [] [] [] [x] [] GAIT: I Mod I S SBA CGA Min Mod Max Total  NT x2 Comments:   Level of Assistance [] [] [] [] [] [] [] [] [] [x] [] Initiated stepping to chair to assist with transfer   Distance N/A    DME N/A    Gait Quality N/A    Weightbearing  Status N/A     I=Independent, Mod I=Modified Independent, S=Supervision, SBA=Standby Assistance, CGA=Contact Guard Assistance,   Min=Minimal Assistance, Mod=Moderate Assistance, Max=Maximal Assistance, Total=Total Assistance, NT=Not Tested    PLAN:   FREQUENCY/DURATION: PT Plan of Care: 3 times/week for duration of hospital stay or until stated goals are met, whichever comes first.  TREATMENT:     TREATMENT:   ($$ Neuromuscular Re-education: 23-37 mins    )  Neuromuscular Re-education (24 Minutes): Neuromuscular Re-education included Balance Training, Functional mobility with facilitation, Hemibody awareness training, Postural training, Sitting balance training and Standing balance training to improve Balance, Coordination, Functional Mobility, Postural Control and Proprioception. At this time, patient is appropriate for Co-treatment with occupational therapy due to patient's decreased overall endurance/tolerance levels, as well as need for high level skilled assistance to complete functional transfers/mobility and functional tasks. Judi Guzmán is appropriate for a multidisciplinary co-treatment of PT and OT to address goals of both disciplines.       TREATMENT GRID:  N/A    AFTER TREATMENT POSITION/PRECAUTIONS:  Alarm Activated, Chair, Needs within reach, RN notified and RN updated on patient status/progress    INTERDISCIPLINARY COLLABORATION:  RN/PCT, PT/PTA and OT/ALMEIDA    TOTAL TREATMENT DURATION:  PT Patient Time In/Time Out  Time In: 1110  Time Out: [address] [facility], [facility]

## 2021-10-15 NOTE — PROGRESS NOTES
LTG: Patient will tolerate least restrictive diet without overt signs or symptoms of airway compromise. STG: Patient will participate modified barium swallow study to objectively assess swallow function. MET 10/15  STG: Patient will tolerate pureed diet and mildly thick liquids(Nectar) via single straw sip with 1:1 assistance. Added 10/15  STG: Patient will participate in ongoing trials in efforts to advance diet. Added 10/15  SPEECH LANGUAGE PATHOLOGY: MODIFIED BARIUM SWALLOW STUDY  Initial Assessment    NAME/AGE/GENDER: [place] is a 78 y.o. female  DATE: 10/15/2021  PRIMARY DIAGNOSIS: Intracranial bleed (HCC) [I62.9]  Dementia ([facility] [F03.90]  Poorly-controlled hypertension [I10]  Hypothyroidism [E03.9]  Encephalopathy, unspecified [G93.40]  Procedure(s) (LRB):  ESOPHAGOGASTRODUODENOSCOPY (EGD) with NG tube placement Room 715 (N/A) 3 Days Post-Op  ICD-10: Treatment Diagnosis: Oropharyngeal dysphagia (R13.12)  INTERDISCIPLINARY COLLABORATION: Radiologist, Dr. Delmy Tate  PRECAUTIONS/ALLERGIES: Patient has no known allergies. RECOMMENDATIONS/PLAN   DIET:    Pureed   Mildly Thick Liquids (Nectar) via single straw sip    MEDICATIONS: Crushed in puree or applesauce     COMPENSATORY STRATEGIES/MODIFICATIONS INCLUDING:  · 1:1 assistance with all PO  · Small bites and sips  · Remain upright for 20-30 min after any PO  · Check mouth for pocketed PO  · Single sip via straw     OTHER RECOMMENDATIONS (including follow up treatment recommendations):   · Training in use of compensatory safe swallowing strategies/feeding guidelines  · Patient/family education  · Follow-up MBS as deemed necessary following therapy     RECOMMENDATIONS for CONTINUED SPEECH THERAPY:   YES: Anticipate need for ongoing speech therapy during this hospitalization and at next level of care. FREQUENCY/DURATION: Continue to follow patient 3 times a week for duration of hospital stay to address above goals.  Recommendations for next treatment session: Next treatment session will address diet tolerance and po trials       ASSESSMENT   Ms. Renay Chatman presents with moderate oropharyngeal dysphagia. Reduced oral containment with premature spillage to pyriforms resulting in shallow non clearing laryngeal penetration with thin liquids during the swallow and premature spillage into laryngeal vestibule with mildly thick liquids via cup that does not clear during the swallow. Only trace shallow pre-swallow penetration as bolus spilled to posterior laryngeal surface of epiglottis before the swallow on single occasion with mildly thick liquids via straw. Otherwise, tolerated mildly thick liquids via single straw sip and pudding without penetration or aspiration. Reduced epiglottic inversion and presence of dobhoff appeared to contribute to mild diffuse pharyngeal residue that improves with independent double swallow. Recommend puree diet and mildly thick liquids via single straw sip. 1:1 assistance. SUBJECTIVE   Alert, attempting to get mitts off entire time needing redirection. 4L nasal canula. History of Present Injury/Illness: Ms. Renay Chatman  has a past medical history of Endocrine disease, Hypertension, and Other ill-defined conditions(799.89). . She also  has no past surgical history on file. Pain:  Pain Intensity 1: 0  Pain Location 1: Back  Pain Orientation 1: Lower, Mid  Pain Intervention(s) 1: Emotional support, Position    Current dietary status prior to evaluation today:  NPO dobhoff present     Previous Modified Barium Swallow studies: n/a    Current Medications:   No current facility-administered medications on file prior to encounter. Current Outpatient Medications on File Prior to Encounter   Medication Sig Dispense Refill    calcium-vitamin D (OYSTER SHELL) 500 mg(1,250mg) -200 unit per tablet Take 1 Tab by mouth three (3) times daily (with meals).  90 Tab 0    traMADol (ULTRAM) 50 mg tablet Take 1 Tab by mouth every six (6) hours as needed for Pain. 60 Tab 0    amLODIPine (NORVASC) 5 mg tablet Take 5 mg by mouth daily.  levothyroxine (SYNTHROID) 112 mcg tablet Take 112 mcg by mouth Daily (before breakfast). OBJECTIVE   Orientation:    Person   hospital    Oral Assessment:  Labial: min left droop  Lingual: No impairment  Dentition: Edentulous  Oral Hygiene: Dry  Vocal Quality: Hoarse and Low volume    Modified barium swallow study was performed in the radiology suite using c-arm with Ms. Meliza Cazares in the lateral plane upright 90° in a stretcher. To evaluate her swallow function, barium coated liquid and food was administered in the form of thin liquids (by spoon and cup sip), mildly-thick liquids (by cup sip and straw sip) and pudding. Oral phase of swallow:    reduced posterior tongue elevation   premature spillage to pharynx pre-swallow    Mild oral residue clears with independent double swallow     Pharyngeal phase of swallow:    Swallows of thin liquids were delayed with premature spillage to pyriforms. There was non clearing shallow laryngeal penetration on 1/3 tsp trials and with thin via cup during the swallow. Mild diffuse pharyngeal residue. Independent double swallow/   Swallows of mildly-thick liquids were delayed. There was premature spillage into laryngeal vestibule resulting in shallow penetration that does not clear during the swallow. With straw, there premature spillage to pyriforms with no penetration or aspiration on 5 trials. On single occasion, there was premature spillage to posterior laryngeal surface of epiglottis resulting in pre swallow shallow penetration with trace amount that does not clear during the swallow. Epiglottic inversion improved with thicker viscosity. Mild diffuse pharyngeal residue around dobhoff.  Swallows of pudding were triggered at valleculae. No laryngeal penetration or aspiration was observed. Only mild valleculae residue.   Pharyngeal characteristics:   delayed pharyngeal swallow initiation   decreased retraction of base of tongue   adequate hyolaryngeal elevation/excursion   reduced epiglottic inversion   adequate constriction of posterior pharyngeal wall   incomplete laryngeal closure    Aspiration/Penetration Scale: 3 (Penetration/visible residue. Contrast remains above the folds/cords, but is not cleared.)    Cervical esophageal phase of swallow:    a limited view. Therefore, unable to rule out an esophageal component of dysphagia  **Distal esophagus not assessed due to limitations of MBS study. Assessment/Reassessment only, no treatment provided today. Tool Used: Dysphagia Outcome and Severity Scale (KEVIN)    Comments   Normal Diet 7 With no strategies or extra time needed   Functional Swallow 6 May have mild oral or pharyngeal delay   Mild Dysphagia 5 Which may require one diet consistency restricted    Mild-Moderate Dysphagia 4 With 1-2 diet consistencies restricted   Moderate Dysphagia 3 With 2 or more diet consistencies restricted   Moderately Severe Dysphagia 2 With partial PO strategies (trials with ST only)   Severe Dysphagia 1 With inability to tolerate any PO safely      Score:  Initial: 3 Most Recent: 4 (Date:10/15/2021)   Interpretation of Tool: The Dysphagia Outcome and Severity Scale (KEVIN) is a simple, easy-to-use, 7-point scale developed to systematically rate the functional severity of dysphagia based on objective assessment and make recommendations for diet level, independence level, and type of nutrition. Payor: Rigoberto Kessler / Plan: Kevin Mutters / Product Type: Managed Care Medicare /     Education:  · Recommendations discussed with patient  · MD notified via perfectserve. Safety:   After treatment position/precautions:  · Patient waiting in radiology holding bay for transport back to room.     Total Treatment Duration:  Time In: 0900   Time Out: [address] [facility], [facility], [facility] FANG REYES [facility]

## 2021-10-16 LAB
ANION GAP SERPL CALC-SCNC: 1 MMOL/L (ref 7–16)
BUN SERPL-MCNC: 34 MG/DL (ref 8–23)
CALCIUM SERPL-MCNC: 9.2 MG/DL (ref 8.3–10.4)
CHLORIDE SERPL-SCNC: 107 MMOL/L (ref 98–107)
CO2 SERPL-SCNC: 35 MMOL/L (ref 21–32)
CREAT SERPL-MCNC: 1.26 MG/DL (ref 0.6–1)
GLUCOSE SERPL-MCNC: 154 MG/DL (ref 65–100)
MM INDURATION POC: 0 MM (ref 0–5)
POTASSIUM SERPL-SCNC: 3.9 MMOL/L (ref 3.5–5.1)
PPD POC: NEGATIVE NEGATIVE
SODIUM SERPL-SCNC: 143 MMOL/L (ref 136–145)

## 2021-10-16 PROCEDURE — 77030018798 HC PMP KT ENTRL FED COVD -A

## 2021-10-16 PROCEDURE — 74011250637 HC RX REV CODE- 250/637: Performed by: INTERNAL MEDICINE

## 2021-10-16 PROCEDURE — 80048 BASIC METABOLIC PNL TOTAL CA: CPT

## 2021-10-16 PROCEDURE — 65270000029 HC RM PRIVATE

## 2021-10-16 PROCEDURE — 77030038269 HC DRN EXT URIN PURWCK BARD -A

## 2021-10-16 RX ADMIN — NYSTATIN 500000 UNITS: 100000 SUSPENSION ORAL at 11:52

## 2021-10-16 RX ADMIN — NYSTATIN 500000 UNITS: 100000 SUSPENSION ORAL at 21:12

## 2021-10-16 RX ADMIN — Medication 10 ML: at 13:20

## 2021-10-16 RX ADMIN — NYSTATIN: 100000 POWDER TOPICAL at 16:01

## 2021-10-16 RX ADMIN — AMLODIPINE BESYLATE 10 MG: 10 TABLET ORAL at 07:43

## 2021-10-16 RX ADMIN — ACETAMINOPHEN 650 MG: 325 TABLET ORAL at 21:12

## 2021-10-16 RX ADMIN — LEVOTHYROXINE SODIUM 112 MCG: 0.11 TABLET ORAL at 05:58

## 2021-10-16 RX ADMIN — Medication 10 ML: at 05:58

## 2021-10-16 RX ADMIN — NYSTATIN 500000 UNITS: 100000 SUSPENSION ORAL at 09:06

## 2021-10-16 RX ADMIN — Medication 10 ML: at 21:12

## 2021-10-16 RX ADMIN — NYSTATIN: 100000 POWDER TOPICAL at 07:45

## 2021-10-16 RX ADMIN — NYSTATIN 500000 UNITS: 100000 SUSPENSION ORAL at 16:00

## 2021-10-16 NOTE — PROGRESS NOTES
10/16/21 0647   NIH Stroke Scale   Interval Other (comment)   Level of Conciousness (1a) 0   LOC Questions (1b) (!) 1   LOC Commands (1c) 0   Best Gaze (2) 0   Visual (3) 0   Facial Palsy (4) (!) 1   Motor Arm, Left (5a) 0   Motor Arm, Right (5b) 0   Motor Leg, Left (6a) (!) 1   Motor Leg, Right (6b) (!) 1   Limb Ataxia (7) 0   Sensory (8) 0   Best Language (9) (!) 1   Dysarthria (10) (!) 1   Extinction and Inattention (11) 0   Total 6

## 2021-10-16 NOTE — PROGRESS NOTES
10/15/21 0582   NIH Stroke Scale   Interval Handoff/Transfer   Level of Conciousness (1a) 0   LOC Questions (1b) (!) 1   LOC Commands (1c) 0   Best Gaze (2) 0   Visual (3) 0   Facial Palsy (4) (!) 1   Motor Arm, Left (5a) 0   Motor Arm, Right (5b) 0   Motor Leg, Left (6a) (!) 1   Motor Leg, Right (6b) (!) 1   Limb Ataxia (7) 0   Sensory (8) 0   Best Language (9) (!) 1   Dysarthria (10) (!) 1   Extinction and Inattention (11) 0   Total 6

## 2021-10-16 NOTE — PROGRESS NOTES
Hospitalist Progress Note   Admit Date:  10/3/2021  2:16 AM   Name:  Inga Olmos   Age:  78 y.o. Sex:  female  :  1940   MRN:  [id number]   Room:  [facility]    Presenting Complaint: Headache    Reason(s) for Admission: Intracranial bleed ([facility] [address] [I62.9]  Dementia ([facility] [address] [F03.90]  Poorly-controlled hypertension [I10]  Hypothyroidism [E03.9]  Encephalopathy, unspecified [G93.40]     Hospital Course & Interval History:     Patient with past medical history of  Hypertension  Dementia  Hypothyroidism    Patient presented to ER on October 3 due to headache and confusion. CT head showed right temporal IPH. 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 Neurosurgery evaluated the patient and did not feel that the bleeding was related to aneurysm. Patient was admitted to ICU. On October 3, code as was called when she developed symptoms related to increased intracranial pressure. She was given mannitol, and Cardene. She developed intermittent delirium and agitation. She started on Depakote and Seroquel. She was encephalopathic for many days since admission. She started to be more alert, more cooperative with physical therapy recently. Subjective (10/16/21): 2021  Patient is lying quietly in bed. Not answering question. Not appearing to be in overt distress. No fever. 2021  Patient is lying in bed, no response to verbal stimuli. .   NG tube was successfully inserted. Patient is getting feeding through it. 2021  Reportedly patient seems more alert, more responsive verbally. She is more able to participate in physical therapy yesterday. At one point speech therapist signed her off because she was not participating in sessions enough. I have discussed with her daughter yesterday on the phone.   She indicated to me that if the patient required PEG tube placement, she would allow that. October 15, 2021  Patient is alert. Following commands. No fever. No shortness of breath    October 16, 2021  Patient was found to be able to have some oral intake from swallowing study. She is allowed puréed diet now. Afebrile. Assessment & Plan:     Principal Problem:    Hemorrhage of right temporal lobe ([facility] [address] (10/10/2021)  With several subsequent CT of the brain, showing stable hemorrhage. Avoid antiplatelet, anticoagulants. Continue physical therapy. CTA showed aneurysm but not in the area of her hemorrhage. Not a candidate for surgical intervention at this time. More alert. More cooperative with management. Answering questions appropriately. Following commands now. Improved encephalopathy. Able to swallow. Continue with puree diet. Continue physical therapy. Active Problems:    Hypothyroidism (6/29/2012)  On supplemental levothyroxine      Dementia ([facility] [address] (10/3/2021)        Poorly-controlled hypertension (10/3/2021)  On amlodipine, carvedilol, clonidine patch      LEODAN (acute kidney injury) ([facility] [address] (10/10/2021)  Improved creatinine. Monitor renal function and intake and output. Avoid nephrotoxic agents. Acute metabolic encephalopathy (97/55/0568)  From intracranial hemorrhage. Will see how her condition will involve further. Pleural effusion, bilateral (10/11/2021)  Small. Monitor. Chronic diastolic congestive heart failure ([facility] [address] (10/11/2021)  No decompensation. Monitor. PAF (paroxysmal atrial fibrillation) ([facility] [address] (10/11/2021)  Not a candidate for anticoagulation        Dispo/Discharge Planning:    Pending placement. Diet:  ADULT TUBE FEEDING Nasogastric; Standard with Fiber; Delivery Method: Continuous; Continuous Initial Rate (mL/hr): 10; Continuous Advance Tube Feeding: Yes; Advancement Volume (mL/hr): 10; Advancement Frequency: Other (Specify); Specify Other Adva. ..   ADULT DIET Dysphagia - Pureed; Mildly Thick ([place]); single straw sip; 1:1 assistance  ADULT ORAL NUTRITION SUPPLEMENT Lunch, Dinner; Frozen Supplement  DVT PPx: SCD   Code status: Full Code         Hospital Problems as of 10/16/2021 Never Reviewed        Codes Class Noted - Resolved POA    Pleural effusion, bilateral ICD-10-CM: J90  ICD-9-CM: 511.9  10/11/2021 - Present Yes        Chronic diastolic congestive heart failure (HCC) ICD-10-CM: I50.32  ICD-9-CM: 428.32, 428.0  10/11/2021 - Present Yes        PAF (paroxysmal atrial fibrillation) ([facility]) ICD-10-CM: I48.0  ICD-9-CM: 427.31  10/11/2021 - Present Yes        LEODAN (acute kidney injury) ([facility] ICD-10-CM: N17.9  ICD-9-CM: 584.9  10/10/2021 - Present No        Acute metabolic encephalopathy KKE-10-XT: G93.41  ICD-9-CM: 348.31  10/10/2021 - Present Yes        * (Principal) Hemorrhage of right temporal lobe [facility]) ICD-10-CM: I61.1  ICD-9-CM: 277  10/10/2021 - Present Yes        Dementia ([facility] ICD-10-CM: F03.90  ICD-9-CM: 294.20  10/3/2021 - Present Yes        Poorly-controlled hypertension ICD-10-CM: I10  ICD-9-CM: 401.9  10/3/2021 - Present Yes        Hypothyroidism ICD-10-CM: E03.9  ICD-9-CM: 244.9  6/29/2012 - Present Yes        RESOLVED: Acute pulmonary edema with congestive heart failure (HCC) ICD-10-CM: I50.1  ICD-9-CM: 428.1  10/11/2021 - 10/11/2021 Clinically Undetermined        RESOLVED: Atrial fibrillation with RVR (HCC) ICD-10-CM: I48.91  ICD-9-CM: 427.31  10/10/2021 - 10/11/2021 Clinically Undetermined        RESOLVED: UTI (urinary tract infection) ICD-10-CM: N39.0  ICD-9-CM: 599.0  7/2/2012 - 10/11/2021 Yes              Objective:     Patient Vitals for the past 24 hrs:   Temp Pulse Resp BP SpO2   10/16/21 0800 97.7 °F (36.5 °C) (!) 56 17 118/64 98 %   10/16/21 0400 97.6 °F (36.4 °C) (!) 57 18 (!) 140/66 97 %   10/16/21 0000 98 °F (36.7 °C) 62 20 124/66 97 %   10/15/21 2000 97.6 °F (36.4 °C) (!) 58 20 138/72 97 %   10/15/21 1600 97.3 °F (36.3 °C) (!) 59 18 95/63 97 %   10/15/21 1200 97.3 °F (36.3 °C) 62 20 (!) 153/85 97 %     Oxygen Therapy  O2 Sat (%): 98 % (10/16/21 0800)  Pulse via Oximetry: 78 beats per minute (10/12/21 1352)  O2 Device: None (Room air) (10/15/21 1920)  Skin Assessment: Other (see comment/note) (no breakdown; mucosa dry) (10/09/21 1900)  Skin Protection for O2 Device: N/A (10/03/21 2001)  O2 Flow Rate (L/min): 3 l/min (10/14/21 1920)  O2 Temperature: 35.6 °F (2 °C) (10/09/21 0705)    Estimated body mass index is 27.37 kg/m² as calculated from the following:    Height as of this encounter: 5' 5\" (1.651 m). Weight as of this encounter: 74.6 kg (164 lb 8 oz). Intake/Output Summary (Last 24 hours) at 10/16/2021 1152  Last data filed at 10/16/2021 0400  Gross per 24 hour   Intake 135 ml   Output 500 ml   Net -365 ml         Physical Exam:     Visit Vitals  /64 (BP 1 Location: Left arm, BP Patient Position: At rest)   Pulse (!) 56   Temp 97.7 °F (36.5 °C)   Resp 17   Ht 5' 5\" (1.651 m)   Wt 74.6 kg (164 lb 8 oz)   SpO2 98%   BMI 27.37 kg/m²        Blood pressure 118/64, pulse (!) 56, temperature 97.7 °F (36.5 °C), resp. rate 17, height 5' 5\" (1.651 m), weight 74.6 kg (164 lb 8 oz), SpO2 98 %. General:    Well-nourished.   lying in bed. Fully alert . Following commands well .  head:  Normocephalic, atraumatic, pale, no jaundice  Eyes:  Sclerae appear normal.  Pupils equally round. ENT:  Nares appear normal, no drainage. Moist oral mucosa  Neck:  No restricted ROM. Trachea midline   CV:   RRR. No m/r/g. No jugular venous distension. Lungs:   CTAB. No wheezing, rhonchi, or rales. Respirations even, unlabored  Abdomen: Bowel sounds present. Soft, nontender, nondistended. Extremities: No cyanosis or clubbing. No edema. Able to move all extremities. Skin:     No rashes and normal coloration. Warm and dry. Neuro:  CN II-XII grossly intact. moving all extremities following commands.        I have reviewed ordered lab tests and independently visualized imaging below:    Recent Labs:  Recent Results (from the past 48 hour(s))   METABOLIC PANEL, BASIC    Collection Time: 10/15/21  4:50 AM   Result Value Ref Range    Sodium 146 (H) 136 - 145 mmol/L    Potassium 3.6 3.5 - 5.1 mmol/L    Chloride 111 (H) 98 - 107 mmol/L    CO2 33 (H) 21 - 32 mmol/L    Anion gap 2 (L) 7 - 16 mmol/L    Glucose 127 (H) 65 - 100 mg/dL    BUN 35 (H) 8 - 23 MG/DL    Creatinine 1.16 (H) 0.6 - 1.0 MG/DL    GFR est AA 57 (L) >60 ml/min/1.73m2    GFR est non-AA 47 (L) >60 ml/min/1.73m2    Calcium 9.0 8.3 - 10.4 MG/DL   MAGNESIUM    Collection Time: 10/15/21  4:50 AM   Result Value Ref Range    Magnesium 2.0 1.8 - 2.4 mg/dL   PHOSPHORUS    Collection Time: 10/15/21  4:50 AM   Result Value Ref Range    Phosphorus 2.8 2.3 - 3.7 MG/DL   METABOLIC PANEL, BASIC    Collection Time: 10/16/21  6:21 AM   Result Value Ref Range    Sodium 143 136 - 145 mmol/L    Potassium 3.9 3.5 - 5.1 mmol/L    Chloride 107 98 - 107 mmol/L    CO2 35 (H) 21 - 32 mmol/L    Anion gap 1 (L) 7 - 16 mmol/L    Glucose 154 (H) 65 - 100 mg/dL    BUN 34 (H) 8 - 23 MG/DL    Creatinine 1.26 (H) 0.6 - 1.0 MG/DL    GFR est AA 52 (L) >60 ml/min/1.73m2    GFR est non-AA 43 (L) >60 ml/min/1.73m2    Calcium 9.2 8.3 - 10.4 MG/DL       All Micro Results     Procedure Component Value Units Date/Time    COVID-19 RAPID TEST [[id number]] Collected: 10/12/21 0964    Order Status: Completed Specimen: Nasopharyngeal Updated: 10/12/21 1014     Specimen source NASAL        COVID-19 rapid test Not detected        Comment:      The specimen is NEGATIVE for SARS-CoV-2, the novel coronavirus associated with COVID-19. A negative result does not rule out COVID-19. This test has been authorized by the FDA under an Emergency Use Authorization (EUA) for use by authorized laboratories.         Fact sheet for Healthcare Providers: kstattoo.com  Fact sheet for Patients: Contactually.com       Methodology: Isothermal Nucleic Acid Amplification         CULTURE, URINE [[id number]]  (Abnormal)  (Susceptibility) Collected: 10/04/21 2107    Order Status: Completed Specimen: Cath Urine Updated: 10/07/21 0749     Special Requests: NO SPECIAL REQUESTS        Culture result:       >100,000 COLONIES/mL KLEBSIELLA PNEUMONIAE                Other Studies:  No results found. Current Meds:  Current Facility-Administered Medications   Medication Dose Route Frequency    nystatin (MYCOSTATIN) 100,000 unit/gram powder   Topical BID    nystatin (MYCOSTATIN) 100,000 unit/mL oral suspension 500,000 Units  500,000 Units Oral QID    tuberculin injection 5 Units  5 Units IntraDERMal ONCE    NUTRITIONAL SUPPORT ELECTROLYTE PRN ORDERS   Does Not Apply PRN    levothyroxine (SYNTHROID) tablet 112 mcg  112 mcg Per G Tube ACB    amLODIPine (NORVASC) tablet 10 mg  10 mg Per G Tube DAILY    carvediloL (COREG) tablet 6.25 mg  6.25 mg Per G Tube BID WITH MEALS    cloNIDine (CATAPRES) 0.3 mg/24 hr patch 1 Patch  1 Patch TransDERmal Q7D    hydrALAZINE (APRESOLINE) 20 mg/mL injection 15 mg  15 mg IntraVENous Q4H PRN    central line flush (saline) syringe 10 mL  10 mL InterCATHeter Q8H    [Held by provider] furosemide (LASIX) injection 40 mg  40 mg IntraVENous BID    [Held by provider] QUEtiapine (SEROquel) tablet 12.5 mg  12.5 mg Oral QHS    acetaminophen (TYLENOL) tablet 650 mg  650 mg Oral Q4H PRN    [Held by provider] levETIRAcetam (KEPPRA) 500 mg in 0.9% sodium chloride (MBP/ADV) 100 mL MBP  500 mg IntraVENous Q12H    [Held by provider] divalproex (DEPAKOTE SPRINKLE) capsule 125 mg  125 mg Oral Q12H    LORazepam (ATIVAN) injection 1 mg  1 mg IntraVENous Q4H PRN    sodium chloride (NS) flush 5-40 mL  5-40 mL IntraVENous PRN    ondansetron (ZOFRAN) injection 8 mg  8 mg IntraVENous Q8H PRN       Signed:  Aleksandr Henderson MD    Part of this note may have been written by using a voice dictation software.   The note has been proof read but may still contain some grammatical/other typographical errors.

## 2021-10-16 NOTE — PROGRESS NOTES
Pt has been accepted for admission to [facility] pending insurance approval.  Facility has initiated the auth request.  CM does not anticipate a decision before Wednesday. CM spoke with pt's dtr, Michelle Mccormack, via phone to provide update.

## 2021-10-16 NOTE — PROGRESS NOTES
Problem: Patient Education: Go to Patient Education Activity  Goal: Patient/Family Education  Outcome: Progressing Towards Goal     Problem: Hemorrhagic Stroke: Day 5 through Discharge  Goal: Activity/Safety  Outcome: Progressing Towards Goal  Goal: Consults, if ordered  Outcome: Progressing Towards Goal  Goal: Diagnostic Test/Procedures  Outcome: Progressing Towards Goal  Goal: Nutrition/Diet  Outcome: Progressing Towards Goal  Goal: Medications  Outcome: Progressing Towards Goal  Goal: Respiratory  Outcome: Progressing Towards Goal  Goal: Treatments/Interventions/Procedures  Outcome: Progressing Towards Goal  Goal: Psychosocial  Outcome: Progressing Towards Goal  Goal: *Hemodynamically stable  Outcome: Progressing Towards Goal  Goal: *Verbalizes anxiety and depression are reduced or absent  Outcome: Progressing Towards Goal  Goal: *Absence of aspiration  Outcome: Progressing Towards Goal  Goal: *Absence of signs and symptoms of DVT  Outcome: Progressing Towards Goal  Goal: *Optimal pain control at patient's stated goal  Outcome: Progressing Towards Goal  Goal: *Tolerating diet  Outcome: Progressing Towards Goal  Goal: *Progressive mobility and function  Outcome: Progressing Towards Goal  Goal: *Rehabilitation readiness  Outcome: Progressing Towards Goal     Problem: Falls - Risk of  Goal: *Absence of Falls  Description: Document Claire Fall Risk and appropriate interventions in the flowsheet.   Outcome: Progressing Towards Goal  Note: Fall Risk Interventions:  Mobility Interventions: Bed/chair exit alarm    Mentation Interventions: Bed/chair exit alarm    Medication Interventions: Bed/chair exit alarm    Elimination Interventions: Call light in reach    History of Falls Interventions: Bed/chair exit alarm         Problem: Patient Education: Go to Patient Education Activity  Goal: Patient/Family Education  Outcome: Progressing Towards Goal     Problem: Patient Education: Go to Patient Education Activity  Goal: Patient/Family Education  Outcome: Progressing Towards Goal     Problem: Pressure Injury - Risk of  Goal: *Prevention of pressure injury  Description: Document Romeo Scale and appropriate interventions in the flowsheet.   Outcome: Progressing Towards Goal  Note: Pressure Injury Interventions:  Sensory Interventions: Assess changes in LOC, Minimize linen layers    Moisture Interventions: Check for incontinence Q2 hours and as needed, Limit adult briefs    Activity Interventions: Pressure redistribution bed/mattress(bed type)    Mobility Interventions: Pressure redistribution bed/mattress (bed type)    Nutrition Interventions: Document food/fluid/supplement intake    Friction and Shear Interventions: Foam dressings/transparent film/skin sealants                Problem: Patient Education: Go to Patient Education Activity  Goal: Patient/Family Education  Outcome: Progressing Towards Goal     Problem: Patient Education: Go to Patient Education Activity  Goal: Patient/Family Education  Outcome: Progressing Towards Goal     Problem: Delirium Treatment  Goal: *Level of consciousness restored to baseline  Outcome: Progressing Towards Goal  Goal: *Level of environmental perceptions restored to baseline  Outcome: Progressing Towards Goal  Goal: *Sensory perception restored to baseline  Outcome: Progressing Towards Goal  Goal: *Emotional stability restored to baseline  Outcome: Progressing Towards Goal  Goal: *Functional assessment restored to baseline  Outcome: Progressing Towards Goal  Goal: *Absence of falls  Outcome: Progressing Towards Goal  Goal: *Will remain free of delirium, CAM Score negative  Outcome: Progressing Towards Goal  Goal: *Cognitive status will be restored to baseline  Outcome: Progressing Towards Goal  Goal: Interventions  Outcome: Progressing Towards Goal     Problem: Patient Education: Go to Patient Education Activity  Goal: Patient/Family Education  Outcome: Progressing Towards Goal

## 2021-10-17 ENCOUNTER — APPOINTMENT (OUTPATIENT)
Dept: GENERAL RADIOLOGY | Age: 81
DRG: 064 | End: 2021-10-17
Attending: INTERNAL MEDICINE
Payer: MEDICARE

## 2021-10-17 LAB
ANION GAP SERPL CALC-SCNC: 0 MMOL/L (ref 7–16)
BUN SERPL-MCNC: 34 MG/DL (ref 8–23)
CALCIUM SERPL-MCNC: 9.1 MG/DL (ref 8.3–10.4)
CHLORIDE SERPL-SCNC: 103 MMOL/L (ref 98–107)
CO2 SERPL-SCNC: 36 MMOL/L (ref 21–32)
CREAT SERPL-MCNC: 1.18 MG/DL (ref 0.6–1)
GLUCOSE SERPL-MCNC: 129 MG/DL (ref 65–100)
MM INDURATION POC: 0 MM (ref 0–5)
POTASSIUM SERPL-SCNC: 4.3 MMOL/L (ref 3.5–5.1)
PPD POC: NEGATIVE NEGATIVE
SODIUM SERPL-SCNC: 139 MMOL/L (ref 136–145)

## 2021-10-17 PROCEDURE — 74011250637 HC RX REV CODE- 250/637: Performed by: INTERNAL MEDICINE

## 2021-10-17 PROCEDURE — 74011250636 HC RX REV CODE- 250/636: Performed by: FAMILY MEDICINE

## 2021-10-17 PROCEDURE — 65270000029 HC RM PRIVATE

## 2021-10-17 PROCEDURE — 74022 RADEX COMPL AQT ABD SERIES: CPT

## 2021-10-17 PROCEDURE — 74011250636 HC RX REV CODE- 250/636: Performed by: INTERNAL MEDICINE

## 2021-10-17 PROCEDURE — 80048 BASIC METABOLIC PNL TOTAL CA: CPT

## 2021-10-17 PROCEDURE — 77030040832 HC IRR TRAY MDII -A

## 2021-10-17 RX ORDER — ONDANSETRON 2 MG/ML
8 INJECTION INTRAMUSCULAR; INTRAVENOUS
Status: DISCONTINUED | OUTPATIENT
Start: 2021-10-17 | End: 2021-10-22 | Stop reason: HOSPADM

## 2021-10-17 RX ADMIN — ONDANSETRON 8 MG: 2 INJECTION INTRAMUSCULAR; INTRAVENOUS at 12:53

## 2021-10-17 RX ADMIN — NYSTATIN 500000 UNITS: 100000 SUSPENSION ORAL at 22:18

## 2021-10-17 RX ADMIN — NYSTATIN 500000 UNITS: 100000 SUSPENSION ORAL at 12:56

## 2021-10-17 RX ADMIN — NYSTATIN: 100000 POWDER TOPICAL at 17:52

## 2021-10-17 RX ADMIN — CARVEDILOL 6.25 MG: 6.25 TABLET, FILM COATED ORAL at 17:51

## 2021-10-17 RX ADMIN — ONDANSETRON 8 MG: 2 INJECTION INTRAMUSCULAR; INTRAVENOUS at 06:05

## 2021-10-17 RX ADMIN — NYSTATIN 500000 UNITS: 100000 SUSPENSION ORAL at 09:16

## 2021-10-17 RX ADMIN — Medication 10 ML: at 06:07

## 2021-10-17 RX ADMIN — NYSTATIN 500000 UNITS: 100000 SUSPENSION ORAL at 17:51

## 2021-10-17 RX ADMIN — NYSTATIN: 100000 POWDER TOPICAL at 09:16

## 2021-10-17 RX ADMIN — Medication 10 ML: at 13:18

## 2021-10-17 RX ADMIN — CARVEDILOL 6.25 MG: 6.25 TABLET, FILM COATED ORAL at 09:16

## 2021-10-17 RX ADMIN — Medication 10 ML: at 22:18

## 2021-10-17 RX ADMIN — LEVOTHYROXINE SODIUM 112 MCG: 0.11 TABLET ORAL at 06:07

## 2021-10-17 RX ADMIN — AMLODIPINE BESYLATE 10 MG: 10 TABLET ORAL at 09:16

## 2021-10-17 NOTE — PROGRESS NOTES
Problem: Patient Education: Go to Patient Education Activity  Goal: Patient/Family Education  Outcome: Progressing Towards Goal     Problem: Hemorrhagic Stroke: Day 5 through Discharge  Goal: Activity/Safety  Outcome: Progressing Towards Goal  Goal: Consults, if ordered  Outcome: Progressing Towards Goal  Goal: Diagnostic Test/Procedures  Outcome: Progressing Towards Goal  Goal: Nutrition/Diet  Outcome: Progressing Towards Goal  Goal: Medications  Outcome: Progressing Towards Goal  Goal: Respiratory  Outcome: Progressing Towards Goal  Goal: Treatments/Interventions/Procedures  Outcome: Progressing Towards Goal  Goal: Psychosocial  Outcome: Progressing Towards Goal  Goal: *Hemodynamically stable  Outcome: Progressing Towards Goal  Goal: *Verbalizes anxiety and depression are reduced or absent  Outcome: Progressing Towards Goal  Goal: *Absence of aspiration  Outcome: Progressing Towards Goal  Goal: *Absence of signs and symptoms of DVT  Outcome: Progressing Towards Goal  Goal: *Optimal pain control at patient's stated goal  Outcome: Progressing Towards Goal  Goal: *Tolerating diet  Outcome: Progressing Towards Goal  Goal: *Progressive mobility and function  Outcome: Progressing Towards Goal  Goal: *Rehabilitation readiness  Outcome: Progressing Towards Goal     Problem: Delirium Treatment  Goal: *Level of consciousness restored to baseline  Outcome: Progressing Towards Goal  Goal: *Level of environmental perceptions restored to baseline  Outcome: Progressing Towards Goal  Goal: *Sensory perception restored to baseline  Outcome: Progressing Towards Goal  Goal: *Emotional stability restored to baseline  Outcome: Progressing Towards Goal  Goal: *Functional assessment restored to baseline  Outcome: Progressing Towards Goal  Goal: *Absence of falls  Outcome: Progressing Towards Goal  Goal: *Will remain free of delirium, CAM Score negative  Outcome: Progressing Towards Goal  Goal: *Cognitive status will be restored to baseline  Outcome: Progressing Towards Goal  Goal: Interventions  Outcome: Progressing Towards Goal     Problem: Patient Education: Go to Patient Education Activity  Goal: Patient/Family Education  Outcome: Progressing Towards Goal

## 2021-10-17 NOTE — PROGRESS NOTES
Feedings were stopped by night RN before this RNs shift d/t pts nausea. Did not restart as pt c/o nausea and constipation with hypoactive BS. ABD XR obtained for concern of SBO, XR shows NGT coiled. NGT was originally placed by GI lab, will leave in place and continue hold feedings until tomorrow to reassess pt. Provider notified.

## 2021-10-17 NOTE — PROGRESS NOTES
10/17/21 0657   NIH Stroke Scale   Interval Other (comment)   Level of Conciousness (1a) 0   LOC Questions (1b) 0   LOC Commands (1c) 0   Best Gaze (2) 0   Visual (3) 0   Facial Palsy (4) (!) 1   Motor Arm, Left (5a) 0   Motor Arm, Right (5b) 0   Motor Leg, Left (6a) (!) 1   Motor Leg, Right (6b) (!) 1   Limb Ataxia (7) 0   Sensory (8) 0   Best Language (9) 0   Dysarthria (10) 0   Extinction and Inattention (11) 0   Total 3

## 2021-10-17 NOTE — PROGRESS NOTES
10/17/21 1900   NIH Stroke Scale   Interval Other (comment)   Level of Conciousness (1a) 0   LOC Questions (1b) (!) 2   LOC Commands (1c) 0   Best Gaze (2) 0   Visual (3) 0   Facial Palsy (4) 0   Motor Arm, Left (5a) 0   Motor Arm, Right (5b) 0   Motor Leg, Left (6a) (!) 2   Motor Leg, Right (6b) (!) 2   Limb Ataxia (7) 0   Sensory (8) 0   Best Language (9) 0   Dysarthria (10) 0   Extinction and Inattention (11) 0   Total 6

## 2021-10-17 NOTE — PROGRESS NOTES
Hospitalist Progress Note   Admit Date:  10/3/2021  2:16 AM   Name:  Aliza Sheikh   Age:  78 y.o. Sex:  female  :  1940   MRN:  [id number]   Room:  [facility]    Presenting Complaint: Headache    Reason(s) for Admission: Intracranial bleed ([facility] [I62.9]  Dementia ([facility] [F03.90]  Poorly-controlled hypertension [I10]  Hypothyroidism [E03.9]  Encephalopathy, unspecified [G93.40]     Hospital Course & Interval History:     Patient with past medical history of  Hypertension  Dementia  Hypothyroidism    Patient presented to ER on October 3 due to headache and confusion. CT head showed right temporal IPH. 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 Neurosurgery evaluated the patient and did not feel that the bleeding was related to aneurysm. Patient was admitted to ICU. On October 3, code as was called when she developed symptoms related to increased intracranial pressure. She was given mannitol, and Cardene. She developed intermittent delirium and agitation. She started on Depakote and Seroquel. She was encephalopathic for many days since admission. She started to be more alert, more cooperative with physical therapy recently. Subjective (10/17/21): 2021  Patient is lying quietly in bed. Not answering question. Not appearing to be in overt distress. No fever. 2021  Patient is lying in bed, no response to verbal stimuli. .   NG tube was successfully inserted. Patient is getting feeding through it. 2021  Reportedly patient seems more alert, more responsive verbally. She is more able to participate in physical therapy yesterday. At one point speech therapist signed her off because she was not participating in sessions enough. I have discussed with her daughter yesterday on the phone.   She indicated to me that if the patient required PEG tube placement, she would allow that. October 15, 2021  Patient is alert. Following commands. No fever. No shortness of breath    October 16, 2021  Patient was found to be able to have some oral intake from swallowing study. She is allowed puréed diet now. Afebrile. October 17, 2021  Patient is alert, oriented. Talking well. Answering questions well. Eating better. Assessment & Plan:     Principal Problem:    Hemorrhage of right temporal lobe ([facility] (10/10/2021)  With several subsequent CT of the brain, showing stable hemorrhage. Avoid antiplatelet, anticoagulants. Continue physical therapy. CTA showed aneurysm but not in the area of her hemorrhage. Not a candidate for surgical intervention. Patient is well alert now. Well oriented. Answering questions appropriately. Following commands now. No encephalopathy. Able to swallow. Continue with puree diet. Continue physical therapy. Active Problems:    Hypothyroidism (6/29/2012)  On supplemental levothyroxine      Dementia ([facility] (10/3/2021)        Poorly-controlled hypertension (10/3/2021)  On amlodipine, carvedilol, clonidine patch      LEODAN (acute kidney injury) ([facility] (10/10/2021)  Improved creatinine. Monitor renal function and intake and output. Avoid nephrotoxic agents. Renal function continues to improve. Acute metabolic encephalopathy (58/50/3072)  From intracranial hemorrhage. Resolved. Pleural effusion, bilateral (10/11/2021)  Small. Monitor. Chronic diastolic congestive heart failure ([facility] (10/11/2021)  No decompensation. Monitor. PAF (paroxysmal atrial fibrillation) ([facility] (10/11/2021)  Not a candidate for anticoagulation        Dispo/Discharge Planning:    Pending insurance approval for rehab facility.      Diet:  ADULT TUBE FEEDING Nasogastric; Standard with Fiber; Delivery Method: Continuous; Continuous Initial Rate (mL/hr): 10; Continuous Advance Tube Feeding: Yes; Advancement Volume (mL/hr): 10; Advancement Frequency: Other (Specify); Specify Other Adva. ..   ADULT DIET Dysphagia - Pureed; Mildly Thick (Nectar); single straw sip; 1:1 assistance  ADULT ORAL NUTRITION SUPPLEMENT Lunch, Dinner; Frozen Supplement  DVT PPx: SCD   Code status: Full Code         Hospital Problems as of 10/17/2021 Never Reviewed        Codes Class Noted - Resolved POA    Pleural effusion, bilateral ICD-10-CM: J90  ICD-9-CM: 511.9  10/11/2021 - Present Yes        Chronic diastolic congestive heart failure (HCC) ICD-10-CM: I50.32  ICD-9-CM: 428.32, 428.0  10/11/2021 - Present Yes        PAF (paroxysmal atrial fibrillation) (HCC) ICD-10-CM: I48.0  ICD-9-CM: 427.31  10/11/2021 - Present Yes        LEODAN (acute kidney injury) ([facility] ICD-10-CM: N17.9  ICD-9-CM: 584.9  10/10/2021 - Present No        Acute metabolic encephalopathy [address]: G93.41  ICD-9-CM: 348.31  10/10/2021 - Present Yes        * (Principal) Hemorrhage of right temporal lobe [facility]) ICD-10-CM: I61.1  ICD-9-CM: 995  10/10/2021 - Present Yes        Dementia ([facility] ICD-10-CM: F03.90  ICD-9-CM: 294.20  10/3/2021 - Present Yes        Poorly-controlled hypertension ICD-10-CM: I10  ICD-9-CM: 401.9  10/3/2021 - Present Yes        Hypothyroidism ICD-10-CM: E03.9  ICD-9-CM: 244.9  6/29/2012 - Present Yes        RESOLVED: Acute pulmonary edema with congestive heart failure ([facility] ICD-10-CM: I50.1  ICD-9-CM: 428.1  10/11/2021 - 10/11/2021 Clinically Undetermined        RESOLVED: Atrial fibrillation with RVR ([facility] ICD-10-CM: I48.91  ICD-9-CM: 427.31  10/10/2021 - 10/11/2021 Clinically Undetermined        RESOLVED: UTI (urinary tract infection) ICD-10-CM: N39.0  ICD-9-CM: 599.0  7/2/2012 - 10/11/2021 Yes              Objective:     Patient Vitals for the past 24 hrs:   Temp Pulse Resp BP SpO2   10/17/21 0704 97.8 °F (36.6 °C) (!) 56 18 127/70 97 %   10/16/21 2349 98.4 °F (36.9 °C) 64 18 (!) 142/61 92 %   10/16/21 1937 98 °F (36.7 °C) 61 18 (!) 143/71 93 %   10/16/21 1600 98 °F (36.7 °C) 60 16 127/63 98 %   10/16/21 1550  (!) 58      10/16/21 1200 98.7 °F (37.1 °C) 62 17 138/68 98 %     Oxygen Therapy  O2 Sat (%): 97 % (10/17/21 0704)  Pulse via Oximetry: 78 beats per minute (10/12/21 1352)  O2 Device: None (Room air) (10/16/21 1950)  Skin Assessment: Other (see comment/note) (no breakdown; mucosa dry) (10/09/21 1900)  Skin Protection for O2 Device: N/A (10/03/21 2001)  O2 Flow Rate (L/min): 3 l/min (10/14/21 1920)  O2 Temperature: 35.6 °F (2 °C) (10/09/21 0705)    Estimated body mass index is 29.92 kg/m² as calculated from the following:    Height as of this encounter: 5' 5\" (1.651 m). Weight as of this encounter: 81.6 kg (179 lb 12.8 oz). Intake/Output Summary (Last 24 hours) at 10/17/2021 1021  Last data filed at 10/17/2021 0349  Gross per 24 hour   Intake 300 ml   Output    Net 300 ml         Physical Exam:     Visit Vitals  /70 (BP 1 Location: Left upper arm, BP Patient Position: Semi fowlers)   Pulse (!) 56   Temp 97.8 °F (36.6 °C)   Resp 18   Ht 5' 5\" (1.651 m)   Wt 81.6 kg (179 lb 12.8 oz)   SpO2 97%   BMI 29.92 kg/m²        Blood pressure 127/70, pulse (!) 56, temperature 97.8 °F (36.6 °C), resp. rate 18, height 5' 5\" (1.651 m), weight 81.6 kg (179 lb 12.8 oz), SpO2 97 %. General:    Well-nourished.   lying in bed. Fully alert . Following commands well .  head:  Normocephalic, atraumatic, pale, no jaundice  Eyes:  Sclerae appear normal.  Pupils equally round. ENT:  Nares appear normal, no drainage. Moist oral mucosa  Neck:  No restricted ROM. Trachea midline   CV:   RRR. No m/r/g. No jugular venous distension. Lungs:   CTAB. No wheezing, rhonchi, or rales. Respirations even, unlabored  Abdomen: Bowel sounds present. Soft, nontender, nondistended. Extremities: No cyanosis or clubbing. No edema. Able to move all extremities. Skin:     No rashes and normal coloration. Warm and dry. Neuro:  CN II-XII grossly intact. moving all extremities following commands. I have reviewed ordered lab tests and independently visualized imaging below:    Recent Labs:  Recent Results (from the past 48 hour(s))   METABOLIC PANEL, BASIC    Collection Time: 10/16/21  6:21 AM   Result Value Ref Range    Sodium 143 136 - 145 mmol/L    Potassium 3.9 3.5 - 5.1 mmol/L    Chloride 107 98 - 107 mmol/L    CO2 35 (H) 21 - 32 mmol/L    Anion gap 1 (L) 7 - 16 mmol/L    Glucose 154 (H) 65 - 100 mg/dL    BUN 34 (H) 8 - 23 MG/DL    Creatinine 1.26 (H) 0.6 - 1.0 MG/DL    GFR est AA 52 (L) >60 ml/min/1.73m2    GFR est non-AA 43 (L) >60 ml/min/1.73m2    Calcium 9.2 8.3 - 10.4 MG/DL   PLEASE READ & DOCUMENT PPD TEST IN 24 HRS    Collection Time: 10/16/21  4:21 PM   Result Value Ref Range    PPD Negative Negative    mm Induration 0 0 - 5 mm   METABOLIC PANEL, BASIC    Collection Time: 10/17/21  6:02 AM   Result Value Ref Range    Sodium 139 136 - 145 mmol/L    Potassium 4.3 3.5 - 5.1 mmol/L    Chloride 103 98 - 107 mmol/L    CO2 36 (H) 21 - 32 mmol/L    Anion gap 0 (L) 7 - 16 mmol/L    Glucose 129 (H) 65 - 100 mg/dL    BUN 34 (H) 8 - 23 MG/DL    Creatinine 1.18 (H) 0.6 - 1.0 MG/DL    GFR est AA 56 (L) >60 ml/min/1.73m2    GFR est non-AA 46 (L) >60 ml/min/1.73m2    Calcium 9.1 8.3 - 10.4 MG/DL       All Micro Results     Procedure Component Value Units Date/Time    COVID-19 RAPID TEST [[id number]] Collected: 10/12/21 0914    Order Status: Completed Specimen: Nasopharyngeal Updated: 10/12/21 1014     Specimen source NASAL        COVID-19 rapid test Not detected        Comment:      The specimen is NEGATIVE for SARS-CoV-2, the novel coronavirus associated with COVID-19. A negative result does not rule out COVID-19. This test has been authorized by the FDA under an Emergency Use Authorization (EUA) for use by authorized laboratories.         Fact sheet for Healthcare Providers: ConventionUpdate.co.nz  Fact sheet for Patients: ConventionUpdate.co.nz Methodology: Isothermal Nucleic Acid Amplification         CULTURE, URINE [[id number]]  (Abnormal)  (Susceptibility) Collected: 10/04/21 2107    Order Status: Completed Specimen: Cath Urine Updated: 10/07/21 0749     Special Requests: NO SPECIAL REQUESTS        Culture result:       >100,000 COLONIES/mL KLEBSIELLA PNEUMONIAE                Other Studies:  No results found. Current Meds:  Current Facility-Administered Medications   Medication Dose Route Frequency    nystatin (MYCOSTATIN) 100,000 unit/gram powder   Topical BID    nystatin (MYCOSTATIN) 100,000 unit/mL oral suspension 500,000 Units  500,000 Units Oral QID    NUTRITIONAL SUPPORT ELECTROLYTE PRN ORDERS   Does Not Apply PRN    levothyroxine (SYNTHROID) tablet 112 mcg  112 mcg Per G Tube ACB    amLODIPine (NORVASC) tablet 10 mg  10 mg Per G Tube DAILY    carvediloL (COREG) tablet 6.25 mg  6.25 mg Per G Tube BID WITH MEALS    cloNIDine (CATAPRES) 0.3 mg/24 hr patch 1 Patch  1 Patch TransDERmal Q7D    hydrALAZINE (APRESOLINE) 20 mg/mL injection 15 mg  15 mg IntraVENous Q4H PRN    central line flush (saline) syringe 10 mL  10 mL InterCATHeter Q8H    [Held by provider] furosemide (LASIX) injection 40 mg  40 mg IntraVENous BID    [Held by provider] QUEtiapine (SEROquel) tablet 12.5 mg  12.5 mg Oral QHS    acetaminophen (TYLENOL) tablet 650 mg  650 mg Oral Q4H PRN    [Held by provider] levETIRAcetam (KEPPRA) 500 mg in 0.9% sodium chloride (MBP/ADV) 100 mL MBP  500 mg IntraVENous Q12H    [Held by provider] divalproex (DEPAKOTE SPRINKLE) capsule 125 mg  125 mg Oral Q12H    LORazepam (ATIVAN) injection 1 mg  1 mg IntraVENous Q4H PRN    sodium chloride (NS) flush 5-40 mL  5-40 mL IntraVENous PRN    ondansetron (ZOFRAN) injection 8 mg  8 mg IntraVENous Q8H PRN       Signed:  Da Stone MD    Part of this note may have been written by using a voice dictation software.   The note has been proof read but may still contain some grammatical/other typographical errors.

## 2021-10-18 LAB
ANION GAP SERPL CALC-SCNC: 3 MMOL/L (ref 7–16)
BUN SERPL-MCNC: 28 MG/DL (ref 8–23)
CALCIUM SERPL-MCNC: 9 MG/DL (ref 8.3–10.4)
CHLORIDE SERPL-SCNC: 102 MMOL/L (ref 98–107)
CO2 SERPL-SCNC: 32 MMOL/L (ref 21–32)
CREAT SERPL-MCNC: 1.25 MG/DL (ref 0.6–1)
GLUCOSE SERPL-MCNC: 96 MG/DL (ref 65–100)
MAGNESIUM SERPL-MCNC: 2 MG/DL (ref 1.8–2.4)
PHOSPHATE SERPL-MCNC: 3.1 MG/DL (ref 2.3–3.7)
POTASSIUM SERPL-SCNC: 4.7 MMOL/L (ref 3.5–5.1)
SODIUM SERPL-SCNC: 137 MMOL/L (ref 136–145)

## 2021-10-18 PROCEDURE — 74011250637 HC RX REV CODE- 250/637: Performed by: INTERNAL MEDICINE

## 2021-10-18 PROCEDURE — 84100 ASSAY OF PHOSPHORUS: CPT

## 2021-10-18 PROCEDURE — 97530 THERAPEUTIC ACTIVITIES: CPT

## 2021-10-18 PROCEDURE — 83735 ASSAY OF MAGNESIUM: CPT

## 2021-10-18 PROCEDURE — 65270000029 HC RM PRIVATE

## 2021-10-18 PROCEDURE — 92526 ORAL FUNCTION THERAPY: CPT

## 2021-10-18 PROCEDURE — 97112 NEUROMUSCULAR REEDUCATION: CPT

## 2021-10-18 PROCEDURE — 80048 BASIC METABOLIC PNL TOTAL CA: CPT

## 2021-10-18 PROCEDURE — 74011250636 HC RX REV CODE- 250/636: Performed by: INTERNAL MEDICINE

## 2021-10-18 PROCEDURE — 36592 COLLECT BLOOD FROM PICC: CPT

## 2021-10-18 PROCEDURE — 74011000250 HC RX REV CODE- 250: Performed by: INTERNAL MEDICINE

## 2021-10-18 PROCEDURE — 2709999900 HC NON-CHARGEABLE SUPPLY

## 2021-10-18 RX ORDER — MECLIZINE HCL 12.5 MG 12.5 MG/1
12.5 TABLET ORAL 2 TIMES DAILY
Status: DISCONTINUED | OUTPATIENT
Start: 2021-10-18 | End: 2021-10-18

## 2021-10-18 RX ORDER — TRIAMCINOLONE ACETONIDE 1 MG/G
1 PASTE DENTAL 2 TIMES DAILY
Status: DISCONTINUED | OUTPATIENT
Start: 2021-10-18 | End: 2021-10-18 | Stop reason: ALTCHOICE

## 2021-10-18 RX ORDER — ESCITALOPRAM OXALATE 5 MG/5ML
10 SOLUTION ORAL DAILY
Status: DISCONTINUED | OUTPATIENT
Start: 2021-10-19 | End: 2021-10-22 | Stop reason: HOSPADM

## 2021-10-18 RX ORDER — MECLIZINE HCL 12.5 MG 12.5 MG/1
25 TABLET ORAL
Status: DISCONTINUED | OUTPATIENT
Start: 2021-10-18 | End: 2021-10-22 | Stop reason: HOSPADM

## 2021-10-18 RX ORDER — LEVOTHYROXINE SODIUM 75 UG/1
150 TABLET ORAL
Status: DISCONTINUED | OUTPATIENT
Start: 2021-10-19 | End: 2021-10-22 | Stop reason: HOSPADM

## 2021-10-18 RX ORDER — MECLIZINE HCL 12.5 MG 12.5 MG/1
25 TABLET ORAL
Status: DISCONTINUED | OUTPATIENT
Start: 2021-10-18 | End: 2021-10-18

## 2021-10-18 RX ORDER — AMOXICILLIN 250 MG
2 CAPSULE ORAL DAILY
Status: DISCONTINUED | OUTPATIENT
Start: 2021-10-18 | End: 2021-10-22 | Stop reason: HOSPADM

## 2021-10-18 RX ORDER — LEVOTHYROXINE SODIUM 150 UG/1
150 TABLET ORAL
COMMUNITY

## 2021-10-18 RX ORDER — ESCITALOPRAM OXALATE 10 MG/1
10 TABLET ORAL DAILY
COMMUNITY

## 2021-10-18 RX ORDER — POLYETHYLENE GLYCOL 3350 17 G/17G
17 POWDER, FOR SOLUTION ORAL DAILY
Status: DISCONTINUED | OUTPATIENT
Start: 2021-10-18 | End: 2021-10-22

## 2021-10-18 RX ORDER — MECLIZINE HYDROCHLORIDE 25 MG/1
25 TABLET ORAL
COMMUNITY

## 2021-10-18 RX ADMIN — LEVOTHYROXINE SODIUM 112 MCG: 0.11 TABLET ORAL at 05:27

## 2021-10-18 RX ADMIN — Medication 10 ML: at 14:00

## 2021-10-18 RX ADMIN — Medication 10 ML: at 21:16

## 2021-10-18 RX ADMIN — NYSTATIN: 100000 POWDER TOPICAL at 17:18

## 2021-10-18 RX ADMIN — NYSTATIN 10 ML: 100000 SUSPENSION ORAL at 21:16

## 2021-10-18 RX ADMIN — POLYETHYLENE GLYCOL 3350 17 G: 17 POWDER, FOR SOLUTION ORAL at 15:42

## 2021-10-18 RX ADMIN — Medication 10 ML: at 05:27

## 2021-10-18 RX ADMIN — ONDANSETRON 8 MG: 2 INJECTION INTRAMUSCULAR; INTRAVENOUS at 12:23

## 2021-10-18 RX ADMIN — NYSTATIN: 100000 POWDER TOPICAL at 09:46

## 2021-10-18 RX ADMIN — AMLODIPINE BESYLATE 10 MG: 10 TABLET ORAL at 09:43

## 2021-10-18 RX ADMIN — SENNOSIDES AND DOCUSATE SODIUM 2 TABLET: 8.6; 5 TABLET ORAL at 15:42

## 2021-10-18 RX ADMIN — NYSTATIN 500000 UNITS: 100000 SUSPENSION ORAL at 09:42

## 2021-10-18 NOTE — PROGRESS NOTES
Due to COVID-19 ACTION PLAN, the patient's office visit was converted to a video visit with patient consent    Patient last seen: New patient   Date of Visit: 20  Time of Visit: 8:00    Patient ID: Ruth is a 16 year old female.    Patient's medications, allergies, past medical, surgical, social and family histories were reviewed and updated as appropriate.    Chief Complaint   Patient presents with   • Menstrual Problem       HPI: Pt presents today a new patient to establish care and discuss her menstrual abnormality. Menarche age 8 and since that time patient reports she has experienced severe pain with her menses. Sometimes pain was so severe she'd also have n/v. Patient started on Monessa at age 14 and her symptoms did not improve and bleeding became more irregular. She was subsequently switched to Lo Loestrin which she's been taking for ~1 year. Her bleeding is lighter and she state's she experiences menses q60-90d. Patient has noticed that she experiences pelvic pain outside of her menses. She does get relief with use of Aleve during that time.       OB History    Para Term  AB Living   0 0 0 0 0 0   SAB TAB Ectopic Molar Multiple Live Births   0 0 0 0 0 0       GYN Hx:  Patient's last menstrual period was 2020.  Sexual activity: yes-with women   Contraception: OCPs    STI: None    Menstrual history: menarche at age 8, irregular and vary from 60 to 90 days and lasts 2-4 days    Review of Systems   Constitutional: Negative.    Respiratory: Negative.    Cardiovascular: Negative.    Gastrointestinal: Negative.    Endocrine: Negative.    Genitourinary:        See HPI    Neurological: Negative.    Hematological: Negative.    Psychiatric/Behavioral: Negative.        Past Medical History:   Diagnosis Date   • Asthma     age of 6   • Mitral valve prolapse      Past Surgical History:   Procedure Laterality Date   • Adenoidectomy     • Tonsillectomy       Family History   Problem Relation  Hospitalist Progress Note   Admit Date:  10/3/2021  2:16 AM   Name:  Karissa Brochluke   Age:  78 y.o. Sex:  female  :  1940   MRN:  [id number]   Room:  [facility]    Presenting Complaint: Headache    Reason(s) for Admission: Intracranial bleed ([facility] [I62.9]  Dementia ([facility] [facility] [F03.90]  Poorly-controlled hypertension [I10]  Hypothyroidism [E03.9]  Encephalopathy, unspecified [G93.40]     Hospital Course & Interval History:     Patient with past medical history of  Hypertension  Dementia  Hypothyroidism    Patient presented to ER on October 3 due to headache and confusion. CT head showed right temporal IPH. 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 Neurosurgery evaluated the patient and did not feel that the bleeding was related to aneurysm. Patient was admitted to ICU. On October 3, code as was called when she developed symptoms related to increased intracranial pressure. She was given mannitol, and Cardene. She developed intermittent delirium and agitation. She started on Depakote and Seroquel. She was encephalopathic for many days since admission. She started to be more alert, more cooperative with physical therapy recently. Subjective (10/18/21): 2021  Patient is lying quietly in bed. Not answering question. Not appearing to be in overt distress. No fever. 2021  Patient is lying in bed, no response to verbal stimuli. .   NG tube was successfully inserted. Patient is getting feeding through it. 2021  Reportedly patient seems more alert, more responsive verbally. She is more able to participate in physical therapy yesterday. At one point speech therapist signed her off because she was not participating in sessions enough. I have discussed with her daughter yesterday on the phone.   She indicated to me that if the patient required PEG tube placement, she would Age of Onset   • Diabetes Maternal Grandmother 66        type 2   • Osteoporosis Maternal Grandmother 77   • Cancer Maternal Grandfather 85        Anal     Social History     Tobacco Use   Smoking Status Never Smoker   Smokeless Tobacco Never Used     Current Outpatient Medications   Medication Sig Dispense Refill   • norethindrone-eth estradiol & eth estradiol & ferrous fum (LO LOESTRIN FE) 1 MG-10 MCG / 10 MCG tablet  28 tablet    • QUEtiapine (SEROQUEL) 100 MG tablet Take 1 tablet by mouth 2 times daily.     • montelukast (SINGULAIR) 10 MG tablet Take 1 tablet by mouth nightly. 30 tablet 11   • albuterol (PROAIR RESPICLICK) 108 (90 Base) MCG/ACT inhaler Inhale 2 puffs into the lungs every 6 hours as needed for Shortness of Breath or Wheezing. 1 Inhaler 3   • VRAYLAR 3 MG capsule TK 1 C PO D  2   • lamoTRIgine (LAMICTAL) 100 MG tablet TK 1 T PO BID  2   • VYVANSE 70 MG capsule TK 1 C PO QAM  0   • loratadine (CLARITIN) 10 MG tablet Take 1 tablet by mouth daily. 90 tablet 0   • Norethin-Eth Estrad-Fe Biphas (LO LOESTRIN FE PO) Take 1 tablet by mouth daily.       No current facility-administered medications for this visit.        Visit Vitals  LMP 02/27/2020   Breastfeeding No     Physical Exam  Constitutional:       Appearance: Normal appearance.   Cardiovascular:      Comments: Examination of peripheral vascular system by observation appears normal.     Pulmonary:      Effort: Pulmonary effort is normal.   Neurological:      General: No focal deficit present.      Mental Status: She is alert.   Skin:     Coloration: Skin is not jaundiced.      Findings: No bruising.   Psychiatric:         Mood and Affect: Mood normal.   Vitals signs reviewed.          Assessment/Plan:  Problem List Items Addressed This Visit     None      Visit Diagnoses     Dysmenorrhea    -  Primary    Pelvic pain in female             d/w patient potential causes of her severe dysmenorrhea and pelvic pain. Reviewed with patient possibility of  allow that. October 15, 2021  Patient is alert. Following commands. No fever. No shortness of breath    October 16, 2021  Patient was found to be able to have some oral intake from swallowing study. She is allowed puréed diet now. Afebrile. October 17, 2021  Patient is alert, oriented. Talking well. Answering questions well. Eating better. October 18, 2021  NG tube is found to be coiled in hiatal hernia area. Patient has not been fed through the feeding tube. She is able to eat by mouth. She denies abdominal pain. Denies shortness of breath. She denies chest pain. No diarrhea. No vomiting. No blood per rectum. No fever. Assessment & Plan:     Principal Problem:    Hemorrhage of right temporal lobe ([facility] [address] (10/10/2021)  With several subsequent CT of the brain, showing stable hemorrhage. Avoid antiplatelet, anticoagulants. Continue physical therapy. CTA showed aneurysm but not in the area of her hemorrhage. Not a candidate for surgical intervention. Patient is well alert now. Well oriented. Answering questions appropriately. Following commands now. No encephalopathy. Able to swallow. Continue with puree diet. We will continue to work on her eating by mouth. Hopefully she will be able to eat more. GI service will see her. Likely NG tube can be removed. Continue physical therapy. Active Problems:    Hypothyroidism (6/29/2012)  On supplemental levothyroxine      Dementia ([facility] [address] (10/3/2021)        Poorly-controlled hypertension (10/3/2021)  On amlodipine, carvedilol, clonidine patch      LEODAN (acute kidney injury) ([facility] [address] (10/10/2021)  Improved creatinine. Monitor renal function and intake and output. Avoid nephrotoxic agents. Renal function continues to improve. Acute metabolic encephalopathy (26/65/8366)  From intracranial hemorrhage. Resolved. Pleural effusion, bilateral (10/11/2021)  Small. Monitor.        Chronic diastolic congestive heart failure ([facility] (10/11/2021)  No decompensation. Monitor. PAF (paroxysmal atrial fibrillation) ([facility] (10/11/2021)  Not a candidate for anticoagulation        Dispo/Discharge Planning:    Pending insurance approval for rehab facility. Diet:  ADULT TUBE FEEDING Nasogastric; Standard with Fiber; Delivery Method: Continuous; Continuous Initial Rate (mL/hr): 10; Continuous Advance Tube Feeding: Yes; Advancement Volume (mL/hr): 10; Advancement Frequency: Other (Specify); Specify Other Adva. ..   ADULT DIET Dysphagia - Pureed; Mildly Thick (Nectar); single straw sip; 1:1 assistance  ADULT ORAL NUTRITION SUPPLEMENT Lunch, Dinner; Frozen Supplement  DVT PPx: SCD   Code status: Full Code         Hospital Problems as of 10/18/2021 Never Reviewed        Codes Class Noted - Resolved POA    Pleural effusion, bilateral ICD-10-CM: J90  ICD-9-CM: 511.9  10/11/2021 - Present Yes        Chronic diastolic congestive heart failure (HCC) ICD-10-CM: I50.32  ICD-9-CM: 428.32, 428.0  10/11/2021 - Present Yes        PAF (paroxysmal atrial fibrillation) (HCC) ICD-10-CM: I48.0  ICD-9-CM: 427.31  10/11/2021 - Present Yes        LEODAN (acute kidney injury) ([facility] ICD-10-CM: N17.9  ICD-9-CM: 584.9  10/10/2021 - Present No        Acute metabolic encephalopathy QNS-06-CO: G93.41  ICD-9-CM: 348.31  10/10/2021 - Present Yes        * (Principal) Hemorrhage of right temporal lobe ([facility] ICD-10-CM: I61.1  ICD-9-CM: 547  10/10/2021 - Present Yes        Dementia ([facility] ICD-10-CM: F03.90  ICD-9-CM: 294.20  10/3/2021 - Present Yes        Poorly-controlled hypertension ICD-10-CM: I10  ICD-9-CM: 401.9  10/3/2021 - Present Yes        Hypothyroidism ICD-10-CM: E03.9  ICD-9-CM: 244.9  6/29/2012 - Present Yes        RESOLVED: Acute pulmonary edema with congestive heart failure ([facility] ICD-10-CM: I50.1  ICD-9-CM: 428.1  10/11/2021 - 10/11/2021 Clinically Undetermined        RESOLVED: Atrial fibrillation with RVR ([facility] ICD-10-CM: I48.91  ICD-9-CM: endometriosis and discussed condition, possible outcomes and available treatments. At this time recommend that patient try continuous OCP use to determine if she can increase her pain free days. D/w patient that if no improvement noted can consider switching to different formulation of OCPs to take continuously for better relief. Questions addressed and answered and patient reports understanding. Advised patient to f/u 3 months to discuss efficacy of continuous OCP use.      Time spent with patient during today's call: 20 min      Patient was advised to call if they experience any new or worsening symptoms.       427.31  10/10/2021 - 10/11/2021 Clinically Undetermined        RESOLVED: UTI (urinary tract infection) ICD-10-CM: N39.0  ICD-9-CM: 599.0  7/2/2012 - 10/11/2021 Yes              Objective:     Patient Vitals for the past 24 hrs:   Temp Pulse Resp BP SpO2   10/18/21 0800 97.4 °F (36.3 °C) (!) 53 18 (!) 143/75 96 %   10/18/21 0400 97.6 °F (36.4 °C) (!) 55 18 (!) 143/69 95 %   10/18/21 0000 97.7 °F (36.5 °C) (!) 57 18 138/76 92 %   10/17/21 2006 97.8 °F (36.6 °C) (!) 53 18 133/67 96 %   10/17/21 2000 97.8 °F (36.6 °C) (!) 53 18 133/67 96 %   10/17/21 1600 98.3 °F (36.8 °C) (!) 53 18 124/60 90 %   10/17/21 1200 98.6 °F (37 °C) (!) 52 18 124/60 97 %     Oxygen Therapy  O2 Sat (%): 96 % (10/18/21 0800)  Pulse via Oximetry: 78 beats per minute (10/12/21 1352)  O2 Device: None (Room air) (10/16/21 1950)  Skin Assessment: Other (see comment/note) (no breakdown; mucosa dry) (10/09/21 1900)  Skin Protection for O2 Device: N/A (10/03/21 2001)  O2 Flow Rate (L/min): 3 l/min (10/14/21 1920)  O2 Temperature: 35.6 °F (2 °C) (10/09/21 0705)    Estimated body mass index is 29.92 kg/m² as calculated from the following:    Height as of this encounter: 5' 5\" (1.651 m). Weight as of this encounter: 81.6 kg (179 lb 12.8 oz). Intake/Output Summary (Last 24 hours) at 10/18/2021 0901  Last data filed at 10/18/2021 0553  Gross per 24 hour   Intake    Output 750 ml   Net -750 ml         Physical Exam:     Visit Vitals  BP (!) 143/75 (BP 1 Location: Left upper arm, BP Patient Position: At rest)   Pulse (!) 53   Temp 97.4 °F (36.3 °C)   Resp 18   Ht 5' 5\" (1.651 m)   Wt 81.6 kg (179 lb 12.8 oz)   SpO2 96%   BMI 29.92 kg/m²        Blood pressure (!) 143/75, pulse (!) 53, temperature 97.4 °F (36.3 °C), resp. rate 18, height 5' 5\" (1.651 m), weight 81.6 kg (179 lb 12.8 oz), SpO2 96 %. General:    Well-nourished.   lying in bed. Fully alert . Following commands well.   Answering simple questions well.   head:  Normocephalic, atraumatic, pale, no jaundice  Eyes:  Sclerae appear normal.  Pupils equally round. ENT:  Nares appear normal, no drainage. Moist oral mucosa  Neck:  No restricted ROM. Trachea midline   CV:   RRR. No m/r/g. No jugular venous distension. Lungs:   CTAB. No wheezing, rhonchi, or rales. Respirations even, unlabored  Abdomen: Bowel sounds present. Soft, nontender, nondistended. Extremities: No cyanosis or clubbing. No edema. Able to move all extremities. Skin:     No rashes and normal coloration. Warm and dry. Neuro:  CN II-XII grossly intact. moving all extremities following commands.        I have reviewed ordered lab tests and independently visualized imaging below:    Recent Labs:  Recent Results (from the past 48 hour(s))   PLEASE READ & DOCUMENT PPD TEST IN 24 HRS    Collection Time: 10/16/21  4:21 PM   Result Value Ref Range    PPD Negative Negative    mm Induration 0 0 - 5 mm   METABOLIC PANEL, BASIC    Collection Time: 10/17/21  6:02 AM   Result Value Ref Range    Sodium 139 136 - 145 mmol/L    Potassium 4.3 3.5 - 5.1 mmol/L    Chloride 103 98 - 107 mmol/L    CO2 36 (H) 21 - 32 mmol/L    Anion gap 0 (L) 7 - 16 mmol/L    Glucose 129 (H) 65 - 100 mg/dL    BUN 34 (H) 8 - 23 MG/DL    Creatinine 1.18 (H) 0.6 - 1.0 MG/DL    GFR est AA 56 (L) >60 ml/min/1.73m2    GFR est non-AA 46 (L) >60 ml/min/1.73m2    Calcium 9.1 8.3 - 10.4 MG/DL   PLEASE READ & DOCUMENT PPD TEST IN 48 HRS    Collection Time: 10/17/21  4:43 PM   Result Value Ref Range    PPD Negative Negative    mm Induration 0 0 - 5 mm   METABOLIC PANEL, BASIC    Collection Time: 10/18/21  5:39 AM   Result Value Ref Range    Sodium 137 136 - 145 mmol/L    Potassium 4.7 3.5 - 5.1 mmol/L    Chloride 102 98 - 107 mmol/L    CO2 32 21 - 32 mmol/L    Anion gap 3 (L) 7 - 16 mmol/L    Glucose 96 65 - 100 mg/dL    BUN 28 (H) 8 - 23 MG/DL    Creatinine 1.25 (H) 0.6 - 1.0 MG/DL    GFR est AA 53 (L) >60 ml/min/1.73m2    GFR est non-AA 44 (L) >60 ml/min/1.73m2    Calcium 9.0 8.3 - 10.4 MG/DL   MAGNESIUM    Collection Time: 10/18/21  5:39 AM   Result Value Ref Range    Magnesium 2.0 1.8 - 2.4 mg/dL   PHOSPHORUS    Collection Time: 10/18/21  5:39 AM   Result Value Ref Range    Phosphorus 3.1 2.3 - 3.7 MG/DL       All Micro Results     Procedure Component Value Units Date/Time    COVID-19 RAPID TEST [[id number]] Collected: 10/12/21 0926    Order Status: Completed Specimen: Nasopharyngeal Updated: 10/12/21 1014     Specimen source NASAL        COVID-19 rapid test Not detected        Comment:      The specimen is NEGATIVE for SARS-CoV-2, the novel coronavirus associated with COVID-19. A negative result does not rule out COVID-19. This test has been authorized by the FDA under an Emergency Use Authorization (EUA) for use by authorized laboratories. Fact sheet for Healthcare Providers: [employer]co[employer]nz  Fact sheet for Patients: [employer]       Methodology: Isothermal Nucleic Acid Amplification         CULTURE, URINE [[id number]]  (Abnormal)  (Susceptibility) Collected: 10/04/21 2107    Order Status: Completed Specimen: Cath Urine Updated: 10/07/21 0749     Special Requests: NO SPECIAL REQUESTS        Culture result:       >100,000 COLONIES/mL KLEBSIELLA PNEUMONIAE                Other Studies:  XR ABD ACUTE W 1 V CHEST    Result Date: 10/17/2021  ACUTE ABDOMINAL SERIES HISTORY: Nausea and constipation and ileus or small bowel obstruction. COMPARISON: 10/14/2021 FINDINGS: A right-sided PICC line is present with catheter tip in the SVC. A feeding tube is coiled in the midline of the lower chest is likely situated within a hiatal hernia. Atelectasis is present in the medial right lung base. There is no subdiaphragmatic free intraperitoneal air. Enteric contrast is scattered throughout the colon. Cholecystectomy clips are present. A right hip prosthesis is present. 1. Feeding tube coiled in a hiatal hernia.  2. Contrast from a recent barium swallow 10/15/2021 has progressed into the colon. No acute abdominal findings. Current Meds:  Current Facility-Administered Medications   Medication Dose Route Frequency    ondansetron (ZOFRAN) injection 8 mg  8 mg IntraVENous Q6H PRN    nystatin (MYCOSTATIN) 100,000 unit/gram powder   Topical BID    nystatin (MYCOSTATIN) 100,000 unit/mL oral suspension 500,000 Units  500,000 Units Oral QID    NUTRITIONAL SUPPORT ELECTROLYTE PRN ORDERS   Does Not Apply PRN    levothyroxine (SYNTHROID) tablet 112 mcg  112 mcg Per G Tube ACB    amLODIPine (NORVASC) tablet 10 mg  10 mg Per G Tube DAILY    carvediloL (COREG) tablet 6.25 mg  6.25 mg Per G Tube BID WITH MEALS    cloNIDine (CATAPRES) 0.3 mg/24 hr patch 1 Patch  1 Patch TransDERmal Q7D    hydrALAZINE (APRESOLINE) 20 mg/mL injection 15 mg  15 mg IntraVENous Q4H PRN    central line flush (saline) syringe 10 mL  10 mL InterCATHeter Q8H    [Held by provider] furosemide (LASIX) injection 40 mg  40 mg IntraVENous BID    [Held by provider] QUEtiapine (SEROquel) tablet 12.5 mg  12.5 mg Oral QHS    acetaminophen (TYLENOL) tablet 650 mg  650 mg Oral Q4H PRN    [Held by provider] levETIRAcetam (KEPPRA) 500 mg in 0.9% sodium chloride (MBP/ADV) 100 mL MBP  500 mg IntraVENous Q12H    [Held by provider] divalproex (DEPAKOTE SPRINKLE) capsule 125 mg  125 mg Oral Q12H    LORazepam (ATIVAN) injection 1 mg  1 mg IntraVENous Q4H PRN    sodium chloride (NS) flush 5-40 mL  5-40 mL IntraVENous PRN       Signed:  Robert Lyons MD    Part of this note may have been written by using a voice dictation software. The note has been proof read but may still contain some grammatical/other typographical errors.

## 2021-10-18 NOTE — PROGRESS NOTES
Nurse called patient's pharmacy on [address] to verify medications. Patient does not take meclizine at home, as she states she does. Hospitalist notified.

## 2021-10-18 NOTE — PROGRESS NOTES
LTG: Patient will tolerate least restrictive diet without overt signs or symptoms of airway compromise. STG: Patient will participate modified barium swallow study to objectively assess swallow function. MET 10/15  STG: Patient will tolerate pureed diet and mildly thick liquids(Nectar) via single straw sip with 1:1 assistance. Added 10/15. Progressing 10/18. STG: Patient will participate in ongoing trials in efforts to advance diet. Added 10/15  SPEECH LANGUAGE PATHOLOGY: DYSPHAGIA- Daily Note 1    NAME/AGE/GENDER: José Miguel Garcia is a 78 y.o. female  DATE: 10/18/2021  PRIMARY DIAGNOSIS: Intracranial bleed (HCC) [I62.9]  Dementia ([facility] [F03.90]  Poorly-controlled hypertension [I10]  Hypothyroidism [E03.9]  Encephalopathy, unspecified [G93.40]  Procedure(s) (LRB):  ESOPHAGOGASTRODUODENOSCOPY (EGD) with NG tube placement Room 715 (N/A) 6 Days Post-Op  ICD-10: Treatment Diagnosis: R13.12 Dysphagia, Oropharyngeal Phase    RECOMMENDATIONS   DIET:    Pureed   Mildly Thick Liquids (Nectar)by straw    MEDICATIONS: Crushed in applesauce as tolerated     ASPIRATION PRECAUTIONS  · Slow rate of intake  · Small bites/sips  · Upright at 90 degrees during meal     COMPENSATORY STRATEGIES/MODIFICATIONS  · Assistance      RECOMMENDATIONS for CONTINUED SPEECH THERAPY:   YES: Anticipate need for ongoing speech therapy during this hospitalization. ASSESSMENT   Per modified barium swallow study on 10/15, patient presents with oropharyngeal dysphagia characterized by reduced bolus containment with premature spillage the pyriforms, reduced epiglottic inversion, and an instance of shallow nonclearing penetration with thin liquids. Patient appears to be tolerating mildly thick liquids by straw with no overt s/sx of airway compromise. Mentation appears to have returned closer to baseline as compared to 10/15. Due to increased nausea, po trials limited and patient declined further trials of puree and chewables.      Recommend continue puree and mildly thick by straw. Crush meds in applesauce. Needs 1:1 assistance. Will continue to follow for diet tolerance and po trials as nausea subsides. EDUCATION:   Recommendations discussed with Patient and RN  CONTINUATION OF SKILLED SERVICES/MEDICAL NECESSITY:   Patient is expected to demonstrate progress in  swallow strength, swallow timeliness, swallow function, diet tolerance and swallow safety in order to  improve swallow safety, work toward diet advancement and decrease aspiration risk. REHABILITATION POTENTIAL FOR STATED GOALS: Good    PLAN    FREQUENCY/DURATION: Continue to follow patient 3 times a week for duration of hospital stay to address above goals. Recommendations for next treatment session: Next treatment session will address diet tolerance and po trials    SUBJECTIVE   Quietly resting in bed. Reports continued nausea and vomiting episode earlier in the morning. Oxygen Device: Room air. Pain: Pain Scale 1: Numeric (0 - 10)  Pain Intensity 1: 0    History of Present Injury/Illness: Ms. Dasha Carr  has a past medical history of Endocrine disease, Hypertension, and Other ill-defined conditions(799.89). . She also  has no past surgical history on file. PRECAUTIONS/ALLERGIES: Patient has no known allergies. Problem List:  (Impairments causing functional limitations):  1. Oropharyngeal dysphagia   2. CVA workup     OBJECTIVE     Dysphagia evaluation:   Patient consumed ~5 trials of mildly thick ginger ale by straw. Timely oral transit and adequate clearance. Single swallow per sip observed. Voicing clear throughout. No overt s/sx of airway compromise. Appeared to tolerate mildly thick liquids by straw. Patient reported feeling nauseous and declined further trials of mildly thick by straw, puree, and chewables.      Tool Used: Dysphagia Outcome and Severity Scale (KEVIN)    Score Comments   Normal Diet  [] 7 With no strategies or extra time needed   Functional Swallow  [] 6 May have mild oral or pharyngeal delay   Mild Dysphagia  [] 5 Which may require one diet consistency restricted    Mild-Moderate Dysphagia  [x] 4 With 1-2 diet consistencies restricted   Moderate Dysphagia  [] 3 With 2 or more diet consistencies restricted   Moderate-Severe Dysphagia  [] 2 With partial PO strategies (trials with ST only)   Severe Dysphagia  [] 1 With inability to tolerate any PO safely      Score:  Initial: 3 Most Recent: 4 (Date 10/18/21 )   Interpretation of Tool: The Dysphagia Outcome and Severity Scale (KEVIN) is a simple, easy-to-use, 7-point scale developed to systematically rate the functional severity of dysphagia based on objective assessment and make recommendations for diet level, independence level, and type of nutrition.      INTERDISCIPLINARY COLLABORATION: RN    After treatment position/precautions:  · Upright in bed  · Call light within reach    Total Treatment Duration:   Time In: [address]   Time Out: [address] [facility], [facility] Student

## 2021-10-18 NOTE — PROGRESS NOTES
Physical Therapy Note:    Attempted to see patient this AM for physical therapy treatment session. RN currently doing a dressing change.  Will re-attempt this PM. Thank you,    Reese Hankins PT, DPT  10/18/21 11:30AM

## 2021-10-18 NOTE — PROGRESS NOTES
Comprehensive Nutrition Assessment    Type and Reason for Visit: Reassess  Tube Feeding Management (hospitalist)    Nutrition Recommendations/Plan:   · Enteral Nutrition - Resume TF as medically appropriate/once FT is OK to use per MD  · Enteral Access: Nasogastric  · Formula: Jevity 1.5 Shabbir (Standard with Fiber)  · Delivery: Continuous feedinml/hour. · Water flush 135 ml every 4 hours  · Modulars: None   · Enteral regimen at goal will provide 1650 calories (100% estimated calorie needs), 71 grams protein (99% estimated protein needs) and 1646ml free fluid (~1ml/kcal) calculations based on 22 hour infusion. · Nutritional Supplement Therapy:   · Implement electrolyte replacement per nutrition support protocols  · Replacement indicated:  · No replacements warranted this AM  · Labs:   · EN labs: BMP daily, Mg MWF and Phos MWF.     · Meals and Snacks:  · Continue current diet. Recommendations per SLP  · Nutrition Supplement Therapy:  · Medical food supplement therapy:  Continue Magic Cup twice per day (this provides 290 kcal and 9 grams protein per serving) - flavor preference noted  · Start Nepro TID with meals (provides 425 kcal and 19 gm PRO per bottle) - flavor preference noted   Bowel regimen:    Start daily miralax and pericolace (2 tab) - d/w Dr. Milo Trejo. Malnutrition Assessment:  Malnutrition Status: At risk for malnutrition (specify) (adv age, poor PO intake, unknown baseline)    Nutrition Assessment:   Nutrition History: Unable to assess. Nutrition Background: Patient with PMH signifncant for HTN, dementia. She presented with headache and increased confusion. CT of head showed right temporal IPH around 2 cm  Daily Update:  Pt seen reclined in bed. Pt's  at bedside. Pt reports that she did not receive a breakfast or lunch tray today - RN confirms pt did receive a breakfast tray this morning in which she consumed bites of and that she refused her lunch tray.  NGT remains in place although remains off d/t pt c/o nausea and constipation last evening. Abdominal XR results reviewed. Abdominal Status (last documented): Intact abdomen with Hypoactive  bowel sounds. Last BM 10/06/21. Pertinent Medications: lasix (held), PRN zofran  No bowel regimen in place. Lab Results   Component Value Date/Time    Sodium 137 10/18/2021 05:39 AM    Potassium 4.7 10/18/2021 05:39 AM    Chloride 102 10/18/2021 05:39 AM    CO2 32 10/18/2021 05:39 AM    Anion gap 3 (L) 10/18/2021 05:39 AM    Glucose 96 10/18/2021 05:39 AM    BUN 28 (H) 10/18/2021 05:39 AM    Creatinine 1.25 (H) 10/18/2021 05:39 AM    Calcium 9.0 10/18/2021 05:39 AM    Albumin 3.1 (L) 10/04/2021 05:31 AM    Magnesium 2.0 10/18/2021 05:39 AM    Phosphorus 3.1 10/18/2021 05:39 AM     Nutrition Related Findings:   NFPE with no physcial signs of malnutrition. 10/11- Pt pending IR for tube placement, TF to begin pending clearance. 10/13- NGT placed per GI 10/12, Jevity 1.5 began infusing 10/12. 10/15- TF remains off following MBS this AM. Now on puree diet w/ nectar thick liquids. Abdominal XR revealing FT coiled in [facility] and contrast from MBS in colon. Current Nutrition Therapies:  ADULT DIET Dysphagia - Pureed; Mildly Thick (Nectar); single straw sip; 1:1 assistance  ADULT ORAL NUTRITION SUPPLEMENT Lunch, Dinner; Frozen Supplement  Current Tube Feeding (TF) Orders:   · Feeding Route: Nasogastric  · Formula: Standard with fiber  · Schedule:Continuous    · Regimen: 50ml/hr  · Additives/Modulars:  (None)  · Water Flushes: 135ml Q4H  · Current TF & Flush Orders Provides: on hold  · Goal TF & Flush Orders Provides: Enteral regimen at goal will provide 1650 calories (100% estimated calorie needs), 71 grams protein (99% estimated protein needs) and 1646ml free fluid (~1ml/kcal) calculations based on 22 hour infusion.      Current Intake:   Average Meal Intake: 1-25% (per RN, bites of bkfst, 0% lunch (10/18)) Average Supplement Intake: None ordered Anthropometric Measures:  Height: 5' 5\" (165.1 cm)  Current Body Wt: 81.6 kg (179 lb 14.3 oz) (10/17), Weight source: Bed scale  BMI: 29.9, Overweight (BMI 25.0-29. 9)  Admission Body Weight: 159 lb 6.3 oz (bed scale 10/3)  Ideal Body Weight (lbs) (Calculated): 125 lbs (57 kg), 129.5 %  Usual Body Wt: 72.1 kg (158 lb 15.2 oz) (per EMR review last weighed 4/2 office wt), Percent weight change: -0.4          Edema: No data recorded   Estimated Daily Nutrient Needs:  Energy (kcal/day): 5822-8142 (Kcal/kg (20-25), Weight Used: Current (71.8 kg (10/10)))  Protein (g/day): 72-86 (1-1.2 g/kg) Weight Used: (Current)  Fluid (ml/day):   (1 ml/kcal)    Nutrition Diagnosis:   · Inadequate oral intake related to cognitive or neurological impairment as evidenced by  (recent diet modification from NPO to puree diet)    Nutrition Interventions:   Food and/or Nutrient Delivery: Continue current diet, Start oral nutrition supplement, Continue oral nutrition supplement, Continue tube feeding     Coordination of Nutrition Care: Continue to monitor while inpatient  Plan of Care discussed with Allison Avelar RN and Dr. Lieutenant Coleman    Goals:   Previous Goal Met: No progress toward goal(s)  Active Goal: Meet >50% of estimated needs via PO intake vs restart TF    Nutrition Monitoring and Evaluation:      Food/Nutrient Intake Outcomes: Diet advancement/tolerance, Food and nutrient intake, Supplement intake, Enteral nutrition intake/tolerance  Physical Signs/Symptoms Outcomes: Biochemical data, Constipation, Hemodynamic status, Meal time behavior, Weight    Discharge Planning:     Too soon to determine    Iron Cortez [address], [address], LD, [address]    Disaster Mode Active

## 2021-10-18 NOTE — PROGRESS NOTES
10/18/21 0718   NIH Stroke Scale   Interval Handoff/Transfer  (Dual with Marilyn Shankar RN)   Level of Conciousness (1a) 0   LOC Questions (1b) (!) 2   LOC Commands (1c) 0   Best Gaze (2) 0   Visual (3) 0   Facial Palsy (4) 0   Motor Arm, Left (5a) 0   Motor Arm, Right (5b) 0   Motor Leg, Left (6a) (!) 2   Motor Leg, Right (6b) (!) 2   Limb Ataxia (7) 0   Sensory (8) 0   Best Language (9) 0   Dysarthria (10) 0   Extinction and Inattention (11) 0   Total 6

## 2021-10-18 NOTE — PROGRESS NOTES
Problem: Hemorrhagic Stroke: Day 4  Goal: Activity/Safety  Outcome: Resolved/Met  Goal: Activity/Safety  Outcome: Resolved/Met  Goal: Consults, if ordered  Outcome: Resolved/Met  Goal: Diagnostic Test/Procedures  Outcome: Resolved/Met  Goal: Nutrition/Diet  Outcome: Resolved/Met  Goal: Medications  Outcome: Resolved/Met  Goal: Respiratory  Outcome: Resolved/Met  Goal: Treatments/Interventions/Procedures  Outcome: Resolved/Met  Goal: Psychosocial  Outcome: Resolved/Met  Goal: *Hemodynamically stable  Outcome: Resolved/Met  Goal: *Verbalizes anxiety and depression are reduced or absent  Outcome: Resolved/Met  Goal: *Absence of aspiration  Outcome: Resolved/Met  Goal: *Absence of signs and symptoms of DVT  Outcome: Resolved/Met  Goal: *Optimal pain control at patient's stated goal  Outcome: Resolved/Met  Goal: *Tolerating diet  Outcome: Resolved/Met  Goal: *Progressive mobility and function  Outcome: Resolved/Met  Goal: *Rehabilitation readiness  Outcome: Resolved/Met

## 2021-10-18 NOTE — PROGRESS NOTES
Problem: Patient Education: Go to Patient Education Activity  Goal: Patient/Family Education  Outcome: Progressing Towards Goal     Problem: Hemorrhagic Stroke: Day 5 through Discharge  Goal: Off Pathway (Use only if patient is Off Pathway)  Outcome: Progressing Towards Goal  Goal: Activity/Safety  Outcome: Progressing Towards Goal  Goal: Consults, if ordered  Outcome: Progressing Towards Goal  Goal: Diagnostic Test/Procedures  Outcome: Progressing Towards Goal  Goal: Nutrition/Diet  Outcome: Progressing Towards Goal  Goal: Medications  Outcome: Progressing Towards Goal  Goal: Respiratory  Outcome: Progressing Towards Goal  Goal: Treatments/Interventions/Procedures  Outcome: Progressing Towards Goal  Goal: Psychosocial  Outcome: Progressing Towards Goal  Goal: *Hemodynamically stable  Outcome: Progressing Towards Goal  Goal: *Verbalizes anxiety and depression are reduced or absent  Outcome: Progressing Towards Goal  Goal: *Absence of aspiration  Outcome: Progressing Towards Goal  Goal: *Absence of signs and symptoms of DVT  Outcome: Progressing Towards Goal  Goal: *Optimal pain control at patient's stated goal  Outcome: Progressing Towards Goal  Goal: *Tolerating diet  Outcome: Progressing Towards Goal  Goal: *Progressive mobility and function  Outcome: Progressing Towards Goal  Goal: *Rehabilitation readiness  Outcome: Progressing Towards Goal     Problem: Hemorrhagic Stroke: Discharge Outcomes  Goal: *Verbalizes anxiety and depression are reduced or absent  Outcome: Progressing Towards Goal  Goal: *Verbalize understanding of risk factor modification(Stroke Metric)  Outcome: Progressing Towards Goal  Goal: *Optimal pain control at patient's stated goal  Outcome: Progressing Towards Goal  Goal: *Hemodynamically stable  Outcome: Progressing Towards Goal  Goal: *Absence of aspiration pneumonia  Outcome: Progressing Towards Goal  Goal: *Aware of needed dietary changes  Outcome: Progressing Towards Goal  Goal: *Verbalizes understanding and describes medication purposes and frequencies  Outcome: Progressing Towards Goal  Goal: *Tolerating diet  Outcome: Progressing Towards Goal  Goal: *Absence of signs and symptoms of DVT  Outcome: Progressing Towards Goal  Goal: *Absence of aspiration  Outcome: Progressing Towards Goal  Goal: *Progressive mobility and function  Outcome: Progressing Towards Goal  Goal: *Home safety concerns addressed  Outcome: Progressing Towards Goal

## 2021-10-18 NOTE — PROGRESS NOTES
ACUTE PHYSICAL THERAPY GOALS:  (Developed with and agreed upon by patient and/or caregiver.)  1. Patient will perform bed mobility with MODERATE ASSISTANCE within 3 days. MET 10/13/21  2. Patient will transfer sit to stand with MODERATE ASSISTANCE  3. Patient will transfer from bed to chair with MAXIMAL ASSISTANCE within 3 days. 4. Patient will demonstrate GOOD STATIC STANDING balance within 3 day(s). 5. Patient will tolerate 15+ minutes of therapeutic activity/exercise and/or neuromuscular re-education while maintaining stable vitals to improve functional strength and activity tolerance within 3 days. MET 10/13/21     LT. Patient will perform bed mobility with MINIMAL ASSISTANCE within 7 days. 2. Patient will transfer bed to chair with MINIMAL ASSISTANCE within 7 days. 3. Patient will demonstrate GOOD DYNAMIC SITTING balance within 7 day(s). 4. Patient will ambulate 50ft+ using least restrictive assistive device and MINIMAL ASSISTANCE within 7 days. 5. Patient will tolerate 25+ minutes of therapeutic activity/exercise and/or neuromuscular re-education while maintaining stable vitals to improve functional strength and activity tolerance within 7 days.     PHYSICAL THERAPY: Daily Note and PM Treatment Day # 5    Zachary Fuentes is a 78 y.o. female   PRIMARY DIAGNOSIS: Hemorrhage of right temporal lobe (HCC)  Intracranial bleed ([facility] [address] [I62.9]  Dementia ([facility] [address] [F03.90]  Poorly-controlled hypertension [I10]  Hypothyroidism [E03.9]  Encephalopathy, unspecified [G93.40]  Procedure(s) (LRB):  ESOPHAGOGASTRODUODENOSCOPY (EGD) with NG tube placement Room 715 (N/A)  6 Days Post-Op    ASSESSMENT:     REHAB RECOMMENDATIONS: CURRENT LEVEL OF FUNCTION:  (Most Recently Demonstrated)   Recommendation to date pending progress:  Setting:   Short-term Rehab  Equipment:    To Be Determined Bed Mobility:   Moderate Assistance  Sit to Stand:   Moderate Assistance x 2  Transfers:   Moderate Assistance x 2  Gait/Mobility:   Not tested     ASSESSMENT:  Ms. Zarina Saucedo is an 78year old female admitted with AMS and found to have right intraparenchymal hemorrhage and SDH. Patient seen this PM for PT treatment session. Patient presents alert, oriented x3, and motivated to participate. Today, required minimal to moderate assistance x1-2 for bed mobility. With initiating mobility, patient c/o nausea, spinning, and dizziness. Dry heaving intermittently x10-15 minutes. Vitals WNL. Eyes closed and stillness alleviated symptoms; mobility exacerbated symptoms. Addressing sitting activity tolerance and bed mobility (scooting, bridging) with multimodal cues. Patient progression limited by nausea this date. Nausea has been present over the course of the last few treatments with mobility as the onset of symptoms. Improved movement and command following appreciated overall. Continue to recommend STR at discharge. Of note, patient and spouse endorse extensive peripheral vertigo history and baseline taking meclizine. Patient states, \"this feels like my vertigo. \" (Primary RN notified.) No nystagmus appreciated; patient too symptomatic for formal vestibular assessment on this date. (Appreciate nausea in setting of NGT and central hemorrhage as well.)     SUBJECTIVE:   Ms. Zarina Saucedo states, \"I feel sick. \"    SOCIAL HISTORY/ LIVING ENVIRONMENT: Ambulatory with a SPC and spouse assists with ADLs PRN. Support Systems: Spouse/Significant Other Dejah Paulino [phone] (spouse))  OBJECTIVE:     PAIN: VITAL SIGNS: LINES/DRAINS:   Pre Treatment: Pain Screen  Pain Scale 1: FLACC  Pain Intensity 1: 3 (c/o pain with nausea (itching, burning))  Pain Onset 1: mobility  Pain Location 1: Abdomen  Pain Orientation 1: Anterior  Pain Description 1: Sore (itching burning)  Post Treatment: FLACC unchanged 3; patient continues to c/o abdominal pain and nausea.     Reynaga Catheter, IV and Nasogastric Tube  O2 Device: None (Room air)     MOBILITY: I Mod I S SBA CGA Min Mod Max Total  NT x2 Comments:   Bed Mobility    Rolling [] [] [] [] [] [] [] [] [] [x] [] Bridging with CGA   Supine to Sit [] [] [] [] [] [x] [x] [] [] [] [x]    Scooting [] [] [] [] [] [x] [] [] [] [] [x]    Sit to Supine [] [] [] [] [] [] [x] [] [] [x] []    Transfers    Sit to Stand [] [] [] [] [] [] [] [] [] [x] []    Bed to Chair [] [] [] [] [] [] [] [] [] [x] []    Stand to Sit [] [] [] [] [] [] [] [] [] [x] []    I=Independent, Mod I=Modified Independent, S=Supervision, SBA=Standby Assistance, CGA=Contact Guard Assistance,   Min=Minimal Assistance, Mod=Moderate Assistance, Max=Maximal Assistance, Total=Total Assistance, NT=Not Tested    BALANCE: Good Fair+ Fair Fair- Poor NT Comments   Sitting Static [] [x] [] [] [] []    Sitting Dynamic [] [] [x] [] [] []              Standing Static [] [] [] [] [] [x]    Standing Dynamic [] [] [] [] [] []      GAIT: I Mod I S SBA CGA Min Mod Max Total  NT x2 Comments:   Level of Assistance [] [] [] [] [] [] [] [] [] [x] []    Distance N/A    DME N/A    Gait Quality N/A    Weightbearing  Status N/A     I=Independent, Mod I=Modified Independent, S=Supervision, SBA=Standby Assistance, CGA=Contact Guard Assistance,   Min=Minimal Assistance, Mod=Moderate Assistance, Max=Maximal Assistance, Total=Total Assistance, NT=Not Tested    PLAN:   FREQUENCY/DURATION: PT Plan of Care: 3 times/week for duration of hospital stay or until stated goals are met, whichever comes first.  TREATMENT:     TREATMENT:   ($$ Therapeutic Activity: 23-37 mins    )  Therapeutic Activity (23 Minutes): Therapeutic activity included Supine to Sit, Sit to Supine, Scooting and bridging, functional activity tolerance, and positioning to improve functional Mobility, Strength and Activity tolerance.     At this time, patient is appropriate for Co-treatment with occupational therapy due to patient's decreased overall endurance/tolerance levels, as well as need for high level skilled assistance to complete functional transfers/mobility and functional tasks. Colby Anoopsarah is appropriate for a multidisciplinary co-treatment of PT and OT to address goals of both disciplines.       TREATMENT GRID:  N/A    AFTER TREATMENT POSITION/PRECAUTIONS:  Alarm Activated, Bed, Needs within reach, RN notified and RN updated on patient status/progress/nausea    INTERDISCIPLINARY COLLABORATION:  RN/PCT, PT/PTA and OT/ALMEIDA    TOTAL TREATMENT DURATION:  PT Patient Time In/Time Out  Time In: 1337  Time Out: LUIZ Fuller

## 2021-10-19 LAB
ANION GAP SERPL CALC-SCNC: 2 MMOL/L (ref 7–16)
BUN SERPL-MCNC: 29 MG/DL (ref 8–23)
CALCIUM SERPL-MCNC: 8.8 MG/DL (ref 8.3–10.4)
CHLORIDE SERPL-SCNC: 105 MMOL/L (ref 98–107)
CO2 SERPL-SCNC: 31 MMOL/L (ref 21–32)
CREAT SERPL-MCNC: 1.29 MG/DL (ref 0.6–1)
GLUCOSE SERPL-MCNC: 90 MG/DL (ref 65–100)
POTASSIUM SERPL-SCNC: 4.5 MMOL/L (ref 3.5–5.1)
SODIUM SERPL-SCNC: 138 MMOL/L (ref 136–145)

## 2021-10-19 PROCEDURE — 2709999900 HC NON-CHARGEABLE SUPPLY

## 2021-10-19 PROCEDURE — 97110 THERAPEUTIC EXERCISES: CPT

## 2021-10-19 PROCEDURE — 97530 THERAPEUTIC ACTIVITIES: CPT

## 2021-10-19 PROCEDURE — 80048 BASIC METABOLIC PNL TOTAL CA: CPT

## 2021-10-19 PROCEDURE — 36592 COLLECT BLOOD FROM PICC: CPT

## 2021-10-19 PROCEDURE — 74011000258 HC RX REV CODE- 258: Performed by: STUDENT IN AN ORGANIZED HEALTH CARE EDUCATION/TRAINING PROGRAM

## 2021-10-19 PROCEDURE — 74011250637 HC RX REV CODE- 250/637: Performed by: STUDENT IN AN ORGANIZED HEALTH CARE EDUCATION/TRAINING PROGRAM

## 2021-10-19 PROCEDURE — 74011250637 HC RX REV CODE- 250/637: Performed by: INTERNAL MEDICINE

## 2021-10-19 PROCEDURE — 74011250636 HC RX REV CODE- 250/636: Performed by: STUDENT IN AN ORGANIZED HEALTH CARE EDUCATION/TRAINING PROGRAM

## 2021-10-19 PROCEDURE — 74011250636 HC RX REV CODE- 250/636: Performed by: INTERNAL MEDICINE

## 2021-10-19 PROCEDURE — 65270000029 HC RM PRIVATE

## 2021-10-19 PROCEDURE — 92526 ORAL FUNCTION THERAPY: CPT

## 2021-10-19 RX ORDER — CARVEDILOL 6.25 MG/1
6.25 TABLET ORAL 2 TIMES DAILY WITH MEALS
Status: DISCONTINUED | OUTPATIENT
Start: 2021-10-19 | End: 2021-10-22 | Stop reason: HOSPADM

## 2021-10-19 RX ORDER — AMLODIPINE BESYLATE 10 MG/1
10 TABLET ORAL DAILY
Status: DISCONTINUED | OUTPATIENT
Start: 2021-10-19 | End: 2021-10-22 | Stop reason: HOSPADM

## 2021-10-19 RX ORDER — AMLODIPINE BESYLATE 10 MG/1
10 TABLET ORAL DAILY
Status: DISCONTINUED | OUTPATIENT
Start: 2021-10-20 | End: 2021-10-19

## 2021-10-19 RX ORDER — SODIUM CHLORIDE 9 MG/ML
50 INJECTION, SOLUTION INTRAVENOUS CONTINUOUS
Status: DISCONTINUED | OUTPATIENT
Start: 2021-10-19 | End: 2021-10-20

## 2021-10-19 RX ADMIN — ESCITALOPRAM OXALATE 10 MG: 5 SOLUTION ORAL at 09:20

## 2021-10-19 RX ADMIN — Medication 10 ML: at 05:55

## 2021-10-19 RX ADMIN — NYSTATIN: 100000 POWDER TOPICAL at 09:21

## 2021-10-19 RX ADMIN — Medication 10 ML: at 21:27

## 2021-10-19 RX ADMIN — Medication 10 ML: at 14:31

## 2021-10-19 RX ADMIN — NYSTATIN: 100000 POWDER TOPICAL at 16:54

## 2021-10-19 RX ADMIN — SENNOSIDES AND DOCUSATE SODIUM 2 TABLET: 8.6; 5 TABLET ORAL at 09:16

## 2021-10-19 RX ADMIN — LEVETIRACETAM 500 MG: 100 INJECTION, SOLUTION INTRAVENOUS at 16:47

## 2021-10-19 RX ADMIN — LEVOTHYROXINE SODIUM 150 MCG: 0.07 TABLET ORAL at 05:55

## 2021-10-19 RX ADMIN — SODIUM CHLORIDE 50 ML/HR: 900 INJECTION, SOLUTION INTRAVENOUS at 15:48

## 2021-10-19 RX ADMIN — POLYETHYLENE GLYCOL 3350 17 G: 17 POWDER, FOR SOLUTION ORAL at 09:16

## 2021-10-19 RX ADMIN — NYSTATIN 10 ML: 100000 SUSPENSION ORAL at 05:55

## 2021-10-19 RX ADMIN — MECLIZINE 25 MG: 12.5 TABLET ORAL at 09:28

## 2021-10-19 RX ADMIN — DIVALPROEX SODIUM 125 MG: 125 CAPSULE ORAL at 21:27

## 2021-10-19 RX ADMIN — AMLODIPINE BESYLATE 10 MG: 10 TABLET ORAL at 09:20

## 2021-10-19 NOTE — PROGRESS NOTES
Daughter called to inform nurse that the patient was taking tramadol at rehab and it was making her dizzy so they stopped giving it to her.  RN took off the home medication list

## 2021-10-19 NOTE — PROGRESS NOTES
LTG: Patient will tolerate least restrictive diet without overt signs or symptoms of airway compromise. STG: Patient will participate modified barium swallow study to objectively assess swallow function. MET 10/15  STG: Patient will tolerate pureed diet and mildly thick liquids(Nectar) via single straw sip with 1:1 assistance. Added 10/15. Progressing 10/18. MET 10/19   STG: Patient will participate in ongoing trials in efforts to advance diet. Added 10/15  SPEECH LANGUAGE PATHOLOGY: DYSPHAGIA- Daily Note 2    NAME/AGE/GENDER: Joelyn Denver is a 78 y.o. female  DATE: 10/19/2021  PRIMARY DIAGNOSIS: Intracranial bleed ([facility] [address] [I62.9]  Dementia ([facility] [address] [F03.90]  Poorly-controlled hypertension [I10]  Hypothyroidism [E03.9]  Encephalopathy, unspecified [G93.40]  Procedure(s) (LRB):  ESOPHAGOGASTRODUODENOSCOPY (EGD) with NG tube placement Room 715 (N/A) 7 Days Post-Op  ICD-10: Treatment Diagnosis: R13.12 Dysphagia, Oropharyngeal Phase    RECOMMENDATIONS   DIET:    Pureed   Mildly Thick Liquids (Nectar)by straw    MEDICATIONS: Crushed in applesauce as tolerated     ASPIRATION PRECAUTIONS  · Slow rate of intake  · Small bites/sips  · Upright at 90 degrees during meal     COMPENSATORY STRATEGIES/MODIFICATIONS  · Assistance with all intake     RECOMMENDATIONS for CONTINUED SPEECH THERAPY:   YES: Anticipate need for ongoing speech therapy during this hospitalization and at next level of care. ASSESSMENT   Patient continues to demonstrate poor oral intake complicated by nausea and what she describes as \"vertigo\". Adequate acceptance, oral clearance and No overt s/sx airway compromise with current diet, but again limited trials accepted. Recommend continue puree and mildly thick by straw. Crush meds in applesauce. Needs 1:1 assistance. Will continue to follow for po trials in efforts to advance diet as nausea subsides and patient more willing to accept po.        EDUCATION:   Recommendations discussed with Patient and RN  CONTINUATION OF SKILLED SERVICES/MEDICAL NECESSITY:   Patient is expected to demonstrate progress in  swallow strength, swallow timeliness, swallow function, diet tolerance and swallow safety in order to  improve swallow safety, work toward diet advancement and decrease aspiration risk. REHABILITATION POTENTIAL FOR STATED GOALS: Good    PLAN    FREQUENCY/DURATION: Continue to follow patient 3 times a week for duration of hospital stay to address above goals. Recommendations for next treatment session: Next treatment session will address diet tolerance and po trials    SUBJECTIVE   Awake in bed holding emesis bag. Reports \"vertigo\" and reports earlier felt like was going to throw up but didn't   dobhoff now removed. Reports ate some grits for breakfast.      Oxygen Device: Room air. Pain: Pain Scale 1: Numeric (0 - 10)  Pain Intensity 1: 0    History of Present Injury/Illness: Ms. Meliza Cazares  has a past medical history of Endocrine disease, Hypertension, and Other ill-defined conditions(589.89). . She also  has no past surgical history on file. PRECAUTIONS/ALLERGIES: Patient has no known allergies. Problem List:  (Impairments causing functional limitations):  1. Oropharyngeal dysphagia   OBJECTIVE   Dysphagia treatment(diet tolerance, po trials)  Dentures still not present. Patient accepted mildly thick liquids via straw ~5 trials and ~3 bites of puree then declined further trials due to nausea. Adequate acceptance, clearance and no overt s/sx aspiration. Vocal quality clear.          Tool Used: Dysphagia Outcome and Severity Scale (KEVIN)    Score Comments   Normal Diet  [] 7 With no strategies or extra time needed   Functional Swallow  [] 6 May have mild oral or pharyngeal delay   Mild Dysphagia  [] 5 Which may require one diet consistency restricted    Mild-Moderate Dysphagia  [x] 4 With 1-2 diet consistencies restricted   Moderate Dysphagia  [] 3 With 2 or more diet consistencies restricted Moderate-Severe Dysphagia  [] 2 With partial PO strategies (trials with ST only)   Severe Dysphagia  [] 1 With inability to tolerate any PO safely      Score:  Initial: 3 Most Recent: 4 (Date 10/19/21 )   Interpretation of Tool: The Dysphagia Outcome and Severity Scale (KEVIN) is a simple, easy-to-use, 7-point scale developed to systematically rate the functional severity of dysphagia based on objective assessment and make recommendations for diet level, independence level, and type of nutrition.      INTERDISCIPLINARY COLLABORATION: n/a    After treatment position/precautions:  · Upright in bed  · Call light within reach    Total Treatment Duration:   Time In: 0945  Time Out: [address], [facility] Gerald STOKES

## 2021-10-19 NOTE — PROGRESS NOTES
Hospitalist Progress Note   Admit Date:  10/3/2021  2:16 AM   Name:  Nate López   Age:  78 y.o. Sex:  female  :  1940   MRN:  [id number]   Room:  [facility]/    Presenting Complaint: Headache    Reason(s) for Admission: Intracranial bleed ([facility] [I62.9]  Dementia ([facility] [address] [F03.90]  Poorly-controlled hypertension [I10]  Hypothyroidism [E03.9]  Encephalopathy, unspecified [G93.40]     Hospital Course & Interval History:     Patient with past medical history of  Hypertension  Dementia  Hypothyroidism    Patient presented to ER on October 3 due to headache and confusion. CT head showed right temporal IPH. 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 Neurosurgery evaluated the patient and did not feel that the bleeding was related to aneurysm. Patient was admitted to ICU. On October 3, code as was called when she developed symptoms related to increased intracranial pressure. She was given mannitol, and Cardene. She developed intermittent delirium and agitation. She started on Depakote and Seroquel. She was encephalopathic for many days since admission. She started to be more alert, more cooperative with physical therapy recently. Subjective (10/19/21):  2021  Patient is lying quietly in bed. Not answering question. Not appearing to be in overt distress. No fever. 2021  Patient is lying in bed, no response to verbal stimuli. .   NG tube was successfully inserted. Patient is getting feeding through it. 2021  Reportedly patient seems more alert, more responsive verbally. She is more able to participate in physical therapy yesterday. At one point speech therapist signed her off because she was not participating in sessions enough. I have discussed with her daughter yesterday on the phone.   She indicated to me that if the patient required PEG tube placement, she would allow that. October 15, 2021  Patient is alert. Following commands. No fever. No shortness of breath    October 16, 2021  Patient was found to be able to have some oral intake from swallowing study. She is allowed puréed diet now. Afebrile. October 17, 2021  Patient is alert, oriented. Talking well. Answering questions well. Eating better. October 18, 2021  NG tube is found to be coiled in hiatal hernia area. Patient has not been fed through the feeding tube. She is able to eat by mouth. She denies abdominal pain. Denies shortness of breath. She denies chest pain. No diarrhea. No vomiting. No blood per rectum. No fever. October 19, 2021  Patient is lying in bed, talking well. Patient could take food by mouth. NG tube was removed. Assessment & Plan:     Principal Problem:    Hemorrhage of right temporal lobe ([facility] [address] (10/10/2021)  With several subsequent CT of the brain, showing stable hemorrhage. Avoid antiplatelet, anticoagulants. Continue physical therapy. CTA showed aneurysm but not in the area of her hemorrhage. Not a candidate for surgical intervention. Patient is well alert now. Well oriented. Answering questions appropriately. Following commands now. No encephalopathy. Able to swallow. Continue with puree diet. We will continue to work on her eating by mouth. NG tube is removed. Active Problems:    Hypothyroidism (6/29/2012)  On supplemental levothyroxine      Dementia ([facility] [address] (10/3/2021)        Poorly-controlled hypertension (10/3/2021)  On amlodipine, carvedilol, clonidine patch      LEODAN (acute kidney injury) ([facility] [address] (10/10/2021)  Improved creatinine. Monitor renal function and intake and output. Avoid nephrotoxic agents. Renal function continues to improve. Acute metabolic encephalopathy (76/44/9174)  From intracranial hemorrhage. Resolved. Pleural effusion, bilateral (10/11/2021)  Small. Monitor.        Chronic diastolic congestive heart failure ([facility] (10/11/2021)  No decompensation. Monitor. PAF (paroxysmal atrial fibrillation) ([facility] (10/11/2021)  Not a candidate for anticoagulation        Dispo/Discharge Planning:    Pending insurance approval for rehab facility. Diet:  ADULT TUBE FEEDING Nasogastric; Standard with Fiber; Delivery Method: Continuous; Continuous Initial Rate (mL/hr): 10; Continuous Advance Tube Feeding: Yes; Advancement Volume (mL/hr): 10; Advancement Frequency: Other (Specify); Specify Other Adva. ..   ADULT DIET Dysphagia - Pureed; Mildly Thick (Nectar); single straw sip; 1:1 assistance  ADULT ORAL NUTRITION SUPPLEMENT Lunch, Dinner; Frozen Supplement  ADULT ORAL NUTRITION SUPPLEMENT Breakfast, Lunch, Dinner; Renal Supplement  DVT PPx: SCD   Code status: Full Code         Hospital Problems as of 10/19/2021 Never Reviewed        Codes Class Noted - Resolved POA    Pleural effusion, bilateral ICD-10-CM: J90  ICD-9-CM: 511.9  10/11/2021 - Present Yes        Chronic diastolic congestive heart failure (HCC) ICD-10-CM: I50.32  ICD-9-CM: 428.32, 428.0  10/11/2021 - Present Yes        PAF (paroxysmal atrial fibrillation) (HCC) ICD-10-CM: I48.0  ICD-9-CM: 427.31  10/11/2021 - Present Yes        LEODAN (acute kidney injury) ([facility] ICD-10-CM: N17.9  ICD-9-CM: 584.9  10/10/2021 - Present No        Acute metabolic encephalopathy MKE-15-SR: G93.41  ICD-9-CM: 348.31  10/10/2021 - Present Yes        * (Principal) Hemorrhage of right temporal lobe ([facility] ICD-10-CM: I61.1  ICD-9-CM: 272  10/10/2021 - Present Yes        Dementia ([facility] ICD-10-CM: F03.90  ICD-9-CM: 294.20  10/3/2021 - Present Yes        Poorly-controlled hypertension ICD-10-CM: I10  ICD-9-CM: 401.9  10/3/2021 - Present Yes        Hypothyroidism ICD-10-CM: E03.9  ICD-9-CM: 244.9  6/29/2012 - Present Yes        RESOLVED: Acute pulmonary edema with congestive heart failure ([facility] ICD-10-CM: I50.1  ICD-9-CM: 428.1  10/11/2021 - 10/11/2021 Clinically Undetermined        RESOLVED: Atrial fibrillation with RVR ([facility] [address] ICD-10-CM: I48.91  ICD-9-CM: 427.31  10/10/2021 - 10/11/2021 Clinically Undetermined        RESOLVED: UTI (urinary tract infection) ICD-10-CM: N39.0  ICD-9-CM: 599.0  7/2/2012 - 10/11/2021 Yes              Objective:     Patient Vitals for the past 24 hrs:   Temp Pulse Resp BP SpO2   10/19/21 0519 98.2 °F (36.8 °C) (!) 59 18 (!) 141/65 100 %   10/19/21 0000 98 °F (36.7 °C) (!) 57 18 (!) 144/68 97 %   10/18/21 2000 97.6 °F (36.4 °C) (!) 57 18 123/65 97 %   10/18/21 1600 97.9 °F (36.6 °C) (!) 53 17 126/73 97 %   10/18/21 1200 96.9 °F (36.1 °C) (!) 51 16 117/67 97 %     Oxygen Therapy  O2 Sat (%): 100 % (10/19/21 0519)  Pulse via Oximetry: 78 beats per minute (10/12/21 1352)  O2 Device: None (Room air) (10/16/21 1950)  Skin Assessment: Other (see comment/note) (no breakdown; mucosa dry) (10/09/21 1900)  Skin Protection for O2 Device: N/A (10/03/21 2001)  O2 Flow Rate (L/min): 3 l/min (10/14/21 1920)  O2 Temperature: 35.6 °F (2 °C) (10/09/21 0705)    Estimated body mass index is 29.92 kg/m² as calculated from the following:    Height as of this encounter: 5' 5\" (1.651 m). Weight as of this encounter: 81.6 kg (179 lb 12.8 oz). Intake/Output Summary (Last 24 hours) at 10/19/2021 1059  Last data filed at 10/18/2021 1824  Gross per 24 hour   Intake    Output 550 ml   Net -550 ml         Physical Exam:     Visit Vitals  BP (!) 141/65 (BP 1 Location: Left upper arm, BP Patient Position: At rest)   Pulse (!) 59   Temp 98.2 °F (36.8 °C)   Resp 18   Ht 5' 5\" (1.651 m)   Wt 81.6 kg (179 lb 12.8 oz)   SpO2 100%   BMI 29.92 kg/m²        Blood pressure (!) 141/65, pulse (!) 59, temperature 98.2 °F (36.8 °C), resp. rate 18, height 5' 5\" (1.651 m), weight 81.6 kg (179 lb 12.8 oz), SpO2 100 %. General:    Well-nourished.   lying in bed. Fully alert . Following commands well.   Answering simple questions well.   head:  Normocephalic, atraumatic, pale, no jaundice  Eyes:  Sclerae appear normal.  Pupils equally round. ENT:  Nares appear normal, no drainage. Moist oral mucosa  Neck:  No restricted ROM. Trachea midline   CV:   RRR. No m/r/g. No jugular venous distension. Lungs:   CTAB. No wheezing, rhonchi, or rales. Respirations even, unlabored  Abdomen: Bowel sounds present. Soft, nontender, nondistended. Extremities: No cyanosis or clubbing. No edema. Able to move all extremities. Skin:     No rashes and normal coloration. Warm and dry. Neuro:  CN II-XII grossly intact. moving all extremities following commands.        I have reviewed ordered lab tests and independently visualized imaging below:    Recent Labs:  Recent Results (from the past 48 hour(s))   PLEASE READ & DOCUMENT PPD TEST IN 48 HRS    Collection Time: 10/17/21  4:43 PM   Result Value Ref Range    PPD Negative Negative    mm Induration 0 0 - 5 mm   METABOLIC PANEL, BASIC    Collection Time: 10/18/21  5:39 AM   Result Value Ref Range    Sodium 137 136 - 145 mmol/L    Potassium 4.7 3.5 - 5.1 mmol/L    Chloride 102 98 - 107 mmol/L    CO2 32 21 - 32 mmol/L    Anion gap 3 (L) 7 - 16 mmol/L    Glucose 96 65 - 100 mg/dL    BUN 28 (H) 8 - 23 MG/DL    Creatinine 1.25 (H) 0.6 - 1.0 MG/DL    GFR est AA 53 (L) >60 ml/min/1.73m2    GFR est non-AA 44 (L) >60 ml/min/1.73m2    Calcium 9.0 8.3 - 10.4 MG/DL   MAGNESIUM    Collection Time: 10/18/21  5:39 AM   Result Value Ref Range    Magnesium 2.0 1.8 - 2.4 mg/dL   PHOSPHORUS    Collection Time: 10/18/21  5:39 AM   Result Value Ref Range    Phosphorus 3.1 2.3 - 3.7 MG/DL   METABOLIC PANEL, BASIC    Collection Time: 10/19/21  6:03 AM   Result Value Ref Range    Sodium 138 136 - 145 mmol/L    Potassium 4.5 3.5 - 5.1 mmol/L    Chloride 105 98 - 107 mmol/L    CO2 31 21 - 32 mmol/L    Anion gap 2 (L) 7 - 16 mmol/L    Glucose 90 65 - 100 mg/dL    BUN 29 (H) 8 - 23 MG/DL    Creatinine 1.29 (H) 0.6 - 1.0 MG/DL    GFR est AA 51 (L) >60 ml/min/1.73m2    GFR est non-AA 42 (L) >60 ml/min/1.73m2    Calcium 8.8 8.3 - 10.4 MG/DL       All Micro Results     Procedure Component Value Units Date/Time    COVID-19 RAPID TEST [[id number]] Collected: 10/12/21 0926    Order Status: Completed Specimen: Nasopharyngeal Updated: 10/12/21 1014     Specimen source NASAL        COVID-19 rapid test Not detected        Comment:      The specimen is NEGATIVE for SARS-CoV-2, the novel coronavirus associated with COVID-19. A negative result does not rule out COVID-19. This test has been authorized by the FDA under an Emergency Use Authorization (EUA) for use by authorized laboratories. Fact sheet for Healthcare Providers: [employer]  Fact sheet for Patients: [employer]       Methodology: Isothermal Nucleic Acid Amplification         CULTURE, URINE [[id number]]  (Abnormal)  (Susceptibility) Collected: 10/04/21 2107    Order Status: Completed Specimen: Cath Urine Updated: 10/07/21 0763     Special Requests: NO SPECIAL REQUESTS        Culture result:       >100,000 COLONIES/mL KLEBSIELLA PNEUMONIAE                Other Studies:  No results found.     Current Meds:  Current Facility-Administered Medications   Medication Dose Route Frequency    carvediloL (COREG) tablet 6.25 mg  6.25 mg Oral BID WITH MEALS    amLODIPine (NORVASC) tablet 10 mg  10 mg Oral DAILY    polyethylene glycol (MIRALAX) packet 17 g  17 g Oral DAILY    senna-docusate (PERICOLACE) 8.6-50 mg per tablet 2 Tablet  2 Tablet Oral DAILY    levothyroxine (SYNTHROID) tablet 150 mcg  150 mcg Oral ACB    escitalopram oxalate (LEXAPRO) 5 mg/5 mL oral solution soln 10 mg  10 mg Oral DAILY    magic mouthwash (RAHEEM) oral suspension 10 mL  10 mL Oral Q6H    meclizine (ANTIVERT) tablet 25 mg  25 mg Oral TID PRN    ondansetron (ZOFRAN) injection 8 mg  8 mg IntraVENous Q6H PRN    nystatin (MYCOSTATIN) 100,000 unit/gram powder   Topical BID    NUTRITIONAL SUPPORT ELECTROLYTE PRN ORDERS   Does Not Apply PRN    cloNIDine (CATAPRES) 0.3 mg/24 hr patch 1 Patch  1 Patch TransDERmal Q7D    hydrALAZINE (APRESOLINE) 20 mg/mL injection 15 mg  15 mg IntraVENous Q4H PRN    central line flush (saline) syringe 10 mL  10 mL InterCATHeter Q8H    [Held by provider] furosemide (LASIX) injection 40 mg  40 mg IntraVENous BID    [Held by provider] QUEtiapine (SEROquel) tablet 12.5 mg  12.5 mg Oral QHS    acetaminophen (TYLENOL) tablet 650 mg  650 mg Oral Q4H PRN    [Held by provider] levETIRAcetam (KEPPRA) 500 mg in 0.9% sodium chloride (MBP/ADV) 100 mL MBP  500 mg IntraVENous Q12H    [Held by provider] divalproex (DEPAKOTE SPRINKLE) capsule 125 mg  125 mg Oral Q12H    LORazepam (ATIVAN) injection 1 mg  1 mg IntraVENous Q4H PRN    sodium chloride (NS) flush 5-40 mL  5-40 mL IntraVENous PRN       Signed:  Akosua Floyd MD    Part of this note may have been written by using a voice dictation software. The note has been proof read but may still contain some grammatical/other typographical errors.

## 2021-10-19 NOTE — PROGRESS NOTES
10/18/21 1900   NIH Stroke Scale   Interval Handoff/Transfer  (caitlyn issa)   Level of Conciousness (1a) 0   LOC Questions (1b) (!) 2   LOC Commands (1c) 0   Best Gaze (2) 0   Visual (3) 0   Facial Palsy (4) 0   Motor Arm, Left (5a) 0   Motor Arm, Right (5b) 0   Motor Leg, Left (6a) (!) 2   Motor Leg, Right (6b) (!) 2   Limb Ataxia (7) 0   Sensory (8) 0   Best Language (9) 0   Dysarthria (10) 0   Extinction and Inattention (11) 0   Total 6

## 2021-10-19 NOTE — PROGRESS NOTES
ACUTE PHYSICAL THERAPY GOALS:  (Developed with and agreed upon by patient and/or caregiver.)  1. Patient will perform bed mobility with MODERATE ASSISTANCE within 3 days. MET 10/13/21  2. Patient will transfer sit to stand with MODERATE ASSISTANCE  3. Patient will transfer from bed to chair with MAXIMAL ASSISTANCE within 3 days. 4. Patient will demonstrate GOOD STATIC STANDING balance within 3 day(s). 5. Patient will tolerate 15+ minutes of therapeutic activity/exercise and/or neuromuscular re-education while maintaining stable vitals to improve functional strength and activity tolerance within 3 days. MET 10/13/21     LT. Patient will perform bed mobility with MINIMAL ASSISTANCE within 7 days. 2. Patient will transfer bed to chair with MINIMAL ASSISTANCE within 7 days. 3. Patient will demonstrate GOOD DYNAMIC SITTING balance within 7 day(s). 4. Patient will ambulate 50ft+ using least restrictive assistive device and MINIMAL ASSISTANCE within 7 days. 5. Patient will tolerate 25+ minutes of therapeutic activity/exercise and/or neuromuscular re-education while maintaining stable vitals to improve functional strength and activity tolerance within 7 days.     PHYSICAL THERAPY: Daily Note and PM Treatment Day # 6    Portia Shankar is a 78 y.o. female   PRIMARY DIAGNOSIS: Hemorrhage of right temporal lobe (HCC)  Intracranial bleed ([facility] [address] [I62.9]  Dementia ([facility] [address] [F03.90]  Poorly-controlled hypertension [I10]  Hypothyroidism [E03.9]  Encephalopathy, unspecified [G93.40]  Procedure(s) (LRB):  ESOPHAGOGASTRODUODENOSCOPY (EGD) with NG tube placement Room 715 (N/A)  7 Days Post-Op    ASSESSMENT:     REHAB RECOMMENDATIONS: CURRENT LEVEL OF FUNCTION:  (Most Recently Demonstrated)   Recommendation to date pending progress:  Setting:   Short-term Rehab  Equipment:    To Be Determined Bed Mobility:   Moderate Assistance  Sit to Stand:   Moderate Assistance x 2  Transfers:   Moderate Assistance x 2  Gait/Mobility:   Not tested     ASSESSMENT:  Ms. Dasha Carr is an 78year old female admitted with AMS and found to have right intraparenchymal hemorrhage and SDH. Patient seen this PM for PT treatment session. Patient presents supine and agrees to therapy. She started moving supine to sit however is limited due to her nausea. She rests and then attempts further mobility. She also performed ankle pumps and a few LAQ. She got so far and then had to return supine due to the nausea. Assisted pt to position of comfort and left with needs in reach. No real progress. Continue to recommend STR at discharge. Of note, patient and spouse endorse extensive peripheral vertigo history and baseline taking meclizine. Patient states, \"this feels like my vertigo. \" (Primary RN notified.) No nystagmus appreciated; patient too symptomatic for formal vestibular assessment on this date. (Appreciate nausea in setting of NGT and central hemorrhage as well.)     SUBJECTIVE:   Ms. Dasha Carr states, \"I feel sick. \"    SOCIAL HISTORY/ LIVING ENVIRONMENT: Ambulatory with a SPC and spouse assists with ADLs PRN. Support Systems: Spouse/Significant Other Cayetano Epley [phone] (spouse))  OBJECTIVE:     PAIN: VITAL SIGNS: LINES/DRAINS:   Pre Treatment:    Post Treatment: FLACC unchanged 3; patient continues to c/o abdominal pain and nausea.     Re[place]aga Catheter, IV and Nasogastric Tube  O2 Device: None (Room air)     MOBILITY: I Mod I S SBA CGA Min Mod Max Total  NT x2 Comments:   Bed Mobility    Rolling [] [] [] [] [] [] [] [] [] [x] []    Supine to Sit [] [] [] [] [] [x] [x] [] [] [] [x]    Scooting [] [] [] [] [] [x] [] [] [] [] [x]    Sit to Supine [] [] [] [] [] [] [x] [] [] [x] []    Transfers    Sit to Stand [] [] [] [] [] [] [] [] [] [x] []    Bed to Chair [] [] [] [] [] [] [] [] [] [x] []    Stand to Sit [] [] [] [] [] [] [] [] [] [x] []    I=Independent, Mod I=Modified Independent, S=Supervision, SBA=Standby Assistance, CGA=Contact Guard Assistance, Min=Minimal Assistance, Mod=Moderate Assistance, Max=Maximal Assistance, Total=Total Assistance, NT=Not Tested    BALANCE: Good Fair+ Fair Fair- Poor NT Comments   Sitting Static [] [x] [] [] [] []    Sitting Dynamic [] [] [x] [] [] []              Standing Static [] [] [] [] [] [x]    Standing Dynamic [] [] [] [] [] []      GAIT: I Mod I S SBA CGA Min Mod Max Total  NT x2 Comments:   Level of Assistance [] [] [] [] [] [] [] [] [] [x] []    Distance N/A    DME N/A    Gait Quality N/A    Weightbearing  Status N/A     I=Independent, Mod I=Modified Independent, S=Supervision, SBA=Standby Assistance, CGA=Contact Guard Assistance,   Min=Minimal Assistance, Mod=Moderate Assistance, Max=Maximal Assistance, Total=Total Assistance, NT=Not Tested    PLAN:   FREQUENCY/DURATION: PT Plan of Care: 3 times/week for duration of hospital stay or until stated goals are met, whichever comes first.  TREATMENT:     TREATMENT:   ($$ Therapeutic Exercises: 8-22 mins    )  Therapeutic Exercise (15 Minutes): Therapeutic exercises noted below to improve functional activity tolerance, AROM, strength and mobility. At this time, patient is appropriate for Co-treatment with occupational therapy due to patient's decreased overall endurance/tolerance levels, as well as need for high level skilled assistance to complete functional transfers/mobility and functional tasks. Shara Ahumada is appropriate for a multidisciplinary co-treatment of PT and OT to address goals of both disciplines.       TREATMENT GRID:  N/A    AFTER TREATMENT POSITION/PRECAUTIONS:  Alarm Activated, Bed, Needs within reach, RN notified and RN updated on patient status/progress/nausea    INTERDISCIPLINARY COLLABORATION:  RN/PCT, PT/PTA and OT/ALMEIDA    TOTAL TREATMENT DURATION:  PT Patient Time In/Time Out  Time In: 1335  Time Out: [address], PTA

## 2021-10-19 NOTE — PROGRESS NOTES
10/19/21 0653   NIH Stroke Scale   Interval Handoff/Transfer   Level of Conciousness (1a) 0   LOC Questions (1b) (!) 2   LOC Commands (1c) 0   Best Gaze (2) 0   Visual (3) 0   Facial Palsy (4) 0   Motor Arm, Left (5a) 0   Motor Arm, Right (5b) 0   Motor Leg, Left (6a) (!) 2   Motor Leg, Right (6b) (!) 2   Limb Ataxia (7) 0   Sensory (8) 0   Best Language (9) 0   Dysarthria (10) 0   Extinction and Inattention (11) 0   Total 6

## 2021-10-19 NOTE — PROGRESS NOTES
10/19/21 7057   NIH Stroke Scale   Interval Handoff/Transfer  (Yuriy MCCRAY)   Level of Conciousness (1a) 0   LOC Questions (1b) (!) 2   LOC Commands (1c) 0   Best Gaze (2) 0   Visual (3) 0   Facial Palsy (4) 0   Motor Arm, Left (5a) 0   Motor Arm, Right (5b) 0   Motor Leg, Left (6a) (!) 2   Motor Leg, Right (6b) (!) 2   Limb Ataxia (7) 0   Sensory (8) 0   Best Language (9) 0   Dysarthria (10) 0   Extinction and Inattention (11) 0   Total 6

## 2021-10-19 NOTE — PROGRESS NOTES
ACUTE OT GOALS:  (Developed with and agreed upon by patient and/or caregiver.)  1. Patient will follow 75% of one step verbal or visual commands to increase participation during ADL performance. 2. Patient will tolerate 15 minutes static sitting balance unsupported with CGA while completing functional activity. 3. Patient will tolerate 25  minutes of OT treatment with 2-3 rest breaks to increase activity tolerance for ADLs. 4. Patient will complete functional transfers with CGA and adaptive equipment as needed. 5. Patient will complete upper body bathing and dressing with SBA and adaptive equipment as needed. 6. Patient will complete self-grooming with SBA and adaptive equipment as needed. 7. Patient will tolerate 30 minutes BUE therapeutic exercises to increase functional use during ADL performance.     Timeframe: 7 visits     OCCUPATIONAL THERAPY: Daily Note OT Treatment Day # 7    Lakeisha Osborne is a 78 y.o. female   PRIMARY DIAGNOSIS: Hemorrhage of right temporal lobe (HCC)  Intracranial bleed ([facility] [address] [I62.9]  Dementia ([facility] [address] [F03.90]  Poorly-controlled hypertension [I10]  Hypothyroidism [E03.9]  Encephalopathy, unspecified [G93.40]  Procedure(s) (LRB):  ESOPHAGOGASTRODUODENOSCOPY (EGD) with NG tube placement Room 715 (N/A)  7 Days Post-Op  Payor: [employer] / Plan: Damion Guaman / Product Type: Managed Care Medicare /   ASSESSMENT:     REHAB RECOMMENDATIONS: CURRENT LEVEL OF FUNCTION:  (Most Recently Demonstrated)   Recommendation to date pending progress:  Setting:   Short-term Rehab  Equipment:    To Be Determined Bathing:   Not tested  Dressing:   Not tested  Feeding/Grooming:   Maximal Assistance combing hair  Toileting:   Not tested  Functional Mobility:   Moderate Assistance x 2     ASSESSMENT:  Ms. Zachary Estrada is doing fair today. Pt presents supine in bed. Pt required mod A x2 for bed mobility this session. Pt c/o nausea and vertigo sensation.  Pt was unable to erect from bed even with assistance from raising [address]. Provided cool washcloth without success. Nausea did not subside so pt returned to supine with same level of assistance. Max A for hair combing this session. No further activities completed this session due to nausea/vomiting. Left supine with all needs in reach. Making some progress towards goals this session. Pt would continue to benefit from skilled OT during stay. SUBJECTIVE:   Ms. Jayce Vallejo states \"I feel sick again\".     SOCIAL HISTORY/LIVING ENVIRONMENT:   Support Systems: Spouse/Significant Other Bipin Hillman [phone] (spouse))    OBJECTIVE:     PAIN: VITAL SIGNS: LINES/DRAINS:   Pre Treatment: Pain Screen  Pain Scale 1: Numeric (0 - 10)  Pain Intensity 1: 0  Post Treatment: 0   IV and Purewick  O2 Device: None (Room air)     ACTIVITIES OF DAILY LIVING: I Mod I S SBA CGA Min Mod Max Total NT Comments   BASIC ADLs:              Bathing/ Showering [] [] [] [] [] [] [] [] [] [x]    Toileting [] [] [] [] [] [] [] [] [] [x]    Dressing [] [] [] [] [] [] [] [] [] [x]    Feeding [] [] [] [] [] [] [] [] [] [x]    Grooming [] [] [] [] [] [] [] [x] [] []    Personal Device Care [] [] [] [] [] [] [] [] [] [x]    Functional Mobility [] [] [] [] [] [] [x] [] [] [] x2   I=Independent, Mod I=Modified Independent, S=Supervision, SBA=Standby Assistance, CGA=Contact Guard Assistance,   Min=Minimal Assistance, Mod=Moderate Assistance, Max=Maximal Assistance, Total=Total Assistance, NT=Not Tested    MOBILITY: I Mod I S SBA CGA Min Mod Max Total  NT x2 Comments:   Supine to sit [] [] [] [] [] [] [x] [] [] [] [x]    Sit to supine [] [] [] [] [] [] [x] [] [] [] [x]    Sit to stand [] [] [] [] [] [] [] [] [] [x] []    Bed to chair [] [] [] [] [] [] [] [] [] [x] []    I=Independent, Mod I=Modified Independent, S=Supervision, SBA=Standby Assistance, CGA=Contact Guard Assistance,   Min=Minimal Assistance, Mod=Moderate Assistance, Max=Maximal Assistance, Total=Total Assistance, NT=Not Tested    BALANCE: Good Fair+ Fair Fair- Poor NT Comments   Sitting Static [] [] [x] [] [] []    Sitting Dynamic [] [] [x] [x] [] []              Standing Static [] [] [] [] [] [x]    Standing Dynamic [] [] [] [] [] [x]      PLAN:   FREQUENCY/DURATION: OT Plan of Care: 3 times/week for duration of hospital stay or until stated goals are met, whichever comes first.    TREATMENT:   TREATMENT:   (     )  Co-Treatment PT/OT necessary due to patient's decreased overall endurance/tolerance levels, as well as need for high level skilled assistance to complete functional transfers/mobility and functional tasks  Therapeutic Activity (25 Minutes): Therapeutic activity included Rolling, Supine to Sit, Sit to Supine and Sitting balance  to improve functional Mobility, Strength and Activity tolerance.     TREATMENT GRID:  N/A    AFTER TREATMENT POSITION/PRECAUTIONS:  Alarm Activated, Bed, Needs within reach and Visitors at bedside    INTERDISCIPLINARY COLLABORATION:  RN/PCT, PT/PTA and OT/ALMEIDA    TOTAL TREATMENT DURATION:  OT Patient Time In/Time Out  Time In: 1335  Time Out: [address], OTAS

## 2021-10-20 LAB
ANION GAP SERPL CALC-SCNC: 2 MMOL/L (ref 7–16)
BUN SERPL-MCNC: 30 MG/DL (ref 8–23)
CALCIUM SERPL-MCNC: 8.6 MG/DL (ref 8.3–10.4)
CHLORIDE SERPL-SCNC: 106 MMOL/L (ref 98–107)
CO2 SERPL-SCNC: 31 MMOL/L (ref 21–32)
CREAT SERPL-MCNC: 1.38 MG/DL (ref 0.6–1)
GLUCOSE SERPL-MCNC: 78 MG/DL (ref 65–100)
MAGNESIUM SERPL-MCNC: 2.1 MG/DL (ref 1.8–2.4)
PHOSPHATE SERPL-MCNC: 3 MG/DL (ref 2.3–3.7)
POTASSIUM SERPL-SCNC: 4.5 MMOL/L (ref 3.5–5.1)
SODIUM SERPL-SCNC: 139 MMOL/L (ref 136–145)

## 2021-10-20 PROCEDURE — 97535 SELF CARE MNGMENT TRAINING: CPT

## 2021-10-20 PROCEDURE — 80048 BASIC METABOLIC PNL TOTAL CA: CPT

## 2021-10-20 PROCEDURE — 83735 ASSAY OF MAGNESIUM: CPT

## 2021-10-20 PROCEDURE — 97112 NEUROMUSCULAR REEDUCATION: CPT

## 2021-10-20 PROCEDURE — 97168 OT RE-EVAL EST PLAN CARE: CPT

## 2021-10-20 PROCEDURE — 65270000029 HC RM PRIVATE

## 2021-10-20 PROCEDURE — 84100 ASSAY OF PHOSPHORUS: CPT

## 2021-10-20 PROCEDURE — 74011250637 HC RX REV CODE- 250/637: Performed by: STUDENT IN AN ORGANIZED HEALTH CARE EDUCATION/TRAINING PROGRAM

## 2021-10-20 PROCEDURE — 74011000258 HC RX REV CODE- 258: Performed by: STUDENT IN AN ORGANIZED HEALTH CARE EDUCATION/TRAINING PROGRAM

## 2021-10-20 PROCEDURE — 97530 THERAPEUTIC ACTIVITIES: CPT

## 2021-10-20 PROCEDURE — 74011250636 HC RX REV CODE- 250/636: Performed by: STUDENT IN AN ORGANIZED HEALTH CARE EDUCATION/TRAINING PROGRAM

## 2021-10-20 PROCEDURE — 2709999900 HC NON-CHARGEABLE SUPPLY

## 2021-10-20 PROCEDURE — 97164 PT RE-EVAL EST PLAN CARE: CPT

## 2021-10-20 PROCEDURE — 74011250637 HC RX REV CODE- 250/637: Performed by: INTERNAL MEDICINE

## 2021-10-20 PROCEDURE — 74011250636 HC RX REV CODE- 250/636: Performed by: FAMILY MEDICINE

## 2021-10-20 RX ORDER — SODIUM CHLORIDE, SODIUM LACTATE, POTASSIUM CHLORIDE, CALCIUM CHLORIDE 600; 310; 30; 20 MG/100ML; MG/100ML; MG/100ML; MG/100ML
125 INJECTION, SOLUTION INTRAVENOUS CONTINUOUS
Status: DISCONTINUED | OUTPATIENT
Start: 2021-10-20 | End: 2021-10-22 | Stop reason: HOSPADM

## 2021-10-20 RX ADMIN — LEVETIRACETAM 500 MG: 100 INJECTION, SOLUTION INTRAVENOUS at 17:20

## 2021-10-20 RX ADMIN — DIVALPROEX SODIUM 125 MG: 125 CAPSULE ORAL at 21:36

## 2021-10-20 RX ADMIN — LEVOTHYROXINE SODIUM 150 MCG: 0.07 TABLET ORAL at 05:36

## 2021-10-20 RX ADMIN — LEVETIRACETAM 500 MG: 100 INJECTION, SOLUTION INTRAVENOUS at 05:36

## 2021-10-20 RX ADMIN — MECLIZINE 25 MG: 12.5 TABLET ORAL at 12:15

## 2021-10-20 RX ADMIN — SENNOSIDES AND DOCUSATE SODIUM 2 TABLET: 8.6; 5 TABLET ORAL at 08:14

## 2021-10-20 RX ADMIN — Medication 10 ML: at 05:36

## 2021-10-20 RX ADMIN — SODIUM CHLORIDE, SODIUM LACTATE, POTASSIUM CHLORIDE, AND CALCIUM CHLORIDE 125 ML/HR: 600; 310; 30; 20 INJECTION, SOLUTION INTRAVENOUS at 10:34

## 2021-10-20 RX ADMIN — NYSTATIN 10 ML: 100000 SUSPENSION ORAL at 00:26

## 2021-10-20 RX ADMIN — Medication 10 ML: at 21:36

## 2021-10-20 RX ADMIN — NYSTATIN: 100000 POWDER TOPICAL at 08:25

## 2021-10-20 RX ADMIN — ESCITALOPRAM OXALATE 10 MG: 5 SOLUTION ORAL at 08:14

## 2021-10-20 RX ADMIN — Medication 10 ML: at 17:25

## 2021-10-20 RX ADMIN — AMLODIPINE BESYLATE 10 MG: 10 TABLET ORAL at 08:14

## 2021-10-20 RX ADMIN — POLYETHYLENE GLYCOL 3350 17 G: 17 POWDER, FOR SOLUTION ORAL at 08:14

## 2021-10-20 RX ADMIN — DIVALPROEX SODIUM 125 MG: 125 CAPSULE ORAL at 08:14

## 2021-10-20 NOTE — PROGRESS NOTES
Secure email sent to CASTILLO Herron of [employer] to follow up on the status of the insurance auth request.  Awaiting a response. SW spoke with pt's spouse, at the bedside, during IDT rounds and provided update.

## 2021-10-20 NOTE — PROGRESS NOTES
Problem: Patient Education: Go to Patient Education Activity  Goal: Patient/Family Education  Outcome: Progressing Towards Goal     Problem: Hemorrhagic Stroke: Day 5 through Discharge  Goal: Off Pathway (Use only if patient is Off Pathway)  Outcome: Progressing Towards Goal  Goal: Activity/Safety  Outcome: Progressing Towards Goal  Goal: Consults, if ordered  Outcome: Progressing Towards Goal  Goal: Diagnostic Test/Procedures  Outcome: Progressing Towards Goal  Goal: Nutrition/Diet  Outcome: Progressing Towards Goal  Goal: Medications  Outcome: Progressing Towards Goal  Goal: Respiratory  Outcome: Progressing Towards Goal  Goal: Treatments/Interventions/Procedures  Outcome: Progressing Towards Goal  Goal: Psychosocial  Outcome: Progressing Towards Goal  Goal: *Hemodynamically stable  Outcome: Progressing Towards Goal  Goal: *Verbalizes anxiety and depression are reduced or absent  Outcome: Progressing Towards Goal  Goal: *Absence of aspiration  Outcome: Progressing Towards Goal  Goal: *Absence of signs and symptoms of DVT  Outcome: Progressing Towards Goal  Goal: *Optimal pain control at patient's stated goal  Outcome: Progressing Towards Goal  Goal: *Tolerating diet  Outcome: Progressing Towards Goal  Goal: *Progressive mobility and function  Outcome: Progressing Towards Goal  Goal: *Rehabilitation readiness  Outcome: Progressing Towards Goal     Problem: Hemorrhagic Stroke: Discharge Outcomes  Goal: *Verbalizes anxiety and depression are reduced or absent  Outcome: Progressing Towards Goal  Goal: *Verbalize understanding of risk factor modification(Stroke Metric)  Outcome: Progressing Towards Goal  Goal: *Optimal pain control at patient's stated goal  Outcome: Progressing Towards Goal  Goal: *Hemodynamically stable  Outcome: Progressing Towards Goal  Goal: *Absence of aspiration pneumonia  Outcome: Progressing Towards Goal  Goal: *Aware of needed dietary changes  Outcome: Progressing Towards Goal  Goal: *Verbalizes understanding and describes medication purposes and frequencies  Outcome: Progressing Towards Goal  Goal: *Tolerating diet  Outcome: Progressing Towards Goal  Goal: *Absence of signs and symptoms of DVT  Outcome: Progressing Towards Goal  Goal: *Absence of aspiration  Outcome: Progressing Towards Goal  Goal: *Progressive mobility and function  Outcome: Progressing Towards Goal  Goal: *Home safety concerns addressed  Outcome: Progressing Towards Goal     Problem: Falls - Risk of  Goal: *Absence of Falls  Description: Document Claire Fall Risk and appropriate interventions in the flowsheet.   Outcome: Progressing Towards Goal  Note: Fall Risk Interventions:  Mobility Interventions: Bed/chair exit alarm, OT consult for ADLs, Patient to call before getting OOB, PT Consult for mobility concerns    Mentation Interventions: Bed/chair exit alarm, Door open when patient unattended    Medication Interventions: Bed/chair exit alarm, Patient to call before getting OOB, Teach patient to arise slowly    Elimination Interventions: Bed/chair exit alarm, Call light in reach, Patient to call for help with toileting needs, Toileting schedule/hourly rounds    History of Falls Interventions: Bed/chair exit alarm, Consult care management for discharge planning, Door open when patient unattended         Problem: Patient Education: Go to Patient Education Activity  Goal: Patient/Family Education  Outcome: Progressing Towards Goal     Problem: Dysphagia (Adult)  Goal: *Speech Goal: (INSERT TEXT)  Outcome: Progressing Towards Goal     Problem: Patient Education: Go to Patient Education Activity  Goal: Patient/Family Education  Outcome: Progressing Towards Goal     Problem: Non-Violent Restraints  Goal: Removal from restraints as soon as assessed to be safe  Outcome: Progressing Towards Goal  Goal: No harm/injury to patient while restraints in use  Outcome: Progressing Towards Goal  Goal: Patient's dignity will be maintained  Outcome: Progressing Towards Goal  Goal: Patient Interventions  Outcome: Progressing Towards Goal     Problem: Pressure Injury - Risk of  Goal: *Prevention of pressure injury  Description: Document Romeo Scale and appropriate interventions in the flowsheet.   Outcome: Progressing Towards Goal     Problem: Patient Education: Go to Patient Education Activity  Goal: Patient/Family Education  Outcome: Progressing Towards Goal     Problem: Patient Education: Go to Patient Education Activity  Goal: Patient/Family Education  Outcome: Progressing Towards Goal     Problem: Delirium Treatment  Goal: *Level of consciousness restored to baseline  Outcome: Progressing Towards Goal  Goal: *Level of environmental perceptions restored to baseline  Outcome: Progressing Towards Goal  Goal: *Sensory perception restored to baseline  Outcome: Progressing Towards Goal  Goal: *Emotional stability restored to baseline  Outcome: Progressing Towards Goal  Goal: *Functional assessment restored to baseline  Outcome: Progressing Towards Goal  Goal: *Absence of falls  Outcome: Progressing Towards Goal  Goal: *Will remain free of delirium, CAM Score negative  Outcome: Progressing Towards Goal  Goal: *Cognitive status will be restored to baseline  Outcome: Progressing Towards Goal  Goal: Interventions  Outcome: Progressing Towards Goal     Problem: Patient Education: Go to Patient Education Activity  Goal: Patient/Family Education  Outcome: Progressing Towards Goal

## 2021-10-20 NOTE — PROGRESS NOTES
ACUTE OT GOALS:  (Developed with and agreed upon by patient and/or caregiver.)      NEW GOALS AT RE-EVALUATION 10/20/21  1. Patient will complete upper body bathing and dressing with SET UP and adaptive equipment as needed. 2. Patient will complete toileting with MIN A X1.   3. Patient will complete grooming ADL with SET UP.  4. Patient will tolerate 25 minutes of OT treatment with 1-2 rest breaks to increase activity tolerance for ADLs. 5. Patient will complete functional transfers with CGA X1 and adaptive equipment as needed. 6. Patient will tolerate 10 minutes BUE exercises to increase strength for safe, functional transfers.      Timeframe: 7 visits     OCCUPATIONAL THERAPY ASSESSMENT: Daily Note and Re-evaluation OT Treatment Day # 1    Tete Rand is a 78 y.o. female   PRIMARY DIAGNOSIS: Hemorrhage of right temporal lobe (HCC)  Intracranial bleed ([facility] [address] [I62.9]  Dementia ([facility] [address] [F03.90]  Poorly-controlled hypertension [I10]  Hypothyroidism [E03.9]  Encephalopathy, unspecified [G93.40]  Procedure(s) (LRB):  ESOPHAGOGASTRODUODENOSCOPY (EGD) with NG tube placement Room 715 (N/A)  8 Days Post-Op  Reason for Referral:   ICD-10: Treatment Diagnosis: Generalized Muscle Weakness (M62.81)  INPATIENT: Payor: Karan Lee / Plan: Taj Smith / Product Type: Managed Care Medicare /   ASSESSMENT:     REHAB RECOMMENDATIONS:   Recommendation to date pending progress:  Setting:   Short-term Rehab  Equipment:    None pt has all needs at home     PRIOR LEVEL OF FUNCTION:  (Prior to Hospitalization)  INITIAL/CURRENT LEVEL OF FUNCTION:  (Based on today's evaluation)   Bathing:   Modified Independent  Dressing:   Independent  Feeding/Grooming:   Independent  Toileting:   Modified Independent  Functional Mobility:   Modified Independent rollator Bathing:   Moderate Assistance  Dressing:   Moderate Assistance  Feeding/Grooming:   Minimal Assistance  Toileting:   Moderate Assistance  Functional Mobility:   Minimal Assistance x 2     ASSESSMENT:  Ms. Nikole Richardson continues to present with deficits in overall strength, activity tolerance, ADL performance and functional mobility. Admitted for intracranial bleed, no plans for surgical intervention per neurosurgery. Pt seen for re-evaluation today, 10/20/21 and doing much better than initial evaluation. Pt is currently on 3L HF NC and sats >90% throughout. Pt AAO x4 and stated her nausea is resolved after eating. Pt min A for bed mobility and min A x2 RW bed>chair. Pt required min verbal and tactile cues for RW management during transfer. Pt completed other grooming ADLs in chair in grid below. Pt required cues to keep eyes open throughout treatment. Pt previous goals no longer appropriate, goals updated for continuation of care. Pt would continue to benefit from skilled OT services during acute care stay. Continue POC.      SUBJECTIVE:   Ms. Nikole Richardson states, \"I think I'll sit up for a while\"    SOCIAL HISTORY/LIVING ENVIRONMENT: lives with , one level home, 2STE, tub shower w/ SC, rollator, RW and SPC, 2 recent falls  Support Systems: Spouse/Significant Other Johnathon Alcantara [phone] (spouse))    OBJECTIVE:     PAIN: VITAL SIGNS: LINES/DRAINS:   Pre Treatment: Pain Screen  Pain Scale 1: Numeric (0 - 10)  Pain Intensity 1: 0  Post Treatment: 0 Vital Signs  O2 Sat (%): 95 %  O2 Device: Hi flow nasal cannula  O2 Flow Rate (L/min): 3 l/min  IV, Purewick  O2 Device: Hi flow nasal cannula     GROSS EVALUATION:  BUEs Within Functional Limits Abnormal/ Functional Abnormal/ Non-Functional (see comments) Not Tested Comments:   AROM [x] [] [] []    PROM [x] [] [] []    Strength [] [x] [] [] Generally deconditioned   Balance [] [x] [] [] Min A x2 RW, fair+ sitting balance EOB   Posture [] [x] [] [] Forward head and rounded shoulders   Sensation [x] [] [] []    Coordination [] [x] [] [] Overshooting/undershooting   Tone [x] [] [] []    Edema [x] [] [] []    Activity Tolerance [] [x] [] [] Fatigues quickly with activity    [] [] [] []      COGNITION/  PERCEPTION: Intact Impaired   (see comments) Comments:   Orientation [x] []    Vision [] [x] Cues to keep eyes open throughout   Hearing [x] []    Judgment/ Insight [] [x]    Attention [x] []    Memory [x] []    Command Following [x] []    Emotional Regulation [x] []     [] []      ACTIVITIES OF DAILY LIVING: I Mod I S SBA CGA Min Mod Max Total NT Comments   BASIC ADLs:              Bathing/ Showering [] [] [] [] [] [] [] [] [] [x]    Toileting [] [] [] [] [] [] [] [] [] [x]    Dressing [] [] [] [] [] [] [] [] [] [x]    Feeding [] [] [] [] [] [] [] [] [] [x]    Grooming [] [] [x] [] [] [x] [] [] [] [] Set up combing hair in chair, min A brushing teeth in chair for sequencing/problem solving   Personal Device Care [] [] [] [] [] [] [] [] [] [x]    Functional Mobility [] [] [] [] [] [x] [] [] [] [] x2 RW   I=Independent, Mod I=Modified Independent, S=Supervision, SBA=Standby Assistance, CGA=Contact Guard Assistance,   Min=Minimal Assistance, Mod=Moderate Assistance, Max=Maximal Assistance, Total=Total Assistance, NT=Not Tested    MOBILITY: I Mod I S SBA CGA Min Mod Max Total  NT x2 Comments:   Supine to sit [] [] [] [] [] [x] [] [] [] [] []    Sit to supine [] [] [] [] [] [] [] [] [] [x] []    Sit to stand [] [] [] [] [] [x] [] [] [] [] [x] RW   Bed to chair [] [] [] [] [] [x] [] [] [] [] [x] RW   I=Independent, Mod I=Modified Independent, S=Supervision, SBA=Standby Assistance, CGA=Contact Guard Assistance,   Min=Minimal Assistance, Mod=Moderate Assistance, Max=Maximal Assistance, Total=Total Assistance, NT=Not [facility] 6 Clicks   Daily Activity Inpatient Short Form        How much help from another person does the patient currently need. .. Total A Lot A Little None   1. Putting on and taking off regular lower body clothing? [] 1   [x] 2   [] 3   [] 4   2. Bathing (including washing, rinsing, drying)? [] 1   [x] 2   [] 3   [] 4   3. Toileting, which includes using toilet, bedpan or urinal?   [] 1   [x] 2   [] 3   [] 4   4. Putting on and taking off regular upper body clothing? [] 1   [] 2   [x] 3   [] 4   5. Taking care of personal grooming such as brushing teeth? [] 1   [] 2   [x] 3   [] 4   6. Eating meals? [] 1   [] 2   [] 3   [x] 4   © 2007, Trustees of [facility], under license to [employer]. All rights reserved     Score:  Initial: 11 Most Recent: 16 (Date: 10/20/21 )   Interpretation of Tool:  Represents activities that are increasingly more difficult (i.e. Bed mobility, Transfers, Gait). PLAN:   FREQUENCY/DURATION: OT Plan of Care: 3 times/week for duration of hospital stay or until stated goals are met, whichever comes first.    PROBLEM LIST:   (Skilled intervention is medically necessary to address:)  1. Decreased ADL/Functional Activities  2. Decreased Activity Tolerance  3. Decreased Balance  4. Decreased Coordination  5. Decreased Gait Ability  6. Decreased Strength  7. Decreased Transfer Abilities   INTERVENTIONS PLANNED:   (Benefits and precautions of occupational therapy have been discussed with the patient.)  1. Self Care Training  2. Therapeutic Activity  3. Therapeutic Exercise/HEP  4. Neuromuscular Re-education  5. Education     TREATMENT:     EVALUATION: Re-Evaluation : (Untimed Charge)    TREATMENT:   ($$ Self Care/Home Management: 8-22 mins    )  Self Care (18 Minutes): Self care including Grooming, ADL Adaptive Equipment Training and functional transfers in prep for ADLs to increase independence and decrease level of assistance required.     TREATMENT GRID:  N/A    AFTER TREATMENT POSITION/PRECAUTIONS:  Alarm Activated, Chair, Needs within reach and RN notified    INTERDISCIPLINARY COLLABORATION:  RN/PCT, OT/ALMEIDA and Rehab Attendant     TOTAL TREATMENT DURATION:  OT Patient Time In/Time Out  Time In: 1408  Time Out: [address], OT

## 2021-10-20 NOTE — PROGRESS NOTES
The documentation for this period is being entered following the guidelines as defined in the [address] [place] [address] downtime policy by Ashutosh Haines RN.     Downtime from 0100 to 0226

## 2021-10-20 NOTE — ASSESSMENT & PLAN NOTE
Admission sCr 1.8 from presumed baseline ~ 1.4  -renal dosing as appropriate  -refer to nephrology at discharge

## 2021-10-20 NOTE — PROGRESS NOTES
ACUTE PHYSICAL THERAPY GOALS:  (Developed with and agreed upon by patient and/or caregiver. )    LTG: (Reviewed and updated 10/20/21)  1. Patient will perform bed mobility with CONTACT GUARD ASSISTANCE within 7 days. 2. Patient will transfer bed to chair with CONTACT GUARD ASSISTANCE within 7 days. 3. Patient will demonstrate GOOD DYNAMIC SITTING balance within 7 day(s). 4. Patient will ambulate 25ft+ using least restrictive assistive device and MINIMAL ASSISTANCE within 7 days. 5. Patient will tolerate 25+ minutes of therapeutic activity/exercise and/or neuromuscular re-education while maintaining stable vitals to improve functional strength and activity tolerance within 7 days. 6. Patient will perform standing activities and exercises for 5+ minutes to improve mobility and balance within 7 days.       PHYSICAL THERAPY ASSESSMENT: Daily Note, Re-evaluation and PM PT Treatment Day # 1      Tete Rand is a 78 y.o. female   PRIMARY DIAGNOSIS: Hemorrhage of right temporal lobe (HCC)  Intracranial bleed ([facility] [address] [I62.9]  Dementia ([facility] [address] [facility] [F03.90]  Poorly-controlled hypertension [I10]  Hypothyroidism [E03.9]  Encephalopathy, unspecified [G93.40]  Procedure(s) (LRB):  ESOPHAGOGASTRODUODENOSCOPY (EGD) with NG tube placement Room 715 (N/A)  8 Days Post-Op  Reason for Referral:    ICD-10: Treatment Diagnosis: Difficulty in walking, Not elsewhere classified (R26.2)  Unspecified Lack of Coordination (R27.9)  INPATIENT: Payor: Karan Lee / Plan: Taj Smith / Product Type: [employer] /     ASSESSMENT:     REHAB RECOMMENDATIONS:   Recommendation to date pending progress:  Setting:   Short-term Rehab  Equipment:    To Be Determined     PRIOR LEVEL OF FUNCTION:  (Prior to Hospitalization) INITIAL/CURRENT LEVEL OF FUNCTION:  (Most Recently Demonstrated)   Bed Mobility:   Unknown  Sit to Stand:   Unknown  Transfers:   Unknown  Gait/Mobility:   Unknown Bed Mobility:   Minimal Assistance x 2-moderate assistance x2  Sit to Stand:   Moderate Assistance x 2  Transfers:   Minimal Assistance x 2-moderate assistance x2  Gait/Mobility:   Minimal Assistance x 2     ASSESSMENT:  Ms. Aguilar Perez is an 78year old female who was originally admitted with AMS and found to have right intraparenchymal hemorrhage and SDH. She has been followed by PT during hospitalization and has made good progress towards original therapy goals, however has not met any goals at this point. She is currently needing min-mod assist of 1-2 for bed mobility and min-mod assist of 2 for transfers/ambulation x a few steps with RW. Treatment today focused on transfer training/technique, standing balance, and ambulation with cues for walker management, posture, sequencing, and safety. Limited by nausea during activity with history of vertigo per pt report. Goals have been updated today to show progress.       SUBJECTIVE:   Ms. Aguilar Perez states, \"I wanted him to scratch my leg\"    SOCIAL HISTORY/LIVING ENVIRONMENT:    Support Systems: Spouse/Significant Other Wiley Malone [phone] (spouse))  OBJECTIVE:     PAIN: VITAL SIGNS: LINES/DRAINS:   Pre Treatment: Pain Screen  Pain Scale 1: Numeric (0 - 10)  Pain Intensity 1: 0  Post Treatment: 0/10 Vital Signs  O2 Device: Hi flow nasal cannula IV and Purewick  O2 Device: Hi flow nasal cannula     GROSS EVALUATION:   Within Functional Limits Abnormal/ Functional Abnormal/ Non-Functional (see comments) Not Tested Comments:   AROM [] [x] [] []    PROM [] [x] [] []    Strength [] [x] [] []    Balance [] [x] [] []    Posture [] [x] [] []    Sensation [] [x] [] []    Coordination [] [x] [] []    Tone [] [x] [] []    Edema [] [] [] []    Activity Tolerance [] [x] [] []     [] [] [] []      COGNITION/  PERCEPTION: Intact Impaired   (see comments) Comments:   Orientation [] [x]    Vision [] [x]    Hearing [] [x]    Command Following [] [x]    Safety Awareness [] [x]     [] []      MOBILITY: I Mod I S SBA CGA Min Mod Max Total  NT x2 Comments:   Bed Mobility    Rolling [] [] [] [] [] [] [] [] [] [] []    Supine to Sit [] [] [] [] [] [] [] [] [] [] []    Scooting [] [] [] [] [] [x] [] [] [] [] [x]    Sit to Supine [] [] [] [] [] [x] [x] [] [] [] [x]    Transfers    Sit to Stand [] [] [] [] [] [x] [x] [] [] [] [x]    Bed to Chair [] [] [] [] [] [x] [x] [] [] [] [x]    Stand to Sit [] [] [] [] [] [x] [] [] [] [] [x]    I=Independent, Mod I=Modified Independent, S=Supervision, SBA=Standby Assistance, CGA=Contact Guard Assistance,   Min=Minimal Assistance, Mod=Moderate Assistance, Max=Maximal Assistance, Total=Total Assistance, NT=Not Tested  GAIT: I Mod I S SBA CGA Min Mod Max Total  NT x2 Comments:   Level of Assistance [] [] [] [] [] [] [] [] [] [] [x]    Distance 4'    DME Rolling Walker    Gait Quality Shuffling, forward flexed posture    Weightbearing Status N/A     I=Independent, Mod I=Modified Independent, S=Supervision, SBA=Standby Assistance, CGA=Contact Guard Assistance,   Min=Minimal Assistance, Mod=Moderate Assistance, Max=Maximal Assistance, Total=Total Assistance, NT=Not Tested    [address] [facility] [address] [address] [facility] [address] [facility] Short [facility]       How much difficulty does the patient currently have. .. Unable A Lot A Little None   1. Turning over in bed (including adjusting bedclothes, sheets and blankets)? [] 1   [] 2   [x] 3   [] 4   2. Sitting down on and standing up from a chair with arms ( e.g., wheelchair, bedside commode, etc.)   [] 1   [x] 2   [] 3   [] 4   3. Moving from lying on back to sitting on the side of the bed? [] 1   [x] 2   [] 3   [] 4   How much help from another person does the patient currently need. .. Total A Lot A Little None   4. Moving to and from a bed to a chair (including a wheelchair)? [] 1   [] 2   [x] 3   [] 4   5. Need to walk in hospital room? [] 1   [x] 2   [] 3   [] 4   6. Climbing 3-5 steps with a railing?    [x] 1   [] 2   [] 3   [] 4   © 2007, Trustees of [address] [place] [address] [address] [id number], under license to [employer]. All rights reserved     Score:  Initial: 9 Most Recent: 13 (Date: 10/20/21 )    Interpretation of Tool:  Represents activities that are increasingly more difficult (i.e. Bed mobility, Transfers, Gait). PLAN:   FREQUENCY/DURATION: PT Plan of Care: 3 times/week for duration of hospital stay or until stated goals are met, whichever comes first.    PROBLEM LIST:   (Skilled intervention is medically necessary to address:)  1. Decreased ADL/Functional Activities  2. Decreased Activity Tolerance  3. Decreased AROM/PROM  4. Decreased Balance  5. Decreased Cognition  6. Decreased Coordination  7. Decreased Gait Ability  8. Decreased Transfer Abilities   INTERVENTIONS PLANNED:   (Benefits and precautions of physical therapy have been discussed with the patient.)  1. Therapeutic Activity  2. Therapeutic Exercise/HEP  3. Neuromuscular Re-education  4. Gait Training  5. Manual Therapy  6. Education     TREATMENT:     EVALUATION: Moderate Complexity : (Untimed Charge)    TREATMENT:   ($$ Therapeutic Activity: 8-22 mins    )  Co-Treatment PT/OT necessary due to patient's decreased overall endurance/tolerance levels, as well as need for high level skilled assistance to complete functional transfers/mobility and functional tasks  Therapeutic Activity (8 Minutes): Therapeutic activity included Sit to Supine, Scooting, Transfer Training, Ambulation on level ground, Sitting balance  and Standing balance to improve functional Mobility, Strength, Activity tolerance and balance, transfer technique.     TREATMENT GRID:  N/A    AFTER TREATMENT POSITION/PRECAUTIONS:  Bed, Needs within reach, RN notified and Visitors at bedside    INTERDISCIPLINARY COLLABORATION:  RN/PCT, PT/PTA and OT/ALMEIDA    TOTAL TREATMENT DURATION:  PT Patient Time In/Time Out  Time In: 1530  Time Out: [address] [place] [address], [facility]

## 2021-10-20 NOTE — ASSESSMENT & PLAN NOTE
Secondary to hemorrhage    10/21: a little out of it today, seems secondary to insufficient pain control vs sundowning. Discussed with . Will monitor closely. Resolved on afternoon recheck.

## 2021-10-20 NOTE — ASSESSMENT & PLAN NOTE
CTA showed aneurysm but not in the area of her hemorrhage. Not a candidate for surgical intervention. several subsequent CT of the brain, showing stable hemorrhage. Encephalopathy resolved. -Avoid antiplatelet, anticoagulants.   -PT, OT, SLP   -Able to swallow.  Continue with puree diet  -keppra (renal dosing)

## 2021-10-20 NOTE — PROGRESS NOTES
Hospitalist Progress Note   Admit Date:  10/3/2021  2:16 AM   Name:  Tai Leonardo   Age:  78 y.o. Sex:  female  :  1940   MRN:  [id number]   Room:      Presenting Complaint: Headache    Reason(s) for Admission: Intracranial bleed ([facility] [address] [I62.9]  Dementia ([facility] [address] [F03.90]  Poorly-controlled hypertension [I10]  Hypothyroidism [E03.9]  Encephalopathy, unspecified [G93.40]     Hospital Course & Interval History:   81F PMHx hypertension, dementia, hypothyroid admitted to the ICU 10/3 for altered mental status found to be an acute hemorrhage of the right temporal lobe. She was evaluated by neurosurgery and was found to not be a surgical candidate. On 10/3 a code was initiated for increased intracranial pressure and mannitol and cardene were started. She developed delirium and agitation. depakote was started. She remained altered for several days but slowly became more active. Subjective (10/20/21): She has no new complaints today. She is participating with PT, OT and SLP. Her dispo is pending insurance. Assessment & Plan:   * Hemorrhage of right temporal lobe (HCC)  CTA showed aneurysm but not in the area of her hemorrhage. Not a candidate for surgical intervention. several subsequent CT of the brain, showing stable hemorrhage. Encephalopathy resolved. -Avoid antiplatelet, anticoagulants.   -PT, OT, SLP   -Able to swallow.  Continue with puree diet  -keppra    LEODAN (acute kidney injury) ([facility] [address]  Admission sCr 1.8 from presumed baseline ~ 1.4 (improved - stable)      Hypothyroidism  - levothyroxine    Dementia (HCC)  -divalproex, escitalopram    Acute metabolic encephalopathy  Secondary to hemorrhage,  resolved    Poorly-controlled hypertension  -amlodipine        Dispo/Discharge Planning:      STR pending insurance    Diet:  ADULT TUBE FEEDING Nasogastric; Standard with Fiber; Delivery Method: Continuous; Continuous Initial Rate (mL/hr): 10; Continuous Advance Tube Feeding: Yes; Advancement Volume (mL/hr): 10; Advancement Frequency: Other (Specify); Specify Other Adva. ..   ADULT DIET Dysphagia - Pureed; Mildly Thick (Nectar); single straw sip; 1:1 assistance  ADULT ORAL NUTRITION SUPPLEMENT Lunch, Dinner; Frozen Supplement  ADULT ORAL NUTRITION SUPPLEMENT Breakfast, Lunch, Dinner; Renal Supplement  DVT PPx: SCDs  Code status: Full Code    Hospital Problems as of 10/20/2021 Never Reviewed        Codes Class Noted - Resolved POA    * (Principal) Hemorrhage of right temporal lobe ([facility] ICD-10-CM: I61.1  ICD-9-CM: 934  10/10/2021 - Present Yes        LEODAN (acute kidney injury) ([facility] ICD-10-CM: N17.9  ICD-9-CM: 584.9  10/10/2021 - Present No        Hypothyroidism ICD-10-CM: E03.9  ICD-9-CM: 244.9  6/29/2012 - Present Yes        Dementia ([facility] ICD-10-CM: F03.90  ICD-9-CM: 294.20  10/3/2021 - Present Yes        HTN (hypertension) (Chronic) ICD-10-CM: I10  ICD-9-CM: 401.9  6/29/2012 - Present Yes        Pleural effusion, bilateral ICD-10-CM: J90  ICD-9-CM: 511.9  10/11/2021 - Present Yes        Chronic diastolic congestive heart failure (HCC) ICD-10-CM: I50.32  ICD-9-CM: 428.32, 428.0  10/11/2021 - Present Yes        PAF (paroxysmal atrial fibrillation) (HCC) ICD-10-CM: I48.0  ICD-9-CM: 427.31  10/11/2021 - Present Yes        Acute metabolic encephalopathy TSK-58-DW: G93.41  ICD-9-CM: 348.31  10/10/2021 - Present Yes        Poorly-controlled hypertension ICD-10-CM: I10  ICD-9-CM: 401.9  10/3/2021 - Present Yes        RESOLVED: Acute pulmonary edema with congestive heart failure ([facility] ICD-10-CM: I50.1  ICD-9-CM: 428.1  10/11/2021 - 10/11/2021 Clinically Undetermined        RESOLVED: Atrial fibrillation with RVR ([address] ICD-10-CM: I48.91  ICD-9-CM: 427.31  10/10/2021 - 10/11/2021 Clinically Undetermined        RESOLVED: UTI (urinary tract infection) ICD-10-CM: N39.0  ICD-9-CM: 599.0  7/2/2012 - 10/11/2021 Yes              Objective:     Patient Vitals for the past 24 hrs:   Temp Pulse Resp BP SpO2   10/20/21 1408     95 %   10/20/21 1203 97.4 °F (36.3 °C) (!) 52 18 (!) 109/53 96 %   10/20/21 0800 97.4 °F (36.3 °C) (!) 49 18 (!) 109/59 92 %   10/20/21 0400 98.5 °F (36.9 °C) (!) 51 17 112/66 96 %   10/20/21 0000 97.6 °F (36.4 °C) (!) 46 16 115/65 96 %   10/19/21 2000 97.7 °F (36.5 °C) (!) 49 17 (!) 107/58 93 %   10/19/21 1600 97.5 °F (36.4 °C) (!) 51 16 (!) 107/48 96 %     Oxygen Therapy  O2 Sat (%): 95 % (10/20/21 1408)  Pulse via Oximetry: 78 beats per minute (10/12/21 1352)  O2 Device: Hi flow nasal cannula (10/20/21 1408)  Skin Assessment: Other (see comment/note) (no breakdown; mucosa dry) (10/09/21 1900)  Skin Protection for O2 Device: N/A (10/03/21 2001)  O2 Flow Rate (L/min): 3 l/min (10/20/21 1408)  O2 Temperature: 35.6 °F (2 °C) (10/09/21 0705)    Estimated body mass index is 27.71 kg/m² as calculated from the following:    Height as of this encounter: 5' 5\" (1.651 m). Weight as of this encounter: 75.5 kg (166 lb 8 oz). Intake/Output Summary (Last 24 hours) at 10/20/2021 1510  Last data filed at 10/19/2021 1826  Gross per 24 hour   Intake    Output 600 ml   Net -600 ml       Blood pressure (!) 109/53, pulse (!) 52, temperature 97.4 °F (36.3 °C), resp. rate 18, height 5' 5\" (1.651 m), weight 75.5 kg (166 lb 8 oz), SpO2 95 %. Physical Exam  Vitals and nursing note reviewed. Constitutional:       General: She is not in acute distress. Appearance: Normal appearance. HENT:      Head: Normocephalic. Eyes:      Extraocular Movements: Extraocular movements intact. Cardiovascular:      Rate and Rhythm: Normal rate and regular rhythm. Pulses:           Radial pulses are 2+ on the left side. Pulmonary:      Effort: Pulmonary effort is normal. No respiratory distress. Musculoskeletal:         General: No deformity. Cervical back: No rigidity. Right lower leg: No edema. Left lower leg: No edema. Skin:     General: Skin is warm and dry.    Neurological:      General: No focal deficit present. Mental Status: She is alert. Comments: Oriented to self and situation   Psychiatric:         Mood and Affect: Mood normal.         Behavior: Behavior normal.         I have reviewed ordered lab tests and independently visualized imaging below:    Recent Labs:  Recent Results (from the past 48 hour(s))   METABOLIC PANEL, BASIC    Collection Time: 10/19/21  6:03 AM   Result Value Ref Range    Sodium 138 136 - 145 mmol/L    Potassium 4.5 3.5 - 5.1 mmol/L    Chloride 105 98 - 107 mmol/L    CO2 31 21 - 32 mmol/L    Anion gap 2 (L) 7 - 16 mmol/L    Glucose 90 65 - 100 mg/dL    BUN 29 (H) 8 - 23 MG/DL    Creatinine 1.29 (H) 0.6 - 1.0 MG/DL    GFR est AA 51 (L) >60 ml/min/1.73m2    GFR est non-AA 42 (L) >60 ml/min/1.73m2    Calcium 8.8 8.3 - 94.5 MG/DL   METABOLIC PANEL, BASIC    Collection Time: 10/20/21  3:07 AM   Result Value Ref Range    Sodium 139 136 - 145 mmol/L    Potassium 4.5 3.5 - 5.1 mmol/L    Chloride 106 98 - 107 mmol/L    CO2 31 21 - 32 mmol/L    Anion gap 2 (L) 7 - 16 mmol/L    Glucose 78 65 - 100 mg/dL    BUN 30 (H) 8 - 23 MG/DL    Creatinine 1.38 (H) 0.6 - 1.0 MG/DL    GFR est AA 47 (L) >60 ml/min/1.73m2    GFR est non-AA 39 (L) >60 ml/min/1.73m2    Calcium 8.6 8.3 - 10.4 MG/DL   MAGNESIUM    Collection Time: 10/20/21  3:07 AM   Result Value Ref Range    Magnesium 2.1 1.8 - 2.4 mg/dL   PHOSPHORUS    Collection Time: 10/20/21  3:07 AM   Result Value Ref Range    Phosphorus 3.0 2.3 - 3.7 MG/DL       All Micro Results     Procedure Component Value Units Date/Time    COVID-19 RAPID TEST [[id number]] Collected: 10/12/21 0996    Order Status: Completed Specimen: Nasopharyngeal Updated: 10/12/21 1014     Specimen source NASAL        COVID-19 rapid test Not detected        Comment:      The specimen is NEGATIVE for SARS-CoV-2, the novel coronavirus associated with COVID-19. A negative result does not rule out COVID-19.        This test has been authorized by the FDA under an Emergency Use Authorization (EUA) for use by authorized laboratories. Fact sheet for Healthcare Providers: ConventionUpdate.co.nz  Fact sheet for Patients: ConventionUpdate.co.nz       Methodology: Isothermal Nucleic Acid Amplification         CULTURE, URINE [[id number]]  (Abnormal)  (Susceptibility) Collected: 10/04/21 2107    Order Status: Completed Specimen: Cath Urine Updated: 10/07/21 0749     Special Requests: NO SPECIAL REQUESTS        Culture result:       >100,000 COLONIES/mL KLEBSIELLA PNEUMONIAE                Other Studies:  No results found.     Current Meds:  Current Facility-Administered Medications   Medication Dose Route Frequency    lactated Ringers infusion  125 mL/hr IntraVENous CONTINUOUS    [Held by provider] carvediloL (COREG) tablet 6.25 mg  6.25 mg Oral BID WITH MEALS    amLODIPine (NORVASC) tablet 10 mg  10 mg Oral DAILY    polyethylene glycol (MIRALAX) packet 17 g  17 g Oral DAILY    senna-docusate (PERICOLACE) 8.6-50 mg per tablet 2 Tablet  2 Tablet Oral DAILY    levothyroxine (SYNTHROID) tablet 150 mcg  150 mcg Oral ACB    escitalopram oxalate (LEXAPRO) 5 mg/5 mL oral solution soln 10 mg  10 mg Oral DAILY    meclizine (ANTIVERT) tablet 25 mg  25 mg Oral TID PRN    ondansetron (ZOFRAN) injection 8 mg  8 mg IntraVENous Q6H PRN    nystatin (MYCOSTATIN) 100,000 unit/gram powder   Topical BID    NUTRITIONAL SUPPORT ELECTROLYTE PRN ORDERS   Does Not Apply PRN    hydrALAZINE (APRESOLINE) 20 mg/mL injection 15 mg  15 mg IntraVENous Q4H PRN    central line flush (saline) syringe 10 mL  10 mL InterCATHeter Q8H    [Held by provider] furosemide (LASIX) injection 40 mg  40 mg IntraVENous BID    [Held by provider] QUEtiapine (SEROquel) tablet 12.5 mg  12.5 mg Oral QHS    acetaminophen (TYLENOL) tablet 650 mg  650 mg Oral Q4H PRN    levETIRAcetam (KEPPRA) 500 mg in 0.9% sodium chloride (MBP/ADV) 100 mL MBP  500 mg IntraVENous Q12H    divalproex (DEPAKOTE SPRINKLE) capsule 125 mg  125 mg Oral Q12H    LORazepam (ATIVAN) injection 1 mg  1 mg IntraVENous Q4H PRN    sodium chloride (NS) flush 5-40 mL  5-40 mL IntraVENous PRN       Signed:  Parker Toscano MD

## 2021-10-20 NOTE — PROGRESS NOTES
ACUTE OT GOALS:  (Developed with and agreed upon by patient and/or caregiver.)  NEW GOALS AT RE-EVALUATION 10/20/21  1. Patient will complete upper body bathing and dressing with SET UP and adaptive equipment as needed. 2. Patient will complete toileting with MIN A X1.   3. Patient will complete grooming ADL with SET UP.  4. Patient will tolerate 25 minutes of OT treatment with 1-2 rest breaks to increase activity tolerance for ADLs. 5. Patient will complete functional transfers with CGA X1 and adaptive equipment as needed. 6. Patient will tolerate 10 minutes BUE exercises to increase strength for safe, functional transfers.      Timeframe: 7 visits     OCCUPATIONAL THERAPY: Daily Note OT Treatment Day # 1    Efraín Saldivar is a 78 y.o. female   PRIMARY DIAGNOSIS: Hemorrhage of right temporal lobe (HCC)  Intracranial bleed ([facility] [I62.9]  Dementia ([facility] [facility] [F03.90]  Poorly-controlled hypertension [I10]  Hypothyroidism [E03.9]  Encephalopathy, unspecified [G93.40]  Procedure(s) (LRB):  ESOPHAGOGASTRODUODENOSCOPY (EGD) with NG tube placement Room 715 (N/A)  8 Days Post-Op  Payor: [facility] MEDICARE / Plan: Via [employer] / Product Type: [email or web address] Care Medicare /   ASSESSMENT:     REHAB RECOMMENDATIONS: CURRENT LEVEL OF FUNCTION:  (Most Recently Demonstrated)   Recommendation to date pending progress:  Setting:   Short-term Rehab  Equipment:    None pt has all needs at home Bathing:   Not tested  Dressing:   Not tested  Feeding/Grooming:   Not tested  Toileting:   Not tested  Functional Mobility:   Minimal Assistance x 2     ASSESSMENT:  Ms. Kaushik Lopez continues to present with deficits in overall strength, activity tolerance, ADL performance and functional mobility. Admitted for intracranial bleed, no plans for surgical intervention per neurosurgery. Pt assisted back to bed with PT.  Pt required mod A x2 RW for sit<>stand transfer with mod verbal and tactile cues for hand placement on RW and sequencing of transfer. Pt min A x2 RW taking steps from chair>bed. Pt required increased time and seated RB due to c/o nausea and vertigo, this seemed to resolve after seated RB and therapist directed pursed lip breathing. Pt required min verbal cues to keep eyes open throughout treatment. Pt then mod A x2 for sit>supine transfer. Pt making progress toward goals, continue POC. SUBJECTIVE:   Ms. Zachary Estrada states, \"You ladies came at the right time. \"    SOCIAL HISTORY/LIVING ENVIRONMENT: lives with , one level home, 2STE, tub shower w/ SC, rollator, RW and SPC, 2 recent falls  Support Systems: Spouse/Significant Other Bradley Tatiana [phone] (spouse))    OBJECTIVE:     PAIN: VITAL SIGNS: LINES/DRAINS:   Pre Treatment: Pain Screen  Pain Scale 1: Numeric (0 - 10)  Pain Intensity 1: 0  Post Treatment: 0 Vital Signs  O2 Sat (%): 95 %  O2 Device: Hi flow nasal cannula  O2 Flow Rate (L/min): 2 l/min IV and Purewick  O2 Device: Hi flow nasal cannula     ACTIVITIES OF DAILY LIVING: I Mod I S SBA CGA Min Mod Max Total NT Comments   BASIC ADLs:              Bathing/ Showering [] [] [] [] [] [] [] [] [] [x]    Toileting [] [] [] [] [] [] [] [] [] [x]    Dressing [] [] [] [] [] [] [] [] [] [x]    Feeding [] [] [] [] [] [] [] [] [] [x]    Grooming [] [] [] [] [] [] [] [] [] [x]    Personal Device Care [] [] [] [] [] [] [] [] [] [x]    Functional Mobility [] [] [] [] [] [x] [x] [] [] [] x2 RW   I=Independent, Mod I=Modified Independent, S=Supervision, SBA=Standby Assistance, CGA=Contact Guard Assistance,   Min=Minimal Assistance, Mod=Moderate Assistance, Max=Maximal Assistance, Total=Total Assistance, NT=Not Tested    MOBILITY: I Mod I S SBA CGA Min Mod Max Total  NT x2 Comments:   Supine to sit [] [] [] [] [] [] [] [] [] [x] []    Sit to supine [] [] [] [] [] [] [x] [] [] [] [x]    Sit to stand [] [] [] [] [] [] [x] [] [] [] [x] RW   Bed to chair [] [] [] [] [] [x] [] [] [] [] [x] RW   I=Independent, Mod I=Modified Independent, S=Supervision, SBA=Standby Assistance, CGA=Contact Guard Assistance,   Min=Minimal Assistance, Mod=Moderate Assistance, Max=Maximal Assistance, Total=Total Assistance, NT=Not Tested    BALANCE: Good Fair+ Fair Fair- Poor NT Comments   Sitting Static [] [x] [] [] [] [] Leaning on RW   Sitting Dynamic [] [] [] [] [] [x]              Standing Static [] [x] [] [] [] [] CGA RW   Standing Dynamic [] [] [x] [] [] [] Min-mod A x2 RW steps chair>bed     PLAN:   FREQUENCY/DURATION: OT Plan of Care: 3 times/week for duration of hospital stay or until stated goals are met, whichever comes first.    TREATMENT:   TREATMENT:   ($$ Self Care/Home Management: 8-22 mins$$ Neuromuscular Re-Education: 8-22 mins   )  Co-Treatment PT/OT necessary due to patient's decreased overall endurance/tolerance levels, as well as need for high level skilled assistance to complete functional transfers/mobility and functional tasks  Neuromuscular Re-education (11 Minutes): Neuromuscular Re-education included Balance Training, Coordination training, Functional mobility with facilitation, Postural training, Sitting balance training and Standing balance training to improve Balance, Coordination, Functional Mobility, Postural Control and Proprioception. No self care charge this treatment, pt was seen by this OT earlier this date 10/20/21 and charges carried over for this note. Pt was not charged separately for self care this session, only neuromuscular re-education.     TREATMENT GRID:  N/A    AFTER TREATMENT POSITION/PRECAUTIONS:  Alarm Activated, Bed, Needs within reach, RN notified and Visitors at bedside    INTERDISCIPLINARY COLLABORATION:  RN/PCT, PT/PTA and OT/ALMEIDA    TOTAL TREATMENT DURATION:  OT Patient Time In/Time Out  Time In: [facility]  Time Out: [address], OT

## 2021-10-20 NOTE — PROGRESS NOTES
10/20/21 0646   NIH Stroke Scale   Interval Handoff/Transfer  (dual with MAHENDRA San)   Level of Conciousness (1a) 0   LOC Questions (1b) (!) 2   LOC Commands (1c) 0   Best Gaze (2) 0   Visual (3) 0   Facial Palsy (4) 0   Motor Arm, Left (5a) 0   Motor Arm, Right (5b) 0   Motor Leg, Left (6a) (!) 2   Motor Leg, Right (6b) (!) 2   Limb Ataxia (7) 0   Sensory (8) 0   Best Language (9) 0   Dysarthria (10) 0   Extinction and Inattention (11) 0   Total 6

## 2021-10-20 NOTE — PROGRESS NOTES
Comprehensive Nutrition Assessment    Type and Reason for Visit: Reassess  Tube Feeding Management (Hospitalist)    Nutrition Recommendations/Plan:   Meals and Snacks:  Continue current diet. Diet advancement per SLP  Nutrition Supplement Therapy:   Medical food supplement therapy:  Continue Magic Cup three times per day (this provides 290 kcal and 9 grams protein per serving)    Discontinue Nepro three times per day as supplement not mildly thick per SLP diet orders   Enteral Nutrition:   Discontinue current TF orders as pt NGT not replaced, tolerating diet thus far     Malnutrition Assessment:  Malnutrition Status: At risk for malnutrition (specify) (adv age, poor PO intake, unknown baseline)    Nutrition Assessment:   Nutrition History: Unable to assess. Nutrition Background: Patient with PMH signifncant for HTN, dementia. She presented with headache and increased confusion. CT of head showed right temporal IPH around 2 cm  Daily Update:  Pt seen in recliner with spouse present. Pt reports continued increase in appetite and intake, spouse reports assisting pt with meals since 10/18 when visitors were allowed at [facility] again. RD observed lunch tray at bedside, ~50% of entire tray consumed, pt and spouse working on consuming magic cup at time of assessment. Pt and spouse report similar intake for breakfast this AM. Discussed pt with RN. Nutrition Related Findings:   NFPE with no physcial signs of malnutrition. 10/11- Pt pending IR for tube placement, TF to begin pending clearance. 10/13- NGT placed per GI 10/12, Jevity 1.5 began infusing 10/12. 10/15- TF remains off following MBS this AM. Now on puree diet w/ nectar thick liquids. Abdominal XR revealing FT coiled in [facility] and contrast from MBS in colon. 10/20- NGT removed, not replaced 10/18. Pt tolerating puree diet.       Current Nutrition Therapies:  ADULT TUBE FEEDING Nasogastric; Standard with Fiber; Delivery Method: Continuous; Continuous Initial Rate (mL/hr): 10; Continuous Advance Tube Feeding: Yes; Advancement Volume (mL/hr): 10; Advancement Frequency: Other (Specify); Specify Other Adva. .. ADULT DIET Dysphagia - Pureed; Mildly Thick (Nectar); single straw sip; 1:1 assistance  ADULT ORAL NUTRITION SUPPLEMENT Lunch, Dinner; Frozen Supplement  ADULT ORAL NUTRITION SUPPLEMENT Breakfast, Lunch, Dinner; Renal Supplement    Current Intake:   Average Meal Intake: 26-50% Average Supplement Intake: 26-50%      Anthropometric Measures:  Height: 5' 5\" (165.1 cm)  Current Body Wt: 75.7 kg (166 lb 14.2 oz) (10/20), Weight source: Bed scale  BMI: 27.8, Overweight (BMI 25.0-29. 9)  Admission Body Weight: 159 lb 6.3 oz (bed scale 10/3)  Ideal Body Weight (lbs) (Calculated): 125 lbs (57 kg), 129.5 %  Usual Body Wt: 72.1 kg (158 lb 15.2 oz) (per EMR review last weighed 4/2 office wt), Percent weight change: -0.4          Edema: No data recorded   Estimated Daily Nutrient Needs:  Energy (kcal/day): 9208-2203 (Kcal/kg (20-25), Weight Used: Current (71.8 kg (10/10)))  Protein (g/day): 72-86 (1-1.2 g/kg) Weight Used: (Current)  Fluid (ml/day):   (1 ml/kcal)    Nutrition Diagnosis:   · Inadequate oral intake related to cognitive or neurological impairment as evidenced by  (recent diet modification from NPO to puree diet)     Nutrition Interventions:   Food and/or Nutrient Delivery: Continue current diet, Continue oral nutrition supplement, Discontinue oral nutrition supplement, Discontinue tube feeding     Coordination of Nutrition Care: Continue to monitor while inpatient  Plan of Care discussed with Reinier David RN    Goals:   Previous Goal Met: Progressing toward goal(s)  Active Goal: Tolerate >75% of estimated intake needs by next RD follow up.     Nutrition Monitoring and Evaluation:      Food/Nutrient Intake Outcomes: Diet advancement/tolerance, Food and nutrient intake, Supplement intake  Physical Signs/Symptoms Outcomes: Chewing or swallowing, Meal time behavior    Discharge Planning:    Continue oral nutrition supplement    Dee Dee Cosby RD, LD  [phone]    Disaster Mode Active

## 2021-10-21 PROBLEM — N18.31 CHRONIC KIDNEY DISEASE, STAGE 3A (HCC): Status: ACTIVE | Noted: 2021-10-10

## 2021-10-21 PROBLEM — F03.918 DEMENTIA WITH BEHAVIORAL DISTURBANCE: Status: ACTIVE | Noted: 2021-10-03

## 2021-10-21 LAB
ANION GAP SERPL CALC-SCNC: 6 MMOL/L (ref 7–16)
BUN SERPL-MCNC: 29 MG/DL (ref 8–23)
CALCIUM SERPL-MCNC: 8.8 MG/DL (ref 8.3–10.4)
CHLORIDE SERPL-SCNC: 107 MMOL/L (ref 98–107)
CO2 SERPL-SCNC: 26 MMOL/L (ref 21–32)
CREAT SERPL-MCNC: 1.27 MG/DL (ref 0.6–1)
GLUCOSE SERPL-MCNC: 76 MG/DL (ref 65–100)
POTASSIUM SERPL-SCNC: 4 MMOL/L (ref 3.5–5.1)
SODIUM SERPL-SCNC: 139 MMOL/L (ref 136–145)

## 2021-10-21 PROCEDURE — 74011250637 HC RX REV CODE- 250/637: Performed by: INTERNAL MEDICINE

## 2021-10-21 PROCEDURE — 74011250637 HC RX REV CODE- 250/637: Performed by: STUDENT IN AN ORGANIZED HEALTH CARE EDUCATION/TRAINING PROGRAM

## 2021-10-21 PROCEDURE — 74011250636 HC RX REV CODE- 250/636: Performed by: STUDENT IN AN ORGANIZED HEALTH CARE EDUCATION/TRAINING PROGRAM

## 2021-10-21 PROCEDURE — 80048 BASIC METABOLIC PNL TOTAL CA: CPT

## 2021-10-21 PROCEDURE — 74011000258 HC RX REV CODE- 258: Performed by: STUDENT IN AN ORGANIZED HEALTH CARE EDUCATION/TRAINING PROGRAM

## 2021-10-21 PROCEDURE — 92526 ORAL FUNCTION THERAPY: CPT

## 2021-10-21 PROCEDURE — 65270000029 HC RM PRIVATE

## 2021-10-21 RX ADMIN — Medication 10 ML: at 21:21

## 2021-10-21 RX ADMIN — ACETAMINOPHEN 650 MG: 325 TABLET ORAL at 10:42

## 2021-10-21 RX ADMIN — DIVALPROEX SODIUM 125 MG: 125 CAPSULE ORAL at 09:18

## 2021-10-21 RX ADMIN — LEVETIRACETAM 500 MG: 100 INJECTION, SOLUTION INTRAVENOUS at 05:30

## 2021-10-21 RX ADMIN — ESCITALOPRAM OXALATE 10 MG: 5 SOLUTION ORAL at 09:23

## 2021-10-21 RX ADMIN — AMLODIPINE BESYLATE 10 MG: 10 TABLET ORAL at 09:18

## 2021-10-21 RX ADMIN — NYSTATIN: 100000 POWDER TOPICAL at 09:19

## 2021-10-21 RX ADMIN — SENNOSIDES AND DOCUSATE SODIUM 2 TABLET: 8.6; 5 TABLET ORAL at 09:18

## 2021-10-21 RX ADMIN — LEVETIRACETAM 500 MG: 100 INJECTION, SOLUTION INTRAVENOUS at 18:03

## 2021-10-21 RX ADMIN — LEVOTHYROXINE SODIUM 150 MCG: 0.07 TABLET ORAL at 05:30

## 2021-10-21 RX ADMIN — Medication 10 ML: at 15:35

## 2021-10-21 RX ADMIN — Medication 10 ML: at 05:30

## 2021-10-21 RX ADMIN — NYSTATIN: 100000 POWDER TOPICAL at 18:03

## 2021-10-21 RX ADMIN — DIVALPROEX SODIUM 125 MG: 125 CAPSULE ORAL at 21:21

## 2021-10-21 RX ADMIN — POLYETHYLENE GLYCOL 3350 17 G: 17 POWDER, FOR SOLUTION ORAL at 09:19

## 2021-10-21 NOTE — PROGRESS NOTES
Hospitalist Progress Note   Admit Date:  10/3/2021  2:16 AM   Name:  Portia Shankar   Age:  78 y.o. Sex:  female  :  1940   MRN:  [id number]   Room:      Presenting Complaint: Headache    Reason(s) for Admission: Intracranial bleed ([facility] [address] [I62.9]  Dementia ([facility] [address] [F03.90]  Poorly-controlled hypertension [I10]  Hypothyroidism [E03.9]  Encephalopathy, unspecified [G93.40]     Hospital Course & Interval History:   81F PMHx hypertension, dementia, hypothyroid admitted to the ICU 10/3 for altered mental status found to be an acute hemorrhage of the right temporal lobe. She was evaluated by neurosurgery and was found to not be a surgical candidate. On 10/3 a code was initiated for increased intracranial pressure and mannitol and cardene were started. She developed delirium and agitation. depakote was started. She remained altered for several days but slowly became more active. Subjective (10/21/21): She was confused this morning. She was having hip pain that was inadequately controlled. Seen again in the afternoon with  and she is much better.  gives a long history of her hip pain. She is participating with PT, OT and SLP. Her dispo is pending insurance. Assessment & Plan:   * Hemorrhage of right temporal lobe (HCC)  CTA showed aneurysm but not in the area of her hemorrhage. Not a candidate for surgical intervention. several subsequent CT of the brain, showing stable hemorrhage. Encephalopathy resolved. -Avoid antiplatelet, anticoagulants.   -PT, OT, SLP   -Able to swallow.  Continue with puree diet  -keppra (renal dosing)    Chronic kidney disease, stage 3a (HCC)  Admission sCr 1.8 from presumed baseline ~ 1.4  -renal dosing as appropriate  -refer to nephrology at discharge      Hypothyroidism  - levothyroxine    Dementia with behavioral disturbance (HCC)  -divalproex, escitalopram    Primary hypertension  -amlodipine    Acute metabolic encephalopathy  Secondary to hemorrhage    10/21: a little out of it today, seems secondary to insufficient pain control vs sundowning. Discussed with . Will monitor closely. Resolved on afternoon recheck.         Dispo/Discharge Planning:      STR pending insurance    Diet:  ADULT DIET Dysphagia - Pureed; Mildly Thick (Nectar); single straw sip; 1:1 assistance  ADULT ORAL NUTRITION SUPPLEMENT Lunch, Dinner; Frozen Supplement  DVT PPx: SCDs  Code status: Full Code    Hospital Problems as of 10/21/2021 Date Reviewed: 10/21/2021        Codes Class Noted - Resolved POA    * (Principal) Hemorrhage of right temporal lobe ([facility] ICD-10-CM: I61.1  ICD-9-CM: 624  10/10/2021 - Present Yes        Chronic kidney disease, stage 3a ([facility] ICD-10-CM: N18.31  ICD-9-CM: 585.3  10/10/2021 - Present No        Hypothyroidism ICD-10-CM: E03.9  ICD-9-CM: 244.9  6/29/2012 - Present Yes        Dementia with behavioral disturbance ([facility] ICD-10-CM: F03.91  ICD-9-CM: 294.21  10/3/2021 - Present Yes        Primary hypertension ICD-10-CM: I10  ICD-9-CM: 401.9  6/29/2012 - Present Yes        Pleural effusion, bilateral ICD-10-CM: J90  ICD-9-CM: 511.9  10/11/2021 - Present Yes        Chronic diastolic congestive heart failure (HCC) ICD-10-CM: I50.32  ICD-9-CM: 428.32, 428.0  10/11/2021 - Present Yes        PAF (paroxysmal atrial fibrillation) (HCC) ICD-10-CM: I48.0  ICD-9-CM: 427.31  10/11/2021 - Present Yes        Acute metabolic encephalopathy [id number]: G93.41  ICD-9-CM: 348.31  10/10/2021 - Present Yes        Poorly-controlled hypertension ICD-10-CM: I10  ICD-9-CM: 401.9  10/3/2021 - Present Yes        RESOLVED: Acute pulmonary edema with congestive heart failure ([facility] ICD-10-CM: I50.1  ICD-9-CM: 428.1  10/11/2021 - 10/11/2021 Clinically Undetermined        RESOLVED: Atrial fibrillation with RVR ([address] ICD-10-CM: I48.91  ICD-9-CM: 427.31  10/10/2021 - 10/11/2021 Clinically Undetermined        RESOLVED: UTI (urinary tract infection) ICD-10-CM: N39.0  ICD-9-CM: 599.0 7/2/2012 - 10/11/2021 Yes              Objective:     Patient Vitals for the past 24 hrs:   Temp Pulse Resp BP SpO2   10/21/21 0400 98 °F (36.7 °C) 60 17 135/81 97 %   10/21/21 0000 98.5 °F (36.9 °C) 98 18 122/61 98 %   10/20/21 2000 98.1 °F (36.7 °C) 79 18 122/67 90 %   10/20/21 1700 97.6 °F (36.4 °C) (!) 55 18 110/62 95 %     Oxygen Therapy  O2 Sat (%): 97 % (10/21/21 0400)  Pulse via Oximetry: 78 beats per minute (10/12/21 1352)  O2 Device: Hi flow nasal cannula (10/20/21 1541)  Skin Assessment: Other (see comment/note) (no breakdown; mucosa dry) (10/09/21 1900)  Skin Protection for O2 Device: N/A (10/03/21 2001)  O2 Flow Rate (L/min): 2 l/min (10/20/21 1529)  O2 Temperature: 35.6 °F (2 °C) (10/09/21 0705)    Estimated body mass index is 27.73 kg/m² as calculated from the following:    Height as of this encounter: 5' 5\" (1.651 m). Weight as of this encounter: 75.6 kg (166 lb 10.7 oz). Intake/Output Summary (Last 24 hours) at 10/21/2021 1432  Last data filed at 10/20/2021 1707  Gross per 24 hour   Intake    Output 400 ml   Net -400 ml       Blood pressure 135/81, pulse 60, temperature 98 °F (36.7 °C), resp. rate 17, height 5' 5\" (1.651 m), weight 75.6 kg (166 lb 10.7 oz), SpO2 97 %. Physical Exam  Vitals and nursing note reviewed. Constitutional:       General: She is not in acute distress. Appearance: Normal appearance. HENT:      Head: Normocephalic. Eyes:      Extraocular Movements: Extraocular movements intact. Cardiovascular:      Rate and Rhythm: Normal rate and regular rhythm. Pulses:           Radial pulses are 2+ on the left side. Pulmonary:      Effort: Pulmonary effort is normal. No respiratory distress. Musculoskeletal:         General: No deformity. Cervical back: No rigidity. Right lower leg: No edema. Left lower leg: No edema. Skin:     General: Skin is warm and dry. Neurological:      General: No focal deficit present.       Mental Status: She is alert and oriented to person, place, and time. Comments: Confused in the morning, AOx4 pm   Psychiatric:         Mood and Affect: Mood normal.         Behavior: Behavior normal.         I have reviewed ordered lab tests and independently visualized imaging below:    Recent Labs:  Recent Results (from the past 48 hour(s))   METABOLIC PANEL, BASIC    Collection Time: 10/20/21  3:07 AM   Result Value Ref Range    Sodium 139 136 - 145 mmol/L    Potassium 4.5 3.5 - 5.1 mmol/L    Chloride 106 98 - 107 mmol/L    CO2 31 21 - 32 mmol/L    Anion gap 2 (L) 7 - 16 mmol/L    Glucose 78 65 - 100 mg/dL    BUN 30 (H) 8 - 23 MG/DL    Creatinine 1.38 (H) 0.6 - 1.0 MG/DL    GFR est AA 47 (L) >60 ml/min/1.73m2    GFR est non-AA 39 (L) >60 ml/min/1.73m2    Calcium 8.6 8.3 - 10.4 MG/DL   MAGNESIUM    Collection Time: 10/20/21  3:07 AM   Result Value Ref Range    Magnesium 2.1 1.8 - 2.4 mg/dL   PHOSPHORUS    Collection Time: 10/20/21  3:07 AM   Result Value Ref Range    Phosphorus 3.0 2.3 - 3.7 MG/DL   METABOLIC PANEL, BASIC    Collection Time: 10/21/21  3:18 AM   Result Value Ref Range    Sodium 139 136 - 145 mmol/L    Potassium 4.0 3.5 - 5.1 mmol/L    Chloride 107 98 - 107 mmol/L    CO2 26 21 - 32 mmol/L    Anion gap 6 (L) 7 - 16 mmol/L    Glucose 76 65 - 100 mg/dL    BUN 29 (H) 8 - 23 MG/DL    Creatinine 1.27 (H) 0.6 - 1.0 MG/DL    GFR est AA 52 (L) >60 ml/min/1.73m2    GFR est non-AA 43 (L) >60 ml/min/1.73m2    Calcium 8.8 8.3 - 10.4 MG/DL       All Micro Results     Procedure Component Value Units Date/Time    COVID-19 RAPID TEST [[id number]]     Order Status: Sent     COVID-19 RAPID TEST [[id number]] Collected: 10/12/21 0904    Order Status: Completed Specimen: Nasopharyngeal Updated: 10/12/21 1014     Specimen source NASAL        COVID-19 rapid test Not detected        Comment:      The specimen is NEGATIVE for SARS-CoV-2, the novel coronavirus associated with COVID-19. A negative result does not rule out COVID-19.        This test has been authorized by the FDA under an Emergency Use Authorization (EUA) for use by authorized laboratories. Fact sheet for Healthcare Providers: ConventionUpdate.co.nz  Fact sheet for Patients: ConventionUpdate.co.nz       Methodology: Isothermal Nucleic Acid Amplification         CULTURE, URINE [[id number]]  (Abnormal)  (Susceptibility) Collected: 10/04/21 2107    Order Status: Completed Specimen: Cath Urine Updated: 10/07/21 0749     Special Requests: NO SPECIAL REQUESTS        Culture result:       >100,000 COLONIES/mL KLEBSIELLA PNEUMONIAE                Other Studies:  No results found.     Current Meds:  Current Facility-Administered Medications   Medication Dose Route Frequency    lactated Ringers infusion  125 mL/hr IntraVENous CONTINUOUS    [Held by provider] carvediloL (COREG) tablet 6.25 mg  6.25 mg Oral BID WITH MEALS    amLODIPine (NORVASC) tablet 10 mg  10 mg Oral DAILY    polyethylene glycol (MIRALAX) packet 17 g  17 g Oral DAILY    senna-docusate (PERICOLACE) 8.6-50 mg per tablet 2 Tablet  2 Tablet Oral DAILY    levothyroxine (SYNTHROID) tablet 150 mcg  150 mcg Oral ACB    escitalopram oxalate (LEXAPRO) 5 mg/5 mL oral solution soln 10 mg  10 mg Oral DAILY    meclizine (ANTIVERT) tablet 25 mg  25 mg Oral TID PRN    ondansetron (ZOFRAN) injection 8 mg  8 mg IntraVENous Q6H PRN    nystatin (MYCOSTATIN) 100,000 unit/gram powder   Topical BID    NUTRITIONAL SUPPORT ELECTROLYTE PRN ORDERS   Does Not Apply PRN    hydrALAZINE (APRESOLINE) 20 mg/mL injection 15 mg  15 mg IntraVENous Q4H PRN    central line flush (saline) syringe 10 mL  10 mL InterCATHeter Q8H    [Held by provider] furosemide (LASIX) injection 40 mg  40 mg IntraVENous BID    [Held by provider] QUEtiapine (SEROquel) tablet 12.5 mg  12.5 mg Oral QHS    acetaminophen (TYLENOL) tablet 650 mg  650 mg Oral Q4H PRN    levETIRAcetam (KEPPRA) 500 mg in 0.9% sodium chloride (MBP/ADV) 100 mL MBP  500 mg IntraVENous Q12H    divalproex (DEPAKOTE SPRINKLE) capsule 125 mg  125 mg Oral Q12H    LORazepam (ATIVAN) injection 1 mg  1 mg IntraVENous Q4H PRN    sodium chloride (NS) flush 5-40 mL  5-40 mL IntraVENous PRN       Signed:  Pamela Decker MD

## 2021-10-21 NOTE — PROGRESS NOTES
10/20/21 0640   NIH Stroke Scale   Interval Handoff/Transfer  (dual with Mena)   Level of Conciousness (1a) 0   LOC Questions (1b) (!) 2   LOC Commands (1c) 0   Best Gaze (2) 0   Visual (3) 0   Facial Palsy (4) 0   Motor Arm, Left (5a) 0   Motor Arm, Right (5b) 0   Motor Leg, Left (6a) (!) 2   Motor Leg, Right (6b) (!) 2   Limb Ataxia (7) 0   Sensory (8) 0   Best Language (9) 0   Dysarthria (10) 0   Extinction and Inattention (11) 0   Total 6

## 2021-10-21 NOTE — ACP (ADVANCE CARE PLANNING)
[facility] Hospitalist Service  At the heart of better care     Advance Care Planning   Admit Date:  10/3/2021  2:16 AM   Name:  Nils Chamberlain   Age:  78 y.o. Sex:  female  :  1940   MRN:  [id number]   Room:  [address] [place], [place] [address]anuj Almonte is able to make her own decisions: Yes    Names of POA/surrogate decision maker when patient is altered:  Mariam Edmond,     Other members present in the meeting:       Patient / surrogate decision-maker directed:  Code Status: FULL CODE -full aggressive medical and surgical interventions, including intubations, resuscitations, pressors, artificial tube feeding    She would like a week of maximum intervention for prognostication. Time spent: 21 minutes in direct discussion (face to face and/or over phone).     Signed:  Son Westfall MD

## 2021-10-21 NOTE — PROGRESS NOTES
Problem: Patient Education: Go to Patient Education Activity  Goal: Patient/Family Education  Outcome: Progressing Towards Goal     Problem: Hemorrhagic Stroke: Day 5 through Discharge  Goal: Off Pathway (Use only if patient is Off Pathway)  Outcome: Progressing Towards Goal  Goal: Activity/Safety  Outcome: Progressing Towards Goal  Goal: Consults, if ordered  Outcome: Progressing Towards Goal  Goal: Diagnostic Test/Procedures  Outcome: Progressing Towards Goal  Goal: Nutrition/Diet  Outcome: Progressing Towards Goal  Goal: Medications  Outcome: Progressing Towards Goal  Goal: Respiratory  Outcome: Progressing Towards Goal  Goal: Treatments/Interventions/Procedures  Outcome: Progressing Towards Goal  Goal: Psychosocial  Outcome: Progressing Towards Goal  Goal: *Hemodynamically stable  Outcome: Progressing Towards Goal  Goal: *Verbalizes anxiety and depression are reduced or absent  Outcome: Progressing Towards Goal  Goal: *Absence of aspiration  Outcome: Progressing Towards Goal  Goal: *Absence of signs and symptoms of DVT  Outcome: Progressing Towards Goal  Goal: *Optimal pain control at patient's stated goal  Outcome: Progressing Towards Goal  Goal: *Tolerating diet  Outcome: Progressing Towards Goal  Goal: *Progressive mobility and function  Outcome: Progressing Towards Goal  Goal: *Rehabilitation readiness  Outcome: Progressing Towards Goal     Problem: Hemorrhagic Stroke: Discharge Outcomes  Goal: *Verbalizes anxiety and depression are reduced or absent  Outcome: Progressing Towards Goal  Goal: *Verbalize understanding of risk factor modification(Stroke Metric)  Outcome: Progressing Towards Goal  Goal: *Optimal pain control at patient's stated goal  Outcome: Progressing Towards Goal  Goal: *Hemodynamically stable  Outcome: Progressing Towards Goal  Goal: *Absence of aspiration pneumonia  Outcome: Progressing Towards Goal  Goal: *Aware of needed dietary changes  Outcome: Progressing Towards Goal  Goal: *Verbalizes understanding and describes medication purposes and frequencies  Outcome: Progressing Towards Goal  Goal: *Tolerating diet  Outcome: Progressing Towards Goal  Goal: *Absence of signs and symptoms of DVT  Outcome: Progressing Towards Goal  Goal: *Absence of aspiration  Outcome: Progressing Towards Goal  Goal: *Progressive mobility and function  Outcome: Progressing Towards Goal  Goal: *Home safety concerns addressed  Outcome: Progressing Towards Goal     Problem: Falls - Risk of  Goal: *Absence of Falls  Description: Document Claire Fall Risk and appropriate interventions in the flowsheet.   Outcome: Progressing Towards Goal  Note: Fall Risk Interventions:  Mobility Interventions: Bed/chair exit alarm, Communicate number of staff needed for ambulation/transfer    Mentation Interventions: Bed/chair exit alarm, Door open when patient unattended    Medication Interventions: Bed/chair exit alarm    Elimination Interventions: Bed/chair exit alarm, Toileting schedule/hourly rounds    History of Falls Interventions: Bed/chair exit alarm, Door open when patient unattended         Problem: Patient Education: Go to Patient Education Activity  Goal: Patient/Family Education  Outcome: Progressing Towards Goal     Problem: Dysphagia (Adult)  Goal: *Speech Goal: (INSERT TEXT)  Outcome: Progressing Towards Goal     Problem: Patient Education: Go to Patient Education Activity  Goal: Patient/Family Education  Outcome: Progressing Towards Goal     Problem: Non-Violent Restraints  Goal: Removal from restraints as soon as assessed to be safe  Outcome: Progressing Towards Goal  Goal: No harm/injury to patient while restraints in use  Outcome: Progressing Towards Goal  Goal: Patient's dignity will be maintained  Outcome: Progressing Towards Goal  Goal: Patient Interventions  Outcome: Progressing Towards Goal     Problem: Pressure Injury - Risk of  Goal: *Prevention of pressure injury  Description: Document Romeo Scale and appropriate interventions in the flowsheet.   Outcome: Progressing Towards Goal     Problem: Patient Education: Go to Patient Education Activity  Goal: Patient/Family Education  Outcome: Progressing Towards Goal     Problem: Patient Education: Go to Patient Education Activity  Goal: Patient/Family Education  Outcome: Progressing Towards Goal     Problem: Delirium Treatment  Goal: *Level of consciousness restored to baseline  Outcome: Progressing Towards Goal  Goal: *Level of environmental perceptions restored to baseline  Outcome: Progressing Towards Goal  Goal: *Sensory perception restored to baseline  Outcome: Progressing Towards Goal  Goal: *Emotional stability restored to baseline  Outcome: Progressing Towards Goal  Goal: *Functional assessment restored to baseline  Outcome: Progressing Towards Goal  Goal: *Absence of falls  Outcome: Progressing Towards Goal  Goal: *Will remain free of delirium, CAM Score negative  Outcome: Progressing Towards Goal  Goal: *Cognitive status will be restored to baseline  Outcome: Progressing Towards Goal  Goal: Interventions  Outcome: Progressing Towards Goal     Problem: Patient Education: Go to Patient Education Activity  Goal: Patient/Family Education  Outcome: Progressing Towards Goal

## 2021-10-21 NOTE — PROGRESS NOTES
LTG: Patient will tolerate least restrictive diet without overt signs or symptoms of airway compromise. STG: Patient will participate modified barium swallow study to objectively assess swallow function. MET 10/15  STG: Patient will tolerate pureed diet and mildly thick liquids(Nectar) via single straw sip with 1:1 assistance. Added 10/15. Progressing 10/18. MET 10/19, reinstated 10/21  STG: Patient will participate in ongoing trials in efforts to advance diet. Added 10/15  SPEECH LANGUAGE PATHOLOGY: DYSPHAGIA- Daily Note 3    NAME/AGE/GENDER: Marko Maldonado is a 78 y.o. female  DATE: 10/21/2021  PRIMARY DIAGNOSIS: Intracranial bleed ([facility] [address] [I62.9]  Dementia ([facility] [address] [F03.90]  Poorly-controlled hypertension [I10]  Hypothyroidism [E03.9]  Encephalopathy, unspecified [G93.40]  Procedure(s) (LRB):  ESOPHAGOGASTRODUODENOSCOPY (EGD) with NG tube placement Room 715 (N/A) 9 Days Post-Op  ICD-10: Treatment Diagnosis: R13.12 Dysphagia, Oropharyngeal Phase    RECOMMENDATIONS   DIET:    Pureed   Mildly Thick Liquids (Nectar)by straw    MEDICATIONS: Crushed in applesauce as tolerated     ASPIRATION PRECAUTIONS  · Slow rate of intake  · Small bites/sips  · Upright at 90 degrees during meal     COMPENSATORY STRATEGIES/MODIFICATIONS  · Assistance with all intake     RECOMMENDATIONS for CONTINUED SPEECH THERAPY:   YES: Anticipate need for ongoing speech therapy during this hospitalization and at next level of care. ASSESSMENT   Patient continues to demonstrate poor oral intake that has been mainly a result of nausea, however today increased confusion biggest barrier. Patient taking miniscule sips/bites and required max cues to hold conversation and swallow. Perseverating on it being \"too much\" and \"choking\", despite minimal amount given and only s/sx of difficultly was the direct result of patient's swallow being significantly delayed due to constant talking and confusion.      Recommend continue puree and mildly thick by straw. Crush meds in applesauce. Needs 1:1 assistance. Will follow for ongoing dysphagia treatment and ongoing trials, however limited ability to functionally participate at this time. EDUCATION:   Recommendations discussed with Patient  CONTINUATION OF SKILLED SERVICES/MEDICAL NECESSITY:   Patient is expected to demonstrate progress in  swallow strength, swallow timeliness, swallow function, diet tolerance and swallow safety in order to  improve swallow safety, work toward diet advancement and decrease aspiration risk. REHABILITATION POTENTIAL FOR STATED GOALS: Good    PLAN    FREQUENCY/DURATION: Continue to follow patient 3 times a week for duration of hospital stay to address above goals. Recommendations for next treatment session: Next treatment session will address diet tolerance and po trials    SUBJECTIVE   Awake, talking to \"Michi\"(no one in  The room), meal tray hanging off table in lap, oxygen off, and stating \"you're feeding me too fast\" \"I need help\",  despite no one being in room and patient not eating. Oxygen Device: NC 2L  Pain: Pain Scale 1: Numeric (0 - 10)  Pain Intensity 1: 0    History of Present Injury/Illness: Ms. Fer Mccollum  has a past medical history of Endocrine disease, Hypertension, and Other ill-defined conditions(789.89). . She also  has no past surgical history on file. PRECAUTIONS/ALLERGIES: Patient has no known allergies. Problem List:  (Impairments causing functional limitations):  1. Oropharyngeal dysphagia     OBJECTIVE   Dysphagia treatment(diet tolerance)  Patient presented with mildly thick liquids and puree trials. patient stating \"too much\" \"that's enough\" \"that's choking me\" on every trial regardless of bolus size. needed significant verbal prompting to accept 1/2 spoonfuls of puree x3 as patient typically just putting lips to spoon accepting minuscule amount and taking small straw sip.   Wet vocal quality x1 with mildly thick liquids before the swallow palpated as patient talking with decreased awareness/attention of bolus due to mentation. Despite cues to swallow fast and hold conversation, patient constantly talking resulting in delayed oral transit and swallow upon palpation. No overt s/sx aspiration otherwise, but again only accepting verbal limited amount with mentation greatly interfering today. Tool Used: Dysphagia Outcome and Severity Scale (KEVIN)    Score Comments   Normal Diet  [] 7 With no strategies or extra time needed   Functional Swallow  [] 6 May have mild oral or pharyngeal delay   Mild Dysphagia  [] 5 Which may require one diet consistency restricted    Mild-Moderate Dysphagia  [x] 4 With 1-2 diet consistencies restricted   Moderate Dysphagia  [] 3 With 2 or more diet consistencies restricted   Moderate-Severe Dysphagia  [] 2 With partial PO strategies (trials with ST only)   Severe Dysphagia  [] 1 With inability to tolerate any PO safely      Score:  Initial: 3 Most Recent: 3 (Date 10/21/21 )   Interpretation of Tool: The Dysphagia Outcome and Severity Scale (KEVIN) is a simple, easy-to-use, 7-point scale developed to systematically rate the functional severity of dysphagia based on objective assessment and make recommendations for diet level, independence level, and type of nutrition.      INTERDISCIPLINARY COLLABORATION: n/a    After treatment position/precautions:  · Upright in bed  · Call light within reach    Total Treatment Duration:   Time In: 3218  Time Out: [address], [facility] TUTUO FANG REYES CCC-SLP

## 2021-10-21 NOTE — PROGRESS NOTES
10/21/21 0704   NIH Stroke Scale   Interval Handoff/Transfer  (dual with Samantha Cabrera)   Level of Conciousness (1a) 0   LOC Questions (1b) (!) 2   LOC Commands (1c) 0   Best Gaze (2) 0   Visual (3) 0   Facial Palsy (4) 0   Motor Arm, Left (5a) 0   Motor Arm, Right (5b) 0   Motor Leg, Left (6a) (!) 2   Motor Leg, Right (6b) (!) 2   Limb Ataxia (7) 0   Sensory (8) 0   Best Language (9) 0   Dysarthria (10) 0   Extinction and Inattention (11) 0   Total 6

## 2021-10-22 VITALS
SYSTOLIC BLOOD PRESSURE: 132 MMHG | TEMPERATURE: 97.7 F | HEIGHT: 65 IN | RESPIRATION RATE: 16 BRPM | HEART RATE: 57 BPM | DIASTOLIC BLOOD PRESSURE: 70 MMHG | OXYGEN SATURATION: 98 % | BODY MASS INDEX: 27.7 KG/M2 | WEIGHT: 166.23 LBS

## 2021-10-22 PROBLEM — I61.1 HEMORRHAGE OF RIGHT TEMPORAL LOBE (HCC): Status: RESOLVED | Noted: 2021-10-10 | Resolved: 2021-10-22

## 2021-10-22 PROBLEM — F03.918 DEMENTIA WITH BEHAVIORAL DISTURBANCE: Chronic | Status: ACTIVE | Noted: 2021-10-03

## 2021-10-22 PROBLEM — N18.31 CHRONIC KIDNEY DISEASE, STAGE 3A (HCC): Chronic | Status: ACTIVE | Noted: 2021-10-10

## 2021-10-22 PROBLEM — I48.0 PAF (PAROXYSMAL ATRIAL FIBRILLATION) (HCC): Chronic | Status: ACTIVE | Noted: 2021-10-11

## 2021-10-22 PROBLEM — I50.32 CHRONIC DIASTOLIC CONGESTIVE HEART FAILURE (HCC): Chronic | Status: ACTIVE | Noted: 2021-10-11

## 2021-10-22 PROBLEM — J90 PLEURAL EFFUSION, BILATERAL: Status: RESOLVED | Noted: 2021-10-11 | Resolved: 2021-10-22

## 2021-10-22 PROBLEM — G93.41 ACUTE METABOLIC ENCEPHALOPATHY: Status: RESOLVED | Noted: 2021-10-10 | Resolved: 2021-10-22

## 2021-10-22 PROBLEM — I10 POORLY-CONTROLLED HYPERTENSION: Status: RESOLVED | Noted: 2021-10-03 | Resolved: 2021-10-22

## 2021-10-22 LAB
ANION GAP SERPL CALC-SCNC: 4 MMOL/L (ref 7–16)
BUN SERPL-MCNC: 22 MG/DL (ref 8–23)
CALCIUM SERPL-MCNC: 8.5 MG/DL (ref 8.3–10.4)
CHLORIDE SERPL-SCNC: 107 MMOL/L (ref 98–107)
CO2 SERPL-SCNC: 28 MMOL/L (ref 21–32)
COVID-19 RAPID TEST, COVR: NOT DETECTED
CREAT SERPL-MCNC: 1.1 MG/DL (ref 0.6–1)
GLUCOSE SERPL-MCNC: 79 MG/DL (ref 65–100)
MAGNESIUM SERPL-MCNC: 1.9 MG/DL (ref 1.8–2.4)
PHOSPHATE SERPL-MCNC: 2.8 MG/DL (ref 2.3–3.7)
POTASSIUM SERPL-SCNC: 3.9 MMOL/L (ref 3.5–5.1)
SODIUM SERPL-SCNC: 139 MMOL/L (ref 136–145)
SOURCE, COVRS: NORMAL

## 2021-10-22 PROCEDURE — 74011250637 HC RX REV CODE- 250/637: Performed by: FAMILY MEDICINE

## 2021-10-22 PROCEDURE — 74011000258 HC RX REV CODE- 258: Performed by: STUDENT IN AN ORGANIZED HEALTH CARE EDUCATION/TRAINING PROGRAM

## 2021-10-22 PROCEDURE — 87635 SARS-COV-2 COVID-19 AMP PRB: CPT

## 2021-10-22 PROCEDURE — 83735 ASSAY OF MAGNESIUM: CPT

## 2021-10-22 PROCEDURE — 80048 BASIC METABOLIC PNL TOTAL CA: CPT

## 2021-10-22 PROCEDURE — 2709999900 HC NON-CHARGEABLE SUPPLY

## 2021-10-22 PROCEDURE — 84100 ASSAY OF PHOSPHORUS: CPT

## 2021-10-22 PROCEDURE — 74011250636 HC RX REV CODE- 250/636: Performed by: STUDENT IN AN ORGANIZED HEALTH CARE EDUCATION/TRAINING PROGRAM

## 2021-10-22 PROCEDURE — 97530 THERAPEUTIC ACTIVITIES: CPT

## 2021-10-22 PROCEDURE — 74011250637 HC RX REV CODE- 250/637: Performed by: INTERNAL MEDICINE

## 2021-10-22 PROCEDURE — 97535 SELF CARE MNGMENT TRAINING: CPT

## 2021-10-22 PROCEDURE — 97112 NEUROMUSCULAR REEDUCATION: CPT

## 2021-10-22 RX ORDER — POLYETHYLENE GLYCOL 3350 17 G/17G
17 POWDER, FOR SOLUTION ORAL DAILY PRN
Status: DISCONTINUED | OUTPATIENT
Start: 2021-10-22 | End: 2021-10-22 | Stop reason: HOSPADM

## 2021-10-22 RX ORDER — AMLODIPINE BESYLATE 10 MG/1
10 TABLET ORAL DAILY
Qty: 30 TABLET | Refills: 0 | Status: SHIPPED | OUTPATIENT
Start: 2021-10-23

## 2021-10-22 RX ADMIN — NYSTATIN: 100000 POWDER TOPICAL at 09:38

## 2021-10-22 RX ADMIN — SENNOSIDES AND DOCUSATE SODIUM 2 TABLET: 8.6; 5 TABLET ORAL at 09:36

## 2021-10-22 RX ADMIN — LEVETIRACETAM 500 MG: 100 INJECTION, SOLUTION INTRAVENOUS at 05:44

## 2021-10-22 RX ADMIN — LEVOTHYROXINE SODIUM 150 MCG: 0.07 TABLET ORAL at 05:44

## 2021-10-22 RX ADMIN — Medication 10 ML: at 15:25

## 2021-10-22 RX ADMIN — POLYETHYLENE GLYCOL 3350 17 G: 17 POWDER, FOR SOLUTION ORAL at 09:36

## 2021-10-22 RX ADMIN — ESCITALOPRAM OXALATE 10 MG: 5 SOLUTION ORAL at 09:35

## 2021-10-22 RX ADMIN — Medication 10 ML: at 05:50

## 2021-10-22 RX ADMIN — AMLODIPINE BESYLATE 10 MG: 10 TABLET ORAL at 09:36

## 2021-10-22 NOTE — PROGRESS NOTES
Facility contacted [facility] and was told the auth request is still pending. SW spoke with pt's dtr, Martha, via phone, this morning and provided an update. 1511: Insurance approval received. Auth #  [id number]. Next review date 10/28/2021. Facility and family notified. Pt is medically cleared for dc today and will transfer to room 325B at [facility] for STR services. RN to call report to #[phone]. Transport scheduled for 1600 through [employer]. Pt's spouse is at the bedside and aware of pt's dc and transport time. SW remains available to assist as needed. Care Management Interventions  PCP Verified by CM: Yes (Ольга Avitia *)  Mode of Transport at Discharge: [address]  [address] Time of Discharge: 1600  Transition of Care Consult (CM Consult): SNF  Partner SNF: No  Reason Why Partner SNF Not Chosen: Positive previous encounter  Discharge Durable Medical Equipment: No  Physical Therapy Consult: Yes  Occupational Therapy Consult: Yes  Speech Therapy Consult: Yes  Support Systems: Child(luis fernando), Spouse/Significant Other  Confirm Follow Up Transport: Family  The Plan for Transition of Care is Related to the Following Treatment Goals : STR to improve pt's strength and functional abilities for a safe transition to home.   The Patient and/or Patient Representative was Provided with a Choice of Provider and Agrees with the Discharge Plan?: Yes  Freedom of Choice List was Provided with Basic Dialogue that Supports the Patient's Individualized Plan of Care/Goals, Treatment Preferences and Shares the Quality Data Associated with the Providers?: Yes  [place] Resource Information Provided?: No  Discharge Location  Discharge Placement: Rehab Unit Subacute ([facility])

## 2021-10-22 NOTE — PROGRESS NOTES
ACUTE OT GOALS:  (Developed with and agreed upon by patient and/or caregiver.)  NEW GOALS AT RE-EVALUATION 10/20/21  1. Patient will complete upper body bathing and dressing with SET UP and adaptive equipment as needed. 2. Patient will complete toileting with MIN A X1.   3. Patient will complete grooming ADL with SET UP.  4. Patient will tolerate 25 minutes of OT treatment with 1-2 rest breaks to increase activity tolerance for ADLs. 5. Patient will complete functional transfers with CGA X1 and adaptive equipment as needed. 6. Patient will tolerate 10 minutes BUE exercises to increase strength for safe, functional transfers.      Timeframe: 7 visits     OCCUPATIONAL THERAPY: Daily Note OT Treatment Day # 2    Zay Mcdowell is a 78 y.o. female   PRIMARY DIAGNOSIS: Hemorrhage of right temporal lobe (HCC)  Intracranial bleed ([facility] [address] [I62.9]  Dementia ([facility] [address] [F03.90]  Poorly-controlled hypertension [I10]  Hypothyroidism [E03.9]  Encephalopathy, unspecified [G93.40]  Procedure(s) (LRB):  ESOPHAGOGASTRODUODENOSCOPY (EGD) with NG tube placement Room 715 (N/A)  10 Days Post-Op  Payor: [employer] MEDICARE / Plan: Via [employer] / Product Type: [employer] Medicare /   ASSESSMENT:     REHAB RECOMMENDATIONS: CURRENT LEVEL OF FUNCTION:  (Most Recently Demonstrated)   Recommendation to date pending progress:  Setting:   Short-term Rehab  Equipment:    None pt has all needs at home Bathing:   Not tested  Dressing:   Not tested  Feeding/Grooming:   Moderate Assistance for washing/combing hair  Toileting:   Not tested  Functional Mobility:   Minimal Assistance x 2-Moderate A x2     ASSESSMENT:  Ms. Julio Banuelos is doing fair today. Pt required min/mod A to complete supine to sit transfer. At edge of bed, patient demonstrates fair sitting balance while performing grooming task. Pt required mod Ax 2 for standing and transfers today. Pt showing improvement in being on her feet today.  Pt did c/o nausea and continues to limit her. Pt left in recliner with PT in room. Minimal progress made and good effort appreciated. Pt is to be discharged to SNF today.      SUBJECTIVE:   Ms. Jayce Vallejo states, \"I feel better today\"    SOCIAL HISTORY/LIVING ENVIRONMENT: lives with , one level home, 2STE, tub shower w/ SC, rollator, RW and SPC, 2 recent falls  Support Systems: Spouse/Significant Other [facility] [phone] (spouse))    OBJECTIVE:     PAIN: VITAL SIGNS: LINES/DRAINS:   Pre Treatment: Pain Screen  Pain Scale 1: Numeric (0 - 10)  Pain Intensity 1: 0  Post Treatment: 0   IV and Purewick  O2 Device: Hi flow nasal cannula     ACTIVITIES OF DAILY LIVING: I Mod I S SBA CGA Min Mod Max Total NT Comments   BASIC ADLs:              Bathing/ Showering [] [] [] [] [] [] [] [] [] [x]    Toileting [] [] [] [] [] [] [] [x] [] [] For bladder hygiene   Dressing [] [] [] [] [] [] [] [] [] [x]    Feeding [] [] [] [] [] [] [] [] [x] [] For brief management   Grooming [] [] [] [] [] [] [] [x] [] []    Personal Device Care [] [] [] [] [] [] [] [] [] [x]    Functional Mobility [] [] [] [] [] [] [x] [] [] [] x2 RW   I=Independent, Mod I=Modified Independent, S=Supervision, SBA=Standby Assistance, CGA=Contact Guard Assistance,   Min=Minimal Assistance, Mod=Moderate Assistance, Max=Maximal Assistance, Total=Total Assistance, NT=Not Tested    MOBILITY: I Mod I S SBA CGA Min Mod Max Total  NT x2 Comments:   Supine to sit [] [] [] [] [] [x] [] [] [] [] []    Sit to supine [] [] [] [] [] [] [] [] [] [x] []    Sit to stand [] [] [] [] [] [x] [x] [] [] [] [x] RW   Bed to chair [] [] [] [] [] [x] [x] [] [] [] [x] RW   I=Independent, Mod I=Modified Independent, S=Supervision, SBA=Standby Assistance, CGA=Contact Guard Assistance,   Min=Minimal Assistance, Mod=Moderate Assistance, Max=Maximal Assistance, Total=Total Assistance, NT=Not Tested    BALANCE: Good Fair+ Fair Fair- Poor NT Comments   Sitting Static [] [] [x] [] [] [] Leaning on RW   Sitting Dynamic [] [] [x] [] [] []              Standing Static [] [] [x] [] [] [] CGA RW   Standing Dynamic [] [] [] [x] [] [] Min-mod A x2 RW steps chair>bed     PLAN:   FREQUENCY/DURATION: OT Plan of Care: 3 times/week for duration of hospital stay or until stated goals are met, whichever comes first.    TREATMENT:   TREATMENT:   ($$ Self Care/Home Management: 8-22 mins$$ Neuromuscular Re-Education: 23-37 mins   )  Co-Treatment PT/OT necessary due to patient's decreased overall endurance/tolerance levels, as well as need for high level skilled assistance to complete functional transfers/mobility and functional tasks  Self Care (15 Minutes): Self care including Toileting and Grooming to increase independence and decrease level of assistance required. Neuromuscular Re-education (25 Minutes): Neuromuscular Re-education included Balance Training, Coordination training, Functional mobility with facilitation, Postural training, Sitting balance training and Standing balance training to improve Balance, Coordination, Functional Mobility and Postural Control.      TREATMENT GRID:  N/A    AFTER TREATMENT POSITION/PRECAUTIONS:  Chair, Needs within reach and RN notified    INTERDISCIPLINARY COLLABORATION:  RN/PCT, PT/PTA and OT/ALMEIDA    TOTAL TREATMENT DURATION:  OT Patient Time In/Time Out  Time In: 0915  Time Out: [facility] , SATH[place]

## 2021-10-22 NOTE — PROGRESS NOTES
TRANSFER - OUT REPORT:    Verbal report given to Marcelino Christianson RN on [employer]rol Holding  being transferred to [address] for routine progression of care       Report consisted of patients Situation, Background, Assessment and   Recommendations(SBAR). Information from the following report(s) SBAR was reviewed with the receiving nurse. Opportunity for questions and clarification was provided.       Patient transported with:  [facility]

## 2021-10-22 NOTE — PROGRESS NOTES
SPEECH PATHOLOGY NOTE:    Patient with PT at time of attempt this am. Will check back at later time/date as schedule permits.      Dylon [address] CCC-SLP

## 2021-10-22 NOTE — PROGRESS NOTES
ACUTE PHYSICAL THERAPY GOALS:  (Developed with and agreed upon by patient and/or caregiver. )  LTG: (Reviewed and updated 10/20/21)  1. Patient will perform bed mobility with CONTACT GUARD ASSISTANCE within 7 days. 2. Patient will transfer bed to chair with CONTACT GUARD ASSISTANCE within 7 days. 3. Patient will demonstrate GOOD DYNAMIC SITTING balance within 7 day(s). 4. Patient will ambulate 25ft+ using least restrictive assistive device and MINIMAL ASSISTANCE within 7 days. 5. Patient will tolerate 25+ minutes of therapeutic activity/exercise and/or neuromuscular re-education while maintaining stable vitals to improve functional strength and activity tolerance within 7 days. 6. Patient will perform standing activities and exercises for 5+ minutes to improve mobility and balance within 7 days. PHYSICAL THERAPY: Daily Note and AM Treatment Day # 2    Aliza Sheikh is a 78 y.o. female   PRIMARY DIAGNOSIS: Hemorrhage of right temporal lobe (HCC)  Intracranial bleed ([facility] [address] [I62.9]  Dementia ([facility] [address] [F03.90]  Poorly-controlled hypertension [I10]  Hypothyroidism [E03.9]  Encephalopathy, unspecified [G93.40]  Procedure(s) (LRB):  ESOPHAGOGASTRODUODENOSCOPY (EGD) with NG tube placement Room 715 (N/A)  10 Days Post-Op    ASSESSMENT:     REHAB RECOMMENDATIONS: CURRENT LEVEL OF FUNCTION:  (Most Recently Demonstrated)   Recommendation to date pending progress:  Setting:   Short-term Rehab  Equipment:    To Be Determined Bed Mobility:   Moderate Assistance  Sit to Stand:   Moderate Assistance x 2  Transfers:   Moderate Assistance x 2  Gait/Mobility:   Not tested     ASSESSMENT:  Ms. Zarina Saucedo presents sitting up with ALMEIDA at bedside. She does still c/o some nausea but is agreeable to therapy treatment and mobility. Practiced sit-stand transfers with mostly min assist of 2 (briefly mod) and cues for hand/foot placement, technique.  Worked on standing static/dynamic and pre-gait activities, steps to chair with RW, and additional transfer trials from chair. Pt fatigues quickly and needs a few breaks due to nausea. Performs LE exercises in chair with good participation. Making slow steady progress towards goals. Will need rehab at [facility]. SUBJECTIVE:   Ms. Jayce Vallejo states, \"when I move I get sick\"    SOCIAL HISTORY/ LIVING ENVIRONMENT: Ambulatory with a SPC and spouse assists with ADLs PRN.    Support Systems: Spouse/Significant Other [facility] [phone] (spouse))  OBJECTIVE:     PAIN: VITAL SIGNS: LINES/DRAINS:   Pre Treatment: Pain Screen  Pain Scale 1: Numeric (0 - 10)  Pain Intensity 1: 0  Post Treatment: 0/10 Vital Signs  O2 Device: Hi flow nasal cannula IV  O2 Device: Hi flow nasal cannula     MOBILITY: I Mod I S SBA CGA Min Mod Max Total  NT x2 Comments:   Bed Mobility    Rolling [] [] [] [] [] [] [] [] [] [] []    Supine to Sit [] [] [] [] [] [] [] [] [] [] []    Scooting [] [] [] [] [] [] [] [] [] [] []    Sit to Supine [] [] [] [] [] [] [] [] [] [] []    Transfers    Sit to Stand [] [] [] [] [] [x] [x] [] [] [] [x]    Bed to Chair [] [] [] [] [] [x] [] [] [] [] [x]    Stand to Sit [] [] [] [] [] [x] [] [] [] [] [x]    I=Independent, Mod I=Modified Independent, S=Supervision, SBA=Standby Assistance, CGA=Contact Guard Assistance,   Min=Minimal Assistance, Mod=Moderate Assistance, Max=Maximal Assistance, Total=Total Assistance, NT=Not Tested    BALANCE: Good Fair+ Fair Fair- Poor NT Comments   Sitting Static [] [x] [] [] [] []    Sitting Dynamic [] [x] [] [] [] []              Standing Static [] [] [x] [] [] []    Standing Dynamic [] [] [x] [] [] []      GAIT: I Mod I S SBA CGA Min Mod Max Total  NT x2 Comments:   Level of Assistance [] [] [] [] [] [x] [] [] [] [] [x]    Distance 6'    DME Rolling Walker    Gait Quality Slow, delayed, forward flexed posture    Weightbearing  Status N/A     I=Independent, Mod I=Modified Independent, S=Supervision, SBA=Standby Assistance, CGA=Contact Guard Assistance,   Min=Minimal Assistance, Mod=Moderate Assistance, Max=Maximal Assistance, Total=Total Assistance, NT=Not Tested    PLAN:   FREQUENCY/DURATION: PT Plan of Care: 3 times/week for duration of hospital stay or until stated goals are met, whichever comes first.  TREATMENT:     TREATMENT:   ($$ Therapeutic Activity: 23-37 mins    )  Therapeutic Activity (23 Minutes): Therapeutic activity included Scooting, Transfer Training, Ambulation on level ground, Sitting balance , Standing balance and standing static/dynamic activities, seated LE exercises to improve functional Mobility, Strength, Activity tolerance and balance.        TREATMENT GRID:  N/A    AFTER TREATMENT POSITION/PRECAUTIONS:  Alarm Activated, Chair, Needs within reach and RN notified    INTERDISCIPLINARY COLLABORATION:  RN/PCT, PT/PTA and OT/ALMEIDA    TOTAL TREATMENT DURATION:  PT Patient Time In/Time Out  Time In: 0940  Time Out: Albina Brown, LUIZ

## 2021-10-22 NOTE — PROGRESS NOTES
Problem: Patient Education: Go to Patient Education Activity  Goal: Patient/Family Education  Outcome: Progressing Towards Goal     Problem: Hemorrhagic Stroke: Day 5 through Discharge  Goal: Off Pathway (Use only if patient is Off Pathway)  Outcome: Progressing Towards Goal  Goal: Activity/Safety  Outcome: Progressing Towards Goal  Goal: Consults, if ordered  Outcome: Progressing Towards Goal  Goal: Diagnostic Test/Procedures  Outcome: Progressing Towards Goal  Goal: Nutrition/Diet  Outcome: Progressing Towards Goal  Goal: Medications  Outcome: Progressing Towards Goal  Goal: Respiratory  Outcome: Progressing Towards Goal  Goal: Treatments/Interventions/Procedures  Outcome: Progressing Towards Goal  Goal: Psychosocial  Outcome: Progressing Towards Goal  Goal: *Hemodynamically stable  Outcome: Progressing Towards Goal  Goal: *Verbalizes anxiety and depression are reduced or absent  Outcome: Progressing Towards Goal  Goal: *Absence of aspiration  Outcome: Progressing Towards Goal  Goal: *Absence of signs and symptoms of DVT  Outcome: Progressing Towards Goal  Goal: *Optimal pain control at patient's stated goal  Outcome: Progressing Towards Goal  Goal: *Tolerating diet  Outcome: Progressing Towards Goal  Goal: *Progressive mobility and function  Outcome: Progressing Towards Goal  Goal: *Rehabilitation readiness  Outcome: Progressing Towards Goal     Problem: Hemorrhagic Stroke: Discharge Outcomes  Goal: *Verbalizes anxiety and depression are reduced or absent  Outcome: Progressing Towards Goal  Goal: *Verbalize understanding of risk factor modification(Stroke Metric)  Outcome: Progressing Towards Goal  Goal: *Optimal pain control at patient's stated goal  Outcome: Progressing Towards Goal  Goal: *Hemodynamically stable  Outcome: Progressing Towards Goal  Goal: *Absence of aspiration pneumonia  Outcome: Progressing Towards Goal  Goal: *Aware of needed dietary changes  Outcome: Progressing Towards Goal  Goal: *Verbalizes understanding and describes medication purposes and frequencies  Outcome: Progressing Towards Goal  Goal: *Tolerating diet  Outcome: Progressing Towards Goal  Goal: *Absence of signs and symptoms of DVT  Outcome: Progressing Towards Goal  Goal: *Absence of aspiration  Outcome: Progressing Towards Goal  Goal: *Progressive mobility and function  Outcome: Progressing Towards Goal  Goal: *Home safety concerns addressed  Outcome: Progressing Towards Goal     Problem: Falls - Risk of  Goal: *Absence of Falls  Description: Document Claire Fall Risk and appropriate interventions in the flowsheet. Outcome: Progressing Towards Goal  Note: Fall Risk Interventions:  Mobility Interventions: Bed/chair exit alarm    Mentation Interventions: Bed/chair exit alarm    Medication Interventions: Bed/chair exit alarm    Elimination Interventions: Bed/chair exit alarm    History of Falls Interventions: Bed/chair exit alarm         Problem: Patient Education: Go to Patient Education Activity  Goal: Patient/Family Education  Outcome: Progressing Towards Goal     Problem: Dysphagia (Adult)  Goal: *Speech Goal: (INSERT TEXT)  Outcome: Progressing Towards Goal     Problem: Patient Education: Go to Patient Education Activity  Goal: Patient/Family Education  Outcome: Progressing Towards Goal     Problem: Non-Violent Restraints  Goal: Removal from restraints as soon as assessed to be safe  Outcome: Progressing Towards Goal  Goal: No harm/injury to patient while restraints in use  Outcome: Progressing Towards Goal  Goal: Patient's dignity will be maintained  Outcome: Progressing Towards Goal  Goal: Patient Interventions  Outcome: Progressing Towards Goal     Problem: Pressure Injury - Risk of  Goal: *Prevention of pressure injury  Description: Document Romeo Scale and appropriate interventions in the flowsheet.   Outcome: Progressing Towards Goal     Problem: Patient Education: Go to Patient Education Activity  Goal: Patient/Family Education  Outcome: Progressing Towards Goal     Problem: Patient Education: Go to Patient Education Activity  Goal: Patient/Family Education  Outcome: Progressing Towards Goal     Problem: Delirium Treatment  Goal: *Level of consciousness restored to baseline  Outcome: Progressing Towards Goal  Goal: *Level of environmental perceptions restored to baseline  Outcome: Progressing Towards Goal  Goal: *Sensory perception restored to baseline  Outcome: Progressing Towards Goal  Goal: *Emotional stability restored to baseline  Outcome: Progressing Towards Goal  Goal: *Functional assessment restored to baseline  Outcome: Progressing Towards Goal  Goal: *Absence of falls  Outcome: Progressing Towards Goal  Goal: *Will remain free of delirium, CAM Score negative  Outcome: Progressing Towards Goal  Goal: *Cognitive status will be restored to baseline  Outcome: Progressing Towards Goal  Goal: Interventions  Outcome: Progressing Towards Goal     Problem: Patient Education: Go to Patient Education Activity  Goal: Patient/Family Education  Outcome: Progressing Towards Goal

## 2021-10-22 NOTE — PROGRESS NOTES
10/21/21 1910   NIH Stroke Scale   Interval Other (comment)  (NIH w/ [place], RN)   Level of Conciousness (1a) 0   LOC Questions (1b) (!) 2   LOC Commands (1c) 0   Best Gaze (2) 0   Visual (3) 0   Facial Palsy (4) 0   Motor Arm, Left (5a) 0   Motor Arm, Right (5b) 0   Motor Leg, Left (6a) (!) 2   Motor Leg, Right (6b) (!) 2   Limb Ataxia (7) 0   Sensory (8) 0   Best Language (9) 0   Dysarthria (10) 0   Extinction and Inattention (11) 0   Total 6

## 2021-10-22 NOTE — PROGRESS NOTES
10/22/21 0656   NIH Stroke Scale   Interval Handoff/Transfer  (dual with [place])   Level of Conciousness (1a) 0   LOC Questions (1b) (!) 2   LOC Commands (1c) 0   Best Gaze (2) 0   Visual (3) 0   Facial Palsy (4) 0   Motor Arm, Left (5a) 0   Motor Arm, Right (5b) 0   Motor Leg, Left (6a) (!) 2   Motor Leg, Right (6b) (!) 2   Limb Ataxia (7) 0   Sensory (8) 0   Best Language (9) 0   Dysarthria (10) 0   Extinction and Inattention (11) 0   Total 6

## 2021-11-01 ENCOUNTER — APPOINTMENT (OUTPATIENT)
Dept: GENERAL RADIOLOGY | Age: 81
End: 2021-11-01
Attending: EMERGENCY MEDICINE
Payer: MEDICARE

## 2021-11-01 ENCOUNTER — HOSPITAL ENCOUNTER (EMERGENCY)
Age: 81
Discharge: HOME | End: 2021-11-01
Attending: EMERGENCY MEDICINE
Payer: MEDICARE

## 2021-11-01 VITALS
HEIGHT: 66 IN | RESPIRATION RATE: 18 BRPM | DIASTOLIC BLOOD PRESSURE: 68 MMHG | BODY MASS INDEX: 23.46 KG/M2 | HEART RATE: 82 BPM | TEMPERATURE: 98.1 F | WEIGHT: 146 LBS | OXYGEN SATURATION: 94 % | SYSTOLIC BLOOD PRESSURE: 115 MMHG

## 2021-11-01 DIAGNOSIS — K59.00 CONSTIPATION, UNSPECIFIED CONSTIPATION TYPE: ICD-10-CM

## 2021-11-01 DIAGNOSIS — R11.0 NAUSEA WITHOUT VOMITING: ICD-10-CM

## 2021-11-01 DIAGNOSIS — N39.0 ACUTE UTI: Primary | ICD-10-CM

## 2021-11-01 LAB
ALBUMIN SERPL-MCNC: 3 G/DL (ref 3.2–4.6)
ALBUMIN/GLOB SERPL: 0.8 {RATIO} (ref 1.2–3.5)
ALP SERPL-CCNC: 85 U/L (ref 50–136)
ALT SERPL-CCNC: 12 U/L (ref 12–65)
ANION GAP SERPL CALC-SCNC: 6 MMOL/L (ref 7–16)
AST SERPL-CCNC: 12 U/L (ref 15–37)
BACTERIA URNS QL MICRO: NORMAL /HPF
BASOPHILS # BLD: 0 K/UL (ref 0–0.2)
BASOPHILS NFR BLD: 0 % (ref 0–2)
BILIRUB SERPL-MCNC: 0.9 MG/DL (ref 0.2–1.1)
BUN SERPL-MCNC: 29 MG/DL (ref 8–23)
CALCIUM SERPL-MCNC: 9.1 MG/DL (ref 8.3–10.4)
CASTS URNS QL MICRO: NORMAL /LPF
CHLORIDE SERPL-SCNC: 105 MMOL/L (ref 98–107)
CO2 SERPL-SCNC: 27 MMOL/L (ref 21–32)
CREAT SERPL-MCNC: 1.32 MG/DL (ref 0.6–1)
DIFFERENTIAL METHOD BLD: ABNORMAL
EOSINOPHIL # BLD: 0.1 K/UL (ref 0–0.8)
EOSINOPHIL NFR BLD: 2 % (ref 0.5–7.8)
EPI CELLS #/AREA URNS HPF: 0 /HPF
ERYTHROCYTE [DISTWIDTH] IN BLOOD BY AUTOMATED COUNT: 13.2 % (ref 11.9–14.6)
GLOBULIN SER CALC-MCNC: 3.9 G/DL (ref 2.3–3.5)
GLUCOSE SERPL-MCNC: 106 MG/DL (ref 65–100)
HCT VFR BLD AUTO: 35.7 % (ref 35.8–46.3)
HGB BLD-MCNC: 12.1 G/DL (ref 11.7–15.4)
IMM GRANULOCYTES # BLD AUTO: 0 K/UL (ref 0–0.5)
IMM GRANULOCYTES NFR BLD AUTO: 0 % (ref 0–5)
LACTATE SERPL-SCNC: 1.1 MMOL/L (ref 0.4–2)
LIPASE SERPL-CCNC: 73 U/L (ref 73–393)
LYMPHOCYTES # BLD: 1.3 K/UL (ref 0.5–4.6)
LYMPHOCYTES NFR BLD: 17 % (ref 13–44)
MCH RBC QN AUTO: 31.3 PG (ref 26.1–32.9)
MCHC RBC AUTO-ENTMCNC: 33.9 G/DL (ref 31.4–35)
MCV RBC AUTO: 92.5 FL (ref 79.6–97.8)
MONOCYTES # BLD: 1.2 K/UL (ref 0.1–1.3)
MONOCYTES NFR BLD: 17 % (ref 4–12)
NEUTS SEG # BLD: 4.7 K/UL (ref 1.7–8.2)
NEUTS SEG NFR BLD: 64 % (ref 43–78)
NRBC # BLD: 0 K/UL (ref 0–0.2)
PLATELET # BLD AUTO: 221 K/UL (ref 150–450)
PMV BLD AUTO: 10.5 FL (ref 9.4–12.3)
POTASSIUM SERPL-SCNC: 3.4 MMOL/L (ref 3.5–5.1)
PROT SERPL-MCNC: 6.9 G/DL (ref 6.3–8.2)
RBC # BLD AUTO: 3.86 M/UL (ref 4.05–5.2)
RBC #/AREA URNS HPF: NORMAL /HPF
SODIUM SERPL-SCNC: 138 MMOL/L (ref 136–145)
WBC # BLD AUTO: 7.4 K/UL (ref 4.3–11.1)
WBC URNS QL MICRO: NORMAL /HPF

## 2021-11-01 PROCEDURE — 83605 ASSAY OF LACTIC ACID: CPT

## 2021-11-01 PROCEDURE — 74011250637 HC RX REV CODE- 250/637: Performed by: NURSE PRACTITIONER

## 2021-11-01 PROCEDURE — 99285 EMERGENCY DEPT VISIT HI MDM: CPT

## 2021-11-01 PROCEDURE — 81015 MICROSCOPIC EXAM OF URINE: CPT

## 2021-11-01 PROCEDURE — 74022 RADEX COMPL AQT ABD SERIES: CPT

## 2021-11-01 PROCEDURE — 80053 COMPREHEN METABOLIC PANEL: CPT

## 2021-11-01 PROCEDURE — 81003 URINALYSIS AUTO W/O SCOPE: CPT

## 2021-11-01 PROCEDURE — 83690 ASSAY OF LIPASE: CPT

## 2021-11-01 PROCEDURE — 85025 COMPLETE CBC W/AUTO DIFF WBC: CPT

## 2021-11-01 RX ORDER — CEPHALEXIN 500 MG/1
500 CAPSULE ORAL 2 TIMES DAILY
Qty: 14 CAPSULE | Refills: 0 | Status: SHIPPED | OUTPATIENT
Start: 2021-11-01 | End: 2021-11-08

## 2021-11-01 RX ORDER — SODIUM CHLORIDE 0.9 % (FLUSH) 0.9 %
5-10 SYRINGE (ML) INJECTION EVERY 8 HOURS
Status: DISCONTINUED | OUTPATIENT
Start: 2021-11-01 | End: 2021-11-02 | Stop reason: HOSPADM

## 2021-11-01 RX ORDER — CEPHALEXIN 500 MG/1
500 CAPSULE ORAL
Status: COMPLETED | OUTPATIENT
Start: 2021-11-01 | End: 2021-11-01

## 2021-11-01 RX ORDER — POLYETHYLENE GLYCOL 3350 17 G/17G
17 POWDER, FOR SOLUTION ORAL DAILY
Qty: 235 G | Refills: 0 | Status: SHIPPED | OUTPATIENT
Start: 2021-11-01

## 2021-11-01 RX ORDER — ONDANSETRON 4 MG/1
4 TABLET, ORALLY DISINTEGRATING ORAL
Qty: 12 TABLET | Refills: 0 | Status: SHIPPED | OUTPATIENT
Start: 2021-11-01

## 2021-11-01 RX ORDER — SODIUM CHLORIDE 0.9 % (FLUSH) 0.9 %
5-10 SYRINGE (ML) INJECTION AS NEEDED
Status: DISCONTINUED | OUTPATIENT
Start: 2021-11-01 | End: 2021-11-02 | Stop reason: HOSPADM

## 2021-11-01 RX ADMIN — CEPHALEXIN 500 MG: 500 CAPSULE ORAL at 19:13

## 2021-11-01 RX ADMIN — Medication 10 ML: at 19:13

## 2021-11-01 NOTE — ED TRIAGE NOTES
Pt arrives via EMS from [facility] abdominal pain. Pain is intermittent x 2-3 months. EMS reports they were sending for CT scan.

## 2021-11-01 NOTE — ED PROVIDER NOTES
78-year-old female with a history of dementia brought by EMS from a skilled nursing facility. Triage note states that patient was sent for complaint of intermittent abdominal pain for 2-3 months. Nurse at the facility that I spoke with states that patient has not had any pain but has just had intermittent nausea. Patient denies any pain at this time. The history is provided by the nursing home. Nausea   This is a new problem. The current episode started more than 1 week ago. There has been no fever. Pertinent negatives include no fever, no abdominal pain, no diarrhea and no cough. Past Medical History:   Diagnosis Date    Acute metabolic encephalopathy 56/10/7691    Endocrine disease     hypothyroidism    Hemorrhage of right temporal lobe ([facility] [address] 10/10/2021    Hypertension     Other ill-defined conditions(799.89)     chronic back pain.  Pleural effusion, bilateral 10/11/2021    Poorly-controlled hypertension 10/3/2021       No past surgical history on file. No family history on file.     Social History     Socioeconomic History    Marital status:      Spouse name: Not on file    Number of children: Not on file    Years of education: Not on file    Highest education level: Not on file   Occupational History    Not on file   Tobacco Use    Smoking status: Never Smoker    Smokeless tobacco: Never Used   Substance and Sexual Activity    Alcohol use: No    Drug use: No    Sexual activity: Not on file   Other Topics Concern     Service Not Asked    Blood Transfusions Not Asked    Caffeine Concern Not Asked    Occupational Exposure Not Asked    Hobby Hazards Not Asked    Sleep Concern Not Asked    Stress Concern Not Asked    Weight Concern Not Asked    Special Diet Not Asked    Back Care Not Asked    Exercise Not Asked    Bike Helmet Not Asked   [address] Not Asked    Self-Exams Not Asked   Social History Narrative    Not on file     Social Determinants of Health     Financial Resource Strain:     Difficulty of Paying Living Expenses:    Food Insecurity:     Worried About Running Out of Food in the Last Year:     [address] in the Last Year:    Transportation Needs:     Lack of Transportation (Medical):  Lack of Transportation (Non-Medical):    Physical Activity:     Days of Exercise per Week:     Minutes of Exercise per Session:    Stress:     Feeling of Stress :    Social Connections:     Frequency of Communication with Friends and Family:     Frequency of Social Gatherings with Friends and Family:     Attends Confucianism Services:     Active Member of Clubs or Organizations:     Attends Club or Organization Meetings:     Marital Status:    Intimate Partner Violence:     Fear of Current or Ex-Partner:     Emotionally Abused:     Physically Abused:     Sexually Abused: ALLERGIES: Adhesive tape-silicones, Tramadol, Trazodone, and Zolpidem    Review of Systems   Unable to perform ROS: Dementia (ROS as reported from staff at [facility])   Constitutional: Negative for fever. Respiratory: Negative for cough. Gastrointestinal: Positive for nausea. Negative for abdominal pain and diarrhea. Genitourinary: Positive for dysuria. All other systems reviewed and are negative. Vitals:    11/01/21 1531 11/01/21 2040   BP: 114/66 115/68   Pulse: 84 82   Resp: 18 18   Temp: 98.1 °F (36.7 °C) 98.1 °F (36.7 °C)   SpO2: 93% 94%   Weight: 66.2 kg (146 lb)    Height: 5' 6\" (1.676 m)             Physical Exam  Vitals and nursing note reviewed. Constitutional:       General: She is not in acute distress. Appearance: She is well-developed. She is not ill-appearing, toxic-appearing or diaphoretic. HENT:      Head: Normocephalic and atraumatic. Mouth/Throat:      Mouth: Mucous membranes are moist.      Pharynx: Oropharynx is clear. Eyes:      Extraocular Movements: Extraocular movements intact.       Pupils: Pupils are equal, round, and reactive to light. Cardiovascular:      Rate and Rhythm: Normal rate. Heart sounds: Normal heart sounds. Pulmonary:      Effort: Pulmonary effort is normal. No respiratory distress. Breath sounds: Normal breath sounds. No wheezing. Abdominal:      General: Abdomen is flat. Bowel sounds are normal.      Palpations: Abdomen is soft. Tenderness: There is no abdominal tenderness. Comments: Abdomen is soft and nontender. Bowel sounds normal.   Skin:     General: Skin is warm and dry. Capillary Refill: Capillary refill takes less than 2 seconds. Neurological:      General: No focal deficit present. Mental Status: She is alert. Comments: Baseline dementia          MDM  Number of Diagnoses or Management Options  Acute UTI: new and requires workup  Constipation, unspecified constipation type: new and requires workup  Nausea without vomiting: new and requires workup  Diagnosis management comments: 77-year-old female with history significant for dementia brought in by EMS for chief complaint of nausea. Patient has had no vomiting or abdominal pain per the staff at [address] [place] [address]. She does not appear acutely ill on exam.  Her abdomen is soft and nontender. She is afebrile. Noted creatinine of 1.32 with GFR of 49 today. Patient was given 500 cc bolus of IV fluids in the emergency department. She is drinking water during exam without difficulty. I do feel that she is able to adequately hydrate orally at the facility. Will cover for UTI as she did have  WBC on her micro and reports some pain with urination today during exam but unsure of how reliable patient's report is due to dementia. Prescriptions sent with EMS along with discharge instructions. Jamie Osorio NP; 11/1/2021 @9:32 PM Voice dictation software was used during the making of  this note. This software is not perfect and grammatical and other typographical errors  may be present.  This note has not been proofread for errors. Amount and/or Complexity of Data Reviewed  Clinical lab tests: reviewed  Tests in the radiology section of CPT®: reviewed      ED Course as of Nov 01 2124   Mon Nov 01, 2021   1850 FINDINGS: A small right pleural effusion is present. There is no  subdiaphragmatic free intraperitoneal air. Enteric contrast is present  throughout the colon. Cholecystectomy clips are present. There is a right hip  prosthesis.     IMPRESSION  Moderate amount stool present throughout the colon. XR ABD ACUTE W 1 V CHEST [BC]      ED Course User Index  [BC] Alexandr Knott NP     Recent Results (from the past 8 hour(s))   CBC WITH AUTOMATED DIFF    Collection Time: 11/01/21  3:52 PM   Result Value Ref Range    WBC 7.4 4.3 - 11.1 K/uL    RBC 3.86 (L) 4.05 - 5.2 M/uL    HGB 12.1 11.7 - 15.4 g/dL    HCT 35.7 (L) 35.8 - 46.3 %    MCV 92.5 79.6 - 97.8 FL    MCH 31.3 26.1 - 32.9 PG    MCHC 33.9 31.4 - 35.0 g/dL    RDW 13.2 11.9 - 14.6 %    PLATELET 065 779 - 111 K/uL    MPV 10.5 9.4 - 12.3 FL    ABSOLUTE NRBC 0.00 0.0 - 0.2 K/uL    DF AUTOMATED      NEUTROPHILS 64 43 - 78 %    LYMPHOCYTES 17 13 - 44 %    MONOCYTES 17 (H) 4.0 - 12.0 %    EOSINOPHILS 2 0.5 - 7.8 %    BASOPHILS 0 0.0 - 2.0 %    IMMATURE GRANULOCYTES 0 0.0 - 5.0 %    ABS. NEUTROPHILS 4.7 1.7 - 8.2 K/UL    ABS. LYMPHOCYTES 1.3 0.5 - 4.6 K/UL    ABS. MONOCYTES 1.2 0.1 - 1.3 K/UL    ABS. EOSINOPHILS 0.1 0.0 - 0.8 K/UL    ABS. BASOPHILS 0.0 0.0 - 0.2 K/UL    ABS. IMM.  GRANS. 0.0 0.0 - 0.5 K/UL   METABOLIC PANEL, COMPREHENSIVE    Collection Time: 11/01/21  3:52 PM   Result Value Ref Range    Sodium 138 136 - 145 mmol/L    Potassium 3.4 (L) 3.5 - 5.1 mmol/L    Chloride 105 98 - 107 mmol/L    CO2 27 21 - 32 mmol/L    Anion gap 6 (L) 7 - 16 mmol/L    Glucose 106 (H) 65 - 100 mg/dL    BUN 29 (H) 8 - 23 MG/DL    Creatinine 1.32 (H) 0.6 - 1.0 MG/DL    GFR est AA 49 (L) >60 ml/min/1.73m2    GFR est non-AA 41 (L) >60 ml/min/1.73m2    Calcium 9.1 8.3 - 10.4 MG/DL Bilirubin, total 0.9 0.2 - 1.1 MG/DL    ALT (SGPT) 12 12 - 65 U/L    AST (SGOT) 12 (L) 15 - 37 U/L    Alk.  phosphatase 85 50 - 136 U/L    Protein, total 6.9 6.3 - 8.2 g/dL    Albumin 3.0 (L) 3.2 - 4.6 g/dL    Globulin 3.9 (H) 2.3 - 3.5 g/dL    A-G Ratio 0.8 (L) 1.2 - 3.5     LIPASE    Collection Time: 11/01/21  3:52 PM   Result Value Ref Range    Lipase 73 73 - 393 U/L   LACTIC ACID    Collection Time: 11/01/21  3:52 PM   Result Value Ref Range    Lactic acid 1.1 0.4 - 2.0 MMOL/L   URINE MICROSCOPIC    Collection Time: 11/01/21  5:21 PM   Result Value Ref Range    WBC  0 /hpf    RBC 0-3 0 /hpf    Epithelial cells 0 0 /hpf    Bacteria TRACE 0 /hpf    Casts 10-20 0 /lpf       Procedures

## 2021-11-01 NOTE — DISCHARGE INSTRUCTIONS
Give medication as prescribed. Patient's imaging done in the emergency department today did show moderate stool throughout her colon, indicating that she is constipated. She is also being treated for a urinary tract infection. Return to the emergency department for any new, worsening, or concerning symptoms.

## 2021-11-02 NOTE — PROGRESS NOTES
Care Management Interventions  Transition of Care Consult (CM Consult): Discharge Planning, SNF  Discharge Location  Discharge Placement: Skilled nursing facility        Patient is from [facility]. Patient is able to return once medically cleared. Patient will need new precert if she is admitted or kept overnight. No further CM needs.

## 2021-11-02 NOTE — ED NOTES
Pt taken back to the nursing home via [facility] paper work was given to [place] to give to nursing home

## 2021-11-15 ENCOUNTER — HOSPITAL ENCOUNTER (OUTPATIENT)
Dept: CT IMAGING | Age: 81
Discharge: HOME | End: 2021-11-15
Attending: NURSE PRACTITIONER

## 2021-11-15 DIAGNOSIS — I61.1 HEMORRHAGE OF RIGHT TEMPORAL LOBE (HCC): ICD-10-CM

## 2021-11-16 ENCOUNTER — HOME HEALTH ADMISSION (OUTPATIENT)
Dept: HOME HEALTH SERVICES | Facility: HOME HEALTH | Age: 81
End: 2021-11-16

## 2022-10-20 NOTE — DISCHARGE SUMMARY
Hospitalist Discharge Summary   Admit Date:  10/3/2021  2:16 AM   DC Note date: 10/22/2021  Name:  Ramsey Jackson   Age:  78 y.o.   Sex:  female  :  1940   MRN:  [id number]   Room:  [facility]  PCP:  Tone Kaiser MD    Presenting Complaint: Headache    Initial Admission Diagnosis: Intracranial bleed ([facility] [I62.9]  Dementia ([facility] [F03.90]  Poorly-controlled hypertension [I10]  Hypothyroidism [E03.9]  Encephalopathy, unspecified [G93.40]     Problem List for this Hospitalization:  Hospital Problems as of 10/22/2021 Date Reviewed: 10/22/2021        Codes Class Noted - Resolved POA    * (Principal) RESOLVED: Hemorrhage of right temporal lobe ([facility] ICD-10-CM: I61.1  ICD-9-CM: 457  10/10/2021 - 10/22/2021 Yes        Chronic kidney disease, stage 3a ([facility] (Chronic) ICD-10-CM: N18.31  ICD-9-CM: 585.3  10/10/2021 - Present No        Hypothyroidism (Chronic) ICD-10-CM: E03.9  ICD-9-CM: 244.9  2012 - Present Yes        Dementia with behavioral disturbance ([facility] (Chronic) ICD-10-CM: F03.91  ICD-9-CM: 294.21  10/3/2021 - Present Yes        Primary hypertension (Chronic) ICD-10-CM: I10  ICD-9-CM: 401.9  2012 - Present Yes        Chronic diastolic congestive heart failure (HCC) (Chronic) ICD-10-CM: I50.32  ICD-9-CM: 428.32, 428.0  10/11/2021 - Present Yes        PAF (paroxysmal atrial fibrillation) (HCC) (Chronic) ICD-10-CM: I48.0  ICD-9-CM: 427.31  10/11/2021 - Present Yes        RESOLVED: Acute pulmonary edema with congestive heart failure ([facility] ICD-10-CM: I50.1  ICD-9-CM: 428.1  10/11/2021 - 10/11/2021 Clinically Undetermined        RESOLVED: Pleural effusion, bilateral ICD-10-CM: J90  ICD-9-CM: 511.9  10/11/2021 - 10/22/2021 Yes        RESOLVED: Acute metabolic encephalopathy DII-96-QE: G93.41  ICD-9-CM: 348.31  10/10/2021 - 10/22/2021 Yes        RESOLVED: Atrial fibrillation with RVR ([facility] ICD-10-CM: I48.91  ICD-9-CM: 427.31  10/10/2021 - 10/11/2021 Clinically Undetermined        RESOLVED: Poorly-controlled hypertension ICD-10-CM: I10  ICD-9-CM: 401.9  10/3/2021 - 10/22/2021 Yes        RESOLVED: UTI (urinary tract infection) ICD-10-CM: N39.0  ICD-9-CM: 599.0  7/2/2012 - 10/11/2021 Yes            Did Patient have Sepsis (YES OR NO): No    Hospital Course:  81F PMHx hypertension, dementia, hypothyroid admitted to the ICU 10/3 for altered mental status found to be an acute hemorrhage of the right temporal lobe. She was evaluated by neurosurgery and was found to not be a surgical candidate. On 10/3 a code was initiated for increased intracranial pressure and mannitol and cardene were started. She developed delirium and agitation. depakote was started. She remained altered for several days but slowly became more active. Neurology was consulted. On 10/11 gastroneurology was consulted for NG tube placement. Nutrition was consulted for tube feed. She was evaluated by physical therapy, Occupational Therapy and speech language pathology. Her condition slowly improved and she was able to have NG tube removed 10/19. She continued to have intermittent episodes of confusion typically secondary to another issue but overall maintained baseline of clear mentation. On 10/22 she was determined to be safe for discharge to a short-term rehab facility. She should follow-up with her primary care provider within 1 week. Primary care provider may consider the following:  -Plan follow-up with specialists  -Medication changes as noted below  -Progression of kidney disease with possible referral to nephrology for CKD 3a  -Renal dosing of medications    She should follow-up with cardiology within 2 weeks. She should follow-up with neurology within 2 weeks. Disposition: Rehab Facility  Diet: ADULT DIET Dysphagia - Pureed; Mildly Thick (Nectar); single straw sip; 1:1 assistance  ADULT ORAL NUTRITION SUPPLEMENT Lunch, Dinner; Frozen Supplement  Code Status: Full Code    Follow Up Orders:   Follow-up Appointments Procedures    FOLLOW UP VISIT Appointment in: One Week Follow up with PCP in 1 week     Follow up with PCP in 1 week     Standing Status:   Standing     Number of Occurrences:   1     Order Specific Question:   Appointment in     Answer: One Week    FOLLOW UP VISIT Appointment in: Two Weeks Cardiology within 2 weeks     Cardiology within 2 weeks     Standing Status:   Standing     Number of Occurrences:   1     Standing Expiration Date:   10/23/2021     Order Specific Question:   Appointment in     Answer: Two Weeks    FOLLOW UP VISIT Appointment in: Two Weeks Neurology within 2 weeks     Neurology within 2 weeks     Standing Status:   Standing     Number of Occurrences:   1     Standing Expiration Date:   10/23/2021     Order Specific Question:   Appointment in     Answer: Two Weeks       Follow-up Information     Follow up With Specialties Details Why Contact Info    [address] [facility] [address] [address]    Theodora Luna MD Family Medicine   [address] [phone]              Time spent in patient discharge and coordination 44 minutes. Plan was discussed with patient, , nurse, . All questions answered. Patient was stable at time of discharge. Instructions given to call a physician or return if any concerns. Discharge Info:   Current Discharge Medication List      START taking these medications    Details   levETIRAcetam (KEPPRA) 100 mg/ml soln oral solution Take 5 mL by mouth every twelve (12) hours every twelve (12) hours. Qty: 300 mL, Refills: 0  Start date: 10/22/2021         CONTINUE these medications which have CHANGED    Details   amLODIPine (NORVASC) 10 mg tablet Take 1 Tablet by mouth daily.   Qty: 30 Tablet, Refills: 0  Start date: 10/23/2021         CONTINUE these medications which have NOT CHANGED    Details levothyroxine (SYNTHROID) 150 mcg tablet Take 150 mcg by mouth Daily (before breakfast). escitalopram oxalate (LEXAPRO) 10 mg tablet Take 10 mg by mouth daily. meclizine (ANTIVERT) 25 mg tablet Take 25 mg by mouth three (3) times daily as needed for Dizziness or Nausea. calcium-vitamin D (OYSTER SHELL) 500 mg(1,250mg) -200 unit per tablet Take 1 Tab by mouth three (3) times daily (with meals). Qty: 90 Tab, Refills: 0         STOP taking these medications       traMADol (ULTRAM) 50 mg tablet Comments:   Reason for Stopping:               Procedures done this admission:  Procedure(s):  ESOPHAGOGASTRODUODENOSCOPY (EGD) with NG tube placement Room 715    Consults this admission:  IP CONSULT TO NEUROSURGERY  IP CONSULT TO GASTROENTEROLOGY    Echocardiogram/EKG results:  Results from Hospital Encounter encounter on 10/03/21    ECHO ADULT COMPLETE    Interpretation Summary  · TV: Mild to moderate tricuspid valve regurgitation is present. Right Ventricular Arterial Pressure (RVSP) is 58 mmHg. Pulmonary hypertension found to be moderate. · IVC: Moderately elevated central venous pressure (8 mmHg); IVC diameter is larger than 21 mm and collapses more than 50% with respiration. · LV: Estimated LVEF is 60 - 65%. Normal cavity size and systolic function (ejection fraction normal). Mild concentric hypertrophy. Left ventricular diastolic dysfunction. · LA: Mildly dilated left atrium. Left Atrium volume index is 36 mL/m2. · MV: Mitral valve thickening. Mitral valve leaflet calcification without reduced excursion. Mild mitral annular calcification. Mild mitral valve regurgitation is present. MR may be more severe, eccentric, difficult to visualize with patient agitation and respiratory interference, clinical correlation recommended. · Pericardium: Trivial anterior posterior pericardial effusion.        EKG Results     Procedure 720 Value Units Date/Time    EKG, 12 LEAD, INITIAL [[id number]] Collected: 10/09/21 2319    Order Status: Completed Updated: 10/10/21 0852     Ventricular Rate 135 BPM      Atrial Rate 131 BPM      QRS Duration 102 ms      Q-T Interval 328 ms      QTC Calculation (Bezet) 492 ms      Calculated R Axis -38 degrees      Calculated T Axis 52 degrees      Diagnosis --     Atrial fibrillation with rapid ventricular response  Left axis deviation  Nonspecific ST abnormality  Abnormal ECG  When compared with ECG of 29-JUN-2012 11:33,  Atrial fibrillation has replaced Sinus rhythm  Vent. rate has increased BY  73 BPM  Confirmed by Smiley Boykin ([id number]) on 10/10/2021 8:51:57 AM            Diagnostic Imaging/Tests:   MRI BRAIN W WO CONT    Result Date: 10/3/2021  BRAIN MRI BEFORE AND AFTER CONTRAST: CLINICAL HISTORY:  Follow-up hemorrhage. TECHNIQUE:  Sagittal T1 weighted, coronal FLAIR, and axial T1 and T2-weighted spin echo, FLAIR, gradient echo, and diffusion-weighted images were obtained. Axial and coronal T1-weighted images were then performed after injection of 14 cc of ProHance IV. COMPARISON:  HEAD CT and CTA earlier today. FINDINGS: Examination is limited by artifact associated with patient motion, despite repeat scanning. Subacute hematoma in the right temporal lobe with a hematocrit level now measures approximately 3.3 x 2.2 x 2.0 cm compared with 2.3 x 2.0 x 1.7 cm today. Extensive T2 and FLAIR hyperintensities elsewhere in the white matter nonspecific but most consistent with small vessel ischemic disease. The ventricles are normal in size and configuration accounting for the patient's age. Orbits and paranasal sinuses as well as mastoid air cells are clear as imaged. No abnormal focus of enhancement is seen after administration of contrast.     1.  MODEST INTERVAL INCREASE IN SIZE OF SUBCUTANEOUS RIGHT TEMPORAL LOBE NOW WITH MAXIMAL DIAMETER OF 3.3 CM BUT ONLY LOCALIZED MASS EFFECT. NO EVIDENCE OF UNDERLYING NEOPLASM OR VASCULAR MALFORMATION.  2.  CHRONIC SMALL VESSEL ISCHEMIC DISEASE. CT HEAD WO CONT    Result Date: 10/4/2021  EXAMINATION: HEAD CT WITHOUT CONTRAST 10/4/2021 1:27 PM ACCESSION NUMBER: [id number] INDICATION: IPH COMPARISON: CT head, 10/3/2021 TECHNIQUE: Multiple-row detector helical CT examination of the head without intravenous contrast. Radiation dose reduction techniques were used for this study:  Our CT scanners use one or all of the following: Automated exposure control, adjustment of the mA and/or kVp according to patient's size, iterative reconstruction. FINDINGS: Redemonstration of a 2.9 cm hyperdense image parenchymal hematoma in the right temporal lobe which is unchanged in size but with slight increase in size of surrounding vasogenic edema. The degree of localized mass effect does not appear to have significantly progressed. No new intracranial hemorrhage. No midline shift. Stable size and configuration of the ventricular system with mild ex vacuo ventriculomegaly from underlying generalized volume loss. There are bilateral periventricular and deep white matter hypodensities are unchanged most compatible with chronic microvascular ischemic changes. No definite large territory ischemic infarct. Orbits and globes are within normal limits. No extracranial soft tissue abnormality. Paranasal sinuses, mastoid air cells, and middle ears are well aerated. No acute or aggressive osseous abnormality. Grossly stable size of the right temporal lobe intraparenchymal hemorrhage with slight progression in surrounding vasogenic edema. The degree of localized mass effect is grossly unchanged. No midline shift or hydrocephalus. CT HEAD WO CONT    Result Date: 10/3/2021  Noncontrast CT of the brain.  COMPARISON: none INDICATION: Headache TECHNIQUE: Contiguous axial images were obtained from the skull base through the vertex without IV contrast. Radiation dose reduction techniques were used for this study:  Our CT scanners use one or all of the following: Automated exposure control, adjustment of the mA and/or kVp according to patient's size, iterative reconstruction. FINDINGS: There is acute intraparenchymal hematoma in the right temporal lobe, measuring up to 2.0 x 2.3 x 1.7 cm. Associated surrounding edema. There is no midline shift or hydrocephalus. Cerebellum and brainstem are grossly unremarkable. Periventricular hypodensities, nonspecific and likely due to chronic small vessel changes. Included globes appear intact. Paranasal sinuses and the mastoid air cells are aerated. There is no skull fracture. 1. An approximately 2.3 cm intraparenchymal hematoma in the right temporal lobe, of uncertain etiology but may be related to hypertension. Consider obtaining CT angiogram of the head to exclude possibility of underlying aneurysm. Other etiologies such as underlying mass and amyloid angiopathy could be considered. 2. No midline shift or hydrocephalus. [facility] Findings discussed with ED physician, Dr. Taz Keith, at 3:05 AM on exam date. CTA HEAD NECK W WO CONT    Result Date: 10/3/2021  HISTORY: 78years of age Female with right temporal intraparenchymal hemorrhage. Tay Price EXAM: CTA HEAD NECK W WO CONT. Radiation reduction dose techniques were used for the study. Our CT scanner use one or all of the following- Automated exposure control, adjustment of the mA and/or KV according the patient size, iterative reconstruction. 3-D multiplanar reformations were performed at the workstation. All carotid artery stenosis percentages are based upon NASCET criteria if appropriate. Per the CT technologist, the study was analyzed by the [address] ai algorithm. The radiologists have been asked by the hospital administration to designate when CTA studies are sent to [address] ai even though this algorithm is not used by the radiologist for exam interpretation. COMPARISON: No prior vascular imaging of the head or neck available. Noncontrast CT head and CT perfusion exams on 10/3/2021. MR brain exam on 10/3/2021. CT head examination demonstrated acute intraparenchymal hemorrhage within the lateral right temporal lobe. FINDINGS: VASCULAR FINDINGS: Cervical CTA: Visualized Aorta: There is a three-vessel branching pattern of the aortic arch. Trace atherosclerotic disease of the aortic arch. Right Subclavian Artery: Patent. Left Subclavian Artery: Patent. Right Carotid Arteries: Common Carotid Artery: Patent without significant stenosis. External Carotid Artery: Grossly patent. Carotid Bulb and Proximal ICA: Trace atherosclerotic disease of the right carotid bulb without stenosis. Cervical Right ICA: Patent. Vertebral Artery Dominance: Left vertebral artery is dominant. Cervical Right Vertebral Artery: Patent. Left Carotid Arteries: Common Carotid Artery: Patent without significant stenosis. External Carotid Artery: Grossly patent. Carotid Bulb and Proximal ICA: Trace atherosclerotic disease of the left carotid bulb without stenosis. Cervical Left ICA: Patent. Cervical Left Vertebral Artery: Patent. Cranial CTA: Right Internal Carotid Artery: Patent. There is a 3 mm eccentric blisterlike wide neck aneurysm along the lateral aspect of the cavernous right ICA. Left Internal Carotid Artery: Patent. Intracranial ICA Atherosclerotic Disease: No significant atherosclerotic disease of the intracranial ICAs. Anterior Cerebral Arteries: Right A1 Segment: Patent. Left A1: Patent. Bilateral A2 segments are patent. Bilateral A3 segments appear grossly patent. Right Middle Cerebral Arteries: Right M1 segment is patent. Major proximal MCA branches beyond the bifurcation are patent. Left Middle Cerebral Arteries: Left M1 segment is patent. Major proximal MCA branches beyond the bifurcation are patent. Right Vertebral Artery: Severely diminutive beyond PICA origin but patent. Left Vertebral Artery: Patent. Basilar Artery: Patent. Right Posterior Cerebral Artery: Patent. Left Posterior Cerebral Artery: Patent.  Intracranial Aneurysms: As above.. Intracranial Proximal Vessel Occlusion: None. Major Dural Venous Sinuses: Not adequately evaluated. No evidence of vascular malformations. NONVASCULAR FINDINGS: Head: Brain Parenchyma: Limited evaluation of the known acute to subacute lateral right temporal lobe infarct with some surrounding vasogenic edema. No midline shift. Chronic ischemic white matter demyelination changes. Orbital Structures: Bilateral lens replacements. Calvarial/Maxillofacial Soft Tissues: No evidence of significant acute abnormality in the calvarial or maxillofacial soft tissues. Neck: Thyroid gland:Severely hypoplastic. Cervical and Upper Mediastinal Adenopathy: No significant adenopathy. Upper Lungs: Without areas of prominent focal consolidation or masses. OSSEOUS STRUCTURES: Mastoid Air Cells: Unremarkable. Paranasal Sinuses: No evidence of significant mucoperiosteal thickening. Cervical Spine: Multilevel degenerative changes of the cervical spine with some degenerative changes in the visualized upper thoracic spine. 1. 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 This aneurysm is however not in the region of the known right temporal lobe intraparenchymal hemorrhage and is therefore unrelated to the hemorrhage and likely chronic. No evidence of other intracranial aneurysms or vascular malformations. No proximal vessel occlusion or significant stenosis within the major intracranial arterial vasculature. 2. Negative CTA neck exam for significant stenosis or occlusion in the major cervical arterial vasculature. CT PERF W CBF    Result Date: 10/3/2021  CT Perfusion Imaging INDICATION:  Right temporal lobe hematoma. Headaches. Right-sided pain. CT perfusion imaging of the brain was performed after the administration of intravenous contrast.  Perfusion maps and perfusion analysis output were generated using the vis ai perfusion processing software algorithm.    Radiation dose reduction techniques were used for this study: All CT scans performed at this facility use one or all of the following: Automated exposure control, adjustment of the mA and/or kVp according to patient's size, iterative reconstruction. Findings: CBF < 30% volume:  5 ml   (core infarction volume greater than 50 cc associated with poor outcomes) Tmax > 6 seconds: 5 ml Tmax/CBF Mismatch Volume: 0 ml Tmax/CBF Mismatch Ratio: 1 Hypoperfusion Intensity Ratio: 0   (values greater than 0.5 associated with poor outcome) Tmax > 10 seconds Volume: 0 ml   (volume greater than 100 mL is associated with poor outcome)     Small area of matched defect in the right temporal lobe, corresponding to a focal hematoma noted on prior CT study. No mismatch defect. No evidence of significant core infarct or significant ischemic penumbra. Please note that the determination of patient treatment is not based solely upon imaging factors or calculation values. Management of ischemia is at the discretion of the primary physician and is based upon a combination of clinical and imaging data, along other factors. CT CODE NEURO HEAD WO CONTRAST    Result Date: 10/3/2021  Clinical history: Confusion. Change in mental status. TECHNIQUE: Axial coronal CT of the brain without IV contrast.. CT dose reduction was achieved through use of a standardized protocol tailored for this examination and automatic exposure control for dose modulation. COMPARISON: Earlier today. FINDINGS: There is acute intraparenchymal hematoma in the right temporal lobe, measuring up to 3 cm. There is associated edema. There is no midline shift or hydrocephalus. The cerebellum and brainstem are grossly unremarkable. Periventricular hypodensities, nonspecific and likely due to chronic small vessel changes. Included globes appear intact. The paranasal sinuses and the mastoid air cells are aerated. Surrounding bones are intact.      1. Intraparenchymal hematoma in the right temporal lobe, without substantial change when compared to MRI from earlier today. No midline shift or hydrocephalus. All Micro Results     Procedure Component Value Units Date/Time    COVID-19 RAPID TEST [[id number]] Collected: 10/22/21 1408    Order Status: Completed Updated: 10/22/21 1415    COVID-19 RAPID TEST [[id number]] Collected: 10/12/21 0926    Order Status: Completed Specimen: Nasopharyngeal Updated: 10/12/21 1014     Specimen source NASAL        COVID-19 rapid test Not detected        Comment:      The specimen is NEGATIVE for SARS-CoV-2, the novel coronavirus associated with COVID-19. A negative result does not rule out COVID-19. This test has been authorized by the FDA under an Emergency Use Authorization (EUA) for use by authorized laboratories. Fact sheet for Healthcare Providers: [employer] Waterdate.co.nz  Fact sheet for Patients: epacube.co.nz       Methodology: Isothermal Nucleic Acid Amplification         CULTURE, URINE [[id number]]  (Abnormal)  (Susceptibility) Collected: 10/04/21 2107    Order Status: Completed Specimen: Cath Urine Updated: 10/07/21 0749     Special Requests: NO SPECIAL REQUESTS        Culture result:       >100,000 COLONIES/mL KLEBSIELLA PNEUMONIAE                Labs: Results:       BMP, Mg, Phos Recent Labs     10/22/21  0422 10/21/21  0318 10/20/21  0307    139 139   K 3.9 4.0 4.5    107 106   CO2 28 26 31   AGAP 4* 6* 2*   BUN 22 29* 30*   CREA 1.10* 1.27* 1.38*   CA 8.5 8.8 8.6   GLU 79 76 78   MG 1.9  --  2.1   PHOS 2.8  --  3.0      CBC No results for input(s): WBC, RBC, HGB, HCT, PLT, GRANS, LYMPH, EOS, MONOS, BASOS, IG, ANEU, ABL, DAVID, ABM, ABB, AIG, HGBEXT, HCTEXT, PLTEXT in the last 72 hours. LFT No results for input(s): ALT, TBIL, AP, TP, ALB, GLOB, AGRAT in the last 72 hours.     No lab exists for component: SGOT, GPT   Cardiac Testing No results found for: BNPP, BNP, CPK, RCK1, RCK2, RCK3, RCK4, CKMB, CKNDX, CKND1, TROPT, TROIQ Coagulation Tests Lab Results   Component Value Date/Time    Prothrombin time 13.3 10/03/2021 03:28 AM    Prothrombin time 10.1 06/29/2012 11:45 AM    INR 1.0 10/03/2021 03:28 AM    INR 1.0 06/29/2012 11:45 AM    aPTT 28.9 10/03/2021 03:28 AM    aPTT 23.8 06/29/2012 11:45 AM      A1c No results found for: HBA1C, [id number]   Lipid Panel No results found for: CHOL, CHOLPOCT, CHOLX, CHLST, CHOLV, 407467, HDL, HDLP, LDL, LDLC, DLDLP, [id number], VLDLC, VLDL, TGLX, TRIGL, TRIGP, TGLPOCT, CHHD, [facility]   Thyroid Panel Lab Results   Component Value Date/Time    TSH 0.058 (L) 10/09/2021 09:35 PM        Most Recent UA Lab Results   Component Value Date/Time    Color YELLOW 10/04/2021 03:11 PM    Appearance TURBID 10/04/2021 03:11 PM    pH (UA) 5.5 10/04/2021 03:11 PM    Protein 100 (A) 10/04/2021 03:11 PM    Glucose Negative 10/04/2021 03:11 PM    Ketone 15 (A) 10/04/2021 03:11 PM    Bilirubin Negative 10/04/2021 03:11 PM    Blood MODERATE (A) 10/04/2021 03:11 PM    Urobilinogen 0.2 10/04/2021 03:11 PM    Nitrites Negative 10/04/2021 03:11 PM    Leukocyte Esterase Negative 10/04/2021 03:11 PM    WBC 10-20 10/04/2021 03:11 PM    RBC 10-20 10/04/2021 03:11 PM    Epithelial cells 0-3 10/04/2021 03:11 PM    Bacteria 4+ (H) 10/04/2021 03:11 PM    Casts 0-3 10/04/2021 03:11 PM    Crystals, urine 0 06/29/2012 02:20 PM    Mucus 0 06/29/2012 02:20 PM          All Labs from Last 24 Hrs:  Recent Results (from the past 24 hour(s))   METABOLIC PANEL, BASIC    Collection Time: 10/22/21  4:22 AM   Result Value Ref Range    Sodium 139 136 - 145 mmol/L    Potassium 3.9 3.5 - 5.1 mmol/L    Chloride 107 98 - 107 mmol/L    CO2 28 21 - 32 mmol/L    Anion gap 4 (L) 7 - 16 mmol/L    Glucose 79 65 - 100 mg/dL    BUN 22 8 - 23 MG/DL    Creatinine 1.10 (H) 0.6 - 1.0 MG/DL    GFR est AA >60 >60 ml/min/1.73m2    GFR est non-AA 50 (L) >60 ml/min/1.73m2    Calcium 8.5 8.3 - 10.4 MG/DL   MAGNESIUM    Collection Time: 10/22/21  4:22 AM   Result Value Ref Range Magnesium 1.9 1.8 - 2.4 mg/dL   PHOSPHORUS    Collection Time: 10/22/21  4:22 AM   Result Value Ref Range    Phosphorus 2.8 2.3 - 3.7 MG/DL       Current Med List in Hospital:   Current Facility-Administered Medications   Medication Dose Route Frequency    polyethylene glycol (MIRALAX) packet 17 g  17 g Oral DAILY PRN    levETIRAcetam (KEPPRA) oral solution 500 mg  500 mg Oral Q12H    lactated Ringers infusion  125 mL/hr IntraVENous CONTINUOUS    [Held by provider] carvediloL (COREG) tablet 6.25 mg  6.25 mg Oral BID WITH MEALS    amLODIPine (NORVASC) tablet 10 mg  10 mg Oral DAILY    senna-docusate (PERICOLACE) 8.6-50 mg per tablet 2 Tablet  2 Tablet Oral DAILY    levothyroxine (SYNTHROID) tablet 150 mcg  150 mcg Oral ACB    escitalopram oxalate (LEXAPRO) 5 mg/5 mL oral solution soln 10 mg  10 mg Oral DAILY    meclizine (ANTIVERT) tablet 25 mg  25 mg Oral TID PRN    ondansetron (ZOFRAN) injection 8 mg  8 mg IntraVENous Q6H PRN    nystatin (MYCOSTATIN) 100,000 unit/gram powder   Topical BID    NUTRITIONAL SUPPORT ELECTROLYTE PRN ORDERS   Does Not Apply PRN    hydrALAZINE (APRESOLINE) 20 mg/mL injection 15 mg  15 mg IntraVENous Q4H PRN    central line flush (saline) syringe 10 mL  10 mL InterCATHeter Q8H    [Held by provider] furosemide (LASIX) injection 40 mg  40 mg IntraVENous BID    [Held by provider] QUEtiapine (SEROquel) tablet 12.5 mg  12.5 mg Oral QHS    acetaminophen (TYLENOL) tablet 650 mg  650 mg Oral Q4H PRN    [Held by provider] divalproex (DEPAKOTE SPRINKLE) capsule 125 mg  125 mg Oral Q12H    LORazepam (ATIVAN) injection 1 mg  1 mg IntraVENous Q4H PRN    sodium chloride (NS) flush 5-40 mL  5-40 mL IntraVENous PRN       Allergies   Allergen Reactions    Adhesive Tape-Silicones Rash    Tramadol Nausea and Vomiting    Trazodone Other (comments)     hallucinations    Zolpidem Other (comments)     hallucinations     Immunization History   Administered Date(s) Administered    COVID-19, PFIZER, MRNA, LNP-S, PF, 30MCG/0.3ML DOSE 02/24/2021, 03/17/2021    TB Skin Test (PPD) Intradermal 10/08/2021, 10/15/2021       Recent Vital Data:  Patient Vitals for the past 24 hrs:   Temp Pulse Resp BP SpO2   10/22/21 1200 97.7 °F (36.5 °C) (!) 57 16 132/70 98 %   10/22/21 0936  62      10/22/21 0800 97.4 °F (36.3 °C) (!) 52 16 129/68 98 %   10/22/21 0400 97.5 °F (36.4 °C) (!) 56 16 121/63 98 %   10/22/21 0000 98.2 °F (36.8 °C) (!) 55 16 129/60 97 %   10/21/21 2000 97.7 °F (36.5 °C) (!) 52 15 114/61 99 %     Oxygen Therapy  O2 Sat (%): 98 % (10/22/21 1200)  Pulse via Oximetry: 78 beats per minute (10/12/21 1352)  O2 Device: Hi flow nasal cannula (10/22/21 1003)  Skin Assessment: Other (see comment/note) (no breakdown; mucosa dry) (10/09/21 1900)  Skin Protection for O2 Device: N/A (10/03/21 2001)  O2 Flow Rate (L/min): 2 l/min (10/20/21 1529)  O2 Temperature: 35.6 °F (2 °C) (10/09/21 0705)    Estimated body mass index is 27.66 kg/m² as calculated from the following:    Height as of this encounter: 5' 5\" (1.651 m). Weight as of this encounter: 75.4 kg (166 lb 3.6 oz). Intake/Output Summary (Last 24 hours) at 10/22/2021 1428  Last data filed at 10/22/2021 0425  Gross per 24 hour   Intake    Output 250 ml   Net -250 ml       Physical Exam  Vitals and nursing note reviewed. Constitutional:       General: She is not in acute distress. Appearance: Normal appearance. Comments: pleasant   HENT:      Head: Normocephalic. Eyes:      Extraocular Movements: Extraocular movements intact. Cardiovascular:      Rate and Rhythm: Normal rate. Pulses:           Radial pulses are 2+ on the left side. Pulmonary:      Effort: Pulmonary effort is normal. No respiratory distress. Musculoskeletal:         General: No deformity. Cervical back: No rigidity. Right lower leg: No edema. Left lower leg: No edema. Skin:     General: Skin is warm and dry.    Neurological: General: No focal deficit present. Mental Status: She is alert and oriented to person, place, and time. Cranial Nerves: No cranial nerve deficit. Sensory: No sensory deficit.       Comments: No confusion   Psychiatric:         Mood and Affect: Mood normal.         Behavior: Behavior normal.         Signed:  Talon Bills MD Sebastián Fernandez

## (undated) DEVICE — KENDALL RADIOLUCENT FOAM MONITORING ELECTRODE RECTANGULAR SHAPE: Brand: KENDALL

## (undated) DEVICE — CLIP NSL 12-14FR DK BLU FOR NSL TB RET SYS AMT BRIDLE PRO

## (undated) DEVICE — CANNULA NSL ORAL AD FOR CAPNOFLEX CO2 O2 AIRLFE

## (undated) DEVICE — BLOCK BITE AD 60FR W/ VELC STRP ADDRESSES MOST PT AND

## (undated) DEVICE — CONNECTOR TBNG OD5-7MM O2 END DISP

## (undated) DEVICE — ENTRIFLEX 8FR X 36 INCH WITH STYLET AND DUAL PORT ADAPTER: Brand: KANGAROO

## (undated) DEVICE — MEDI-VAC YANKAUER SUCTION HANDLE W/BULBOUS TIP: Brand: CARDINAL HEALTH